# Patient Record
Sex: MALE | Race: WHITE | Employment: OTHER | URBAN - METROPOLITAN AREA
[De-identification: names, ages, dates, MRNs, and addresses within clinical notes are randomized per-mention and may not be internally consistent; named-entity substitution may affect disease eponyms.]

---

## 2020-07-30 ENCOUNTER — APPOINTMENT (OUTPATIENT)
Dept: GENERAL RADIOLOGY | Age: 50
DRG: 698 | End: 2020-07-30
Payer: MEDICARE

## 2020-07-30 ENCOUNTER — HOSPITAL ENCOUNTER (INPATIENT)
Age: 50
LOS: 4 days | Discharge: NURSING, REHAB OR LONG-TERM CARE | DRG: 698 | End: 2020-08-03
Attending: EMERGENCY MEDICINE | Admitting: INTERNAL MEDICINE
Payer: MEDICARE

## 2020-07-30 ENCOUNTER — APPOINTMENT (OUTPATIENT)
Dept: CT IMAGING | Age: 50
DRG: 698 | End: 2020-07-30
Payer: MEDICARE

## 2020-07-30 PROBLEM — A41.9 SEPSIS (HCC): Status: ACTIVE | Noted: 2020-07-30

## 2020-07-30 LAB
ALBUMIN SERPL-MCNC: 3.8 G/DL (ref 3.5–4.6)
ALP BLD-CCNC: 59 U/L (ref 35–104)
ALT SERPL-CCNC: 8 U/L (ref 0–41)
ANION GAP SERPL CALCULATED.3IONS-SCNC: 14 MEQ/L (ref 9–15)
AST SERPL-CCNC: 17 U/L (ref 0–40)
BACTERIA: ABNORMAL /HPF
BASOPHILS ABSOLUTE: 0 K/UL (ref 0–0.2)
BASOPHILS RELATIVE PERCENT: 0.4 %
BILIRUB SERPL-MCNC: 0.5 MG/DL (ref 0.2–0.7)
BILIRUBIN URINE: NEGATIVE
BLOOD, URINE: ABNORMAL
BUN BLDV-MCNC: 12 MG/DL (ref 6–20)
CALCIUM SERPL-MCNC: 9.3 MG/DL (ref 8.5–9.9)
CHLORIDE BLD-SCNC: 102 MEQ/L (ref 95–107)
CLARITY: ABNORMAL
CO2: 26 MEQ/L (ref 20–31)
COARSE CASTS, UA: ABNORMAL /LPF (ref 0–5)
COLOR: ABNORMAL
CREAT SERPL-MCNC: 0.87 MG/DL (ref 0.7–1.2)
CRYSTALS, UA: ABNORMAL /HPF
EOSINOPHILS ABSOLUTE: 0 K/UL (ref 0–0.7)
EOSINOPHILS RELATIVE PERCENT: 0.1 %
EPITHELIAL CELLS, UA: ABNORMAL /HPF (ref 0–5)
GFR AFRICAN AMERICAN: >60
GFR NON-AFRICAN AMERICAN: >60
GLOBULIN: 3.4 G/DL (ref 2.3–3.5)
GLUCOSE BLD-MCNC: 138 MG/DL (ref 70–99)
GLUCOSE URINE: NEGATIVE MG/DL
HCT VFR BLD CALC: 37.7 % (ref 42–52)
HEMOGLOBIN: 12.2 G/DL (ref 14–18)
KETONES, URINE: ABNORMAL MG/DL
LACTIC ACID, SEPSIS: 1.3 MMOL/L (ref 0.5–1.9)
LACTIC ACID: 1.6 MMOL/L (ref 0.5–2.2)
LEUKOCYTE ESTERASE, URINE: ABNORMAL
LIPASE: 25 U/L (ref 12–95)
LYMPHOCYTES ABSOLUTE: 0.6 K/UL (ref 1–4.8)
LYMPHOCYTES RELATIVE PERCENT: 5.2 %
MCH RBC QN AUTO: 27.9 PG (ref 27–31.3)
MCHC RBC AUTO-ENTMCNC: 32.4 % (ref 33–37)
MCV RBC AUTO: 86 FL (ref 80–100)
MONOCYTES ABSOLUTE: 0.8 K/UL (ref 0.2–0.8)
MONOCYTES RELATIVE PERCENT: 6.7 %
NEUTROPHILS ABSOLUTE: 10.7 K/UL (ref 1.4–6.5)
NEUTROPHILS RELATIVE PERCENT: 87.6 %
NITRITE, URINE: POSITIVE
PDW BLD-RTO: 18.2 % (ref 11.5–14.5)
PH UA: 6 (ref 5–9)
PLATELET # BLD: 249 K/UL (ref 130–400)
POC CREATININE WHOLE BLOOD: 0.9
POTASSIUM SERPL-SCNC: 3 MEQ/L (ref 3.4–4.9)
PROCALCITONIN: 1.68 NG/ML (ref 0–0.15)
PROTEIN UA: >=300 MG/DL
RBC # BLD: 4.38 M/UL (ref 4.7–6.1)
RBC UA: >100 /HPF (ref 0–5)
SARS-COV-2, NAAT: NOT DETECTED
SODIUM BLD-SCNC: 142 MEQ/L (ref 135–144)
SPECIFIC GRAVITY UA: 1.05 (ref 1–1.03)
TOTAL PROTEIN: 7.2 G/DL (ref 6.3–8)
URINE REFLEX TO CULTURE: YES
UROBILINOGEN, URINE: 1 E.U./DL
WBC # BLD: 12.2 K/UL (ref 4.8–10.8)
WBC UA: >100 /HPF (ref 0–5)

## 2020-07-30 PROCEDURE — 84145 PROCALCITONIN (PCT): CPT

## 2020-07-30 PROCEDURE — 83690 ASSAY OF LIPASE: CPT

## 2020-07-30 PROCEDURE — 87186 SC STD MICRODIL/AGAR DIL: CPT

## 2020-07-30 PROCEDURE — U0002 COVID-19 LAB TEST NON-CDC: HCPCS

## 2020-07-30 PROCEDURE — 87149 DNA/RNA DIRECT PROBE: CPT

## 2020-07-30 PROCEDURE — 87077 CULTURE AEROBIC IDENTIFY: CPT

## 2020-07-30 PROCEDURE — 6360000004 HC RX CONTRAST MEDICATION: Performed by: PHYSICIAN ASSISTANT

## 2020-07-30 PROCEDURE — 6360000002 HC RX W HCPCS: Performed by: PHYSICIAN ASSISTANT

## 2020-07-30 PROCEDURE — 96367 TX/PROPH/DG ADDL SEQ IV INF: CPT

## 2020-07-30 PROCEDURE — 87040 BLOOD CULTURE FOR BACTERIA: CPT

## 2020-07-30 PROCEDURE — 2580000003 HC RX 258: Performed by: PHYSICIAN ASSISTANT

## 2020-07-30 PROCEDURE — 81003 URINALYSIS AUTO W/O SCOPE: CPT

## 2020-07-30 PROCEDURE — 87086 URINE CULTURE/COLONY COUNT: CPT

## 2020-07-30 PROCEDURE — 71045 X-RAY EXAM CHEST 1 VIEW: CPT

## 2020-07-30 PROCEDURE — 96365 THER/PROPH/DIAG IV INF INIT: CPT

## 2020-07-30 PROCEDURE — 96366 THER/PROPH/DIAG IV INF ADDON: CPT

## 2020-07-30 PROCEDURE — 2060000000 HC ICU INTERMEDIATE R&B

## 2020-07-30 PROCEDURE — 36415 COLL VENOUS BLD VENIPUNCTURE: CPT

## 2020-07-30 PROCEDURE — 74177 CT ABD & PELVIS W/CONTRAST: CPT

## 2020-07-30 PROCEDURE — 6370000000 HC RX 637 (ALT 250 FOR IP): Performed by: PHYSICIAN ASSISTANT

## 2020-07-30 PROCEDURE — 83605 ASSAY OF LACTIC ACID: CPT

## 2020-07-30 PROCEDURE — 80053 COMPREHEN METABOLIC PANEL: CPT

## 2020-07-30 PROCEDURE — 99285 EMERGENCY DEPT VISIT HI MDM: CPT

## 2020-07-30 PROCEDURE — 85025 COMPLETE CBC W/AUTO DIFF WBC: CPT

## 2020-07-30 RX ORDER — POTASSIUM CHLORIDE 750 MG/1
40 TABLET, FILM COATED, EXTENDED RELEASE ORAL DAILY
Status: DISCONTINUED | OUTPATIENT
Start: 2020-07-31 | End: 2020-07-31

## 2020-07-30 RX ORDER — BACLOFEN 10 MG/1
10 TABLET ORAL EVERY MORNING
Status: ON HOLD | COMMUNITY

## 2020-07-30 RX ORDER — IBUPROFEN 800 MG/1
800 TABLET ORAL ONCE
Status: COMPLETED | OUTPATIENT
Start: 2020-07-30 | End: 2020-07-30

## 2020-07-30 RX ORDER — ACETAMINOPHEN 650 MG/1
650 SUPPOSITORY RECTAL EVERY 6 HOURS PRN
Status: DISCONTINUED | OUTPATIENT
Start: 2020-07-30 | End: 2020-08-04 | Stop reason: HOSPADM

## 2020-07-30 RX ORDER — ACETAMINOPHEN 500 MG
1000 TABLET ORAL ONCE
Status: COMPLETED | OUTPATIENT
Start: 2020-07-30 | End: 2020-07-30

## 2020-07-30 RX ORDER — SODIUM CHLORIDE 0.9 % (FLUSH) 0.9 %
10 SYRINGE (ML) INJECTION EVERY 12 HOURS SCHEDULED
Status: DISCONTINUED | OUTPATIENT
Start: 2020-07-30 | End: 2020-08-03 | Stop reason: SDUPTHER

## 2020-07-30 RX ORDER — LEVOTHYROXINE SODIUM 0.05 MG/1
50 TABLET ORAL DAILY
Status: ON HOLD | COMMUNITY

## 2020-07-30 RX ORDER — GABAPENTIN 100 MG/1
100 CAPSULE ORAL 3 TIMES DAILY
COMMUNITY
End: 2020-11-03 | Stop reason: SDUPTHER

## 2020-07-30 RX ORDER — PROPRANOLOL HCL 60 MG
60 CAPSULE, EXTENDED RELEASE 24HR ORAL DAILY
Status: DISCONTINUED | OUTPATIENT
Start: 2020-07-31 | End: 2020-08-04 | Stop reason: HOSPADM

## 2020-07-30 RX ORDER — GABAPENTIN 100 MG/1
100 CAPSULE ORAL 3 TIMES DAILY
Status: DISCONTINUED | OUTPATIENT
Start: 2020-07-30 | End: 2020-08-04 | Stop reason: HOSPADM

## 2020-07-30 RX ORDER — ACETAMINOPHEN 325 MG/1
650 TABLET ORAL EVERY 6 HOURS PRN
Status: DISCONTINUED | OUTPATIENT
Start: 2020-07-30 | End: 2020-08-04 | Stop reason: HOSPADM

## 2020-07-30 RX ORDER — DIVALPROEX SODIUM 500 MG/1
500 TABLET, EXTENDED RELEASE ORAL NIGHTLY
Status: DISCONTINUED | OUTPATIENT
Start: 2020-07-30 | End: 2020-08-02

## 2020-07-30 RX ORDER — BACLOFEN 10 MG/1
10 TABLET ORAL EVERY MORNING
Status: DISCONTINUED | OUTPATIENT
Start: 2020-07-31 | End: 2020-08-04 | Stop reason: HOSPADM

## 2020-07-30 RX ORDER — LAMOTRIGINE 200 MG/1
200 TABLET ORAL 2 TIMES DAILY
Status: DISCONTINUED | OUTPATIENT
Start: 2020-07-30 | End: 2020-08-04 | Stop reason: HOSPADM

## 2020-07-30 RX ORDER — LAMOTRIGINE 200 MG/1
200 TABLET ORAL 2 TIMES DAILY
Status: ON HOLD | COMMUNITY

## 2020-07-30 RX ORDER — POTASSIUM CHLORIDE 1500 MG/1
60 TABLET, FILM COATED, EXTENDED RELEASE ORAL 2 TIMES DAILY
COMMUNITY
End: 2020-10-28

## 2020-07-30 RX ORDER — 0.9 % SODIUM CHLORIDE 0.9 %
30 INTRAVENOUS SOLUTION INTRAVENOUS ONCE
Status: COMPLETED | OUTPATIENT
Start: 2020-07-30 | End: 2020-07-30

## 2020-07-30 RX ORDER — POLYETHYLENE GLYCOL 3350 17 G/17G
17 POWDER, FOR SOLUTION ORAL DAILY
Status: DISCONTINUED | OUTPATIENT
Start: 2020-07-30 | End: 2020-08-04 | Stop reason: HOSPADM

## 2020-07-30 RX ORDER — LEVOTHYROXINE SODIUM 0.05 MG/1
50 TABLET ORAL DAILY
Status: DISCONTINUED | OUTPATIENT
Start: 2020-07-31 | End: 2020-08-04 | Stop reason: HOSPADM

## 2020-07-30 RX ORDER — DIVALPROEX SODIUM 250 MG
750 TABLET, EXTENDED RELEASE 24 HR ORAL 2 TIMES DAILY
Status: ON HOLD | COMMUNITY

## 2020-07-30 RX ORDER — SODIUM CHLORIDE 0.9 % (FLUSH) 0.9 %
10 SYRINGE (ML) INJECTION PRN
Status: DISCONTINUED | OUTPATIENT
Start: 2020-07-30 | End: 2020-08-03 | Stop reason: SDUPTHER

## 2020-07-30 RX ORDER — PROPRANOLOL HCL 60 MG
60 CAPSULE, EXTENDED RELEASE 24HR ORAL DAILY
Status: ON HOLD | COMMUNITY
End: 2020-12-03 | Stop reason: HOSPADM

## 2020-07-30 RX ORDER — DIVALPROEX SODIUM 500 MG/1
500 TABLET, EXTENDED RELEASE ORAL NIGHTLY
Status: ON HOLD | COMMUNITY

## 2020-07-30 RX ORDER — POLYETHYLENE GLYCOL 3350 17 G/17G
17 POWDER, FOR SOLUTION ORAL DAILY PRN
COMMUNITY
End: 2020-11-26 | Stop reason: SDUPTHER

## 2020-07-30 RX ORDER — SODIUM PHOSPHATE, DIBASIC AND SODIUM PHOSPHATE, MONOBASIC 7; 19 G/133ML; G/133ML
1 ENEMA RECTAL DAILY PRN
COMMUNITY
End: 2020-11-26 | Stop reason: SDUPTHER

## 2020-07-30 RX ORDER — BISACODYL 10 MG
10 SUPPOSITORY, RECTAL RECTAL DAILY
Status: DISCONTINUED | OUTPATIENT
Start: 2020-07-30 | End: 2020-08-04 | Stop reason: HOSPADM

## 2020-07-30 RX ADMIN — POTASSIUM CHLORIDE AND SODIUM CHLORIDE: 900; 150 INJECTION, SOLUTION INTRAVENOUS at 16:00

## 2020-07-30 RX ADMIN — PIPERACILLIN AND TAZOBACTAM 3.38 G: 3; .375 INJECTION, POWDER, LYOPHILIZED, FOR SOLUTION INTRAVENOUS at 18:18

## 2020-07-30 RX ADMIN — ACETAMINOPHEN 1000 MG: 500 TABLET ORAL at 17:36

## 2020-07-30 RX ADMIN — IBUPROFEN 800 MG: 800 TABLET, FILM COATED ORAL at 17:37

## 2020-07-30 RX ADMIN — POTASSIUM BICARBONATE 40 MEQ: 782 TABLET, EFFERVESCENT ORAL at 15:54

## 2020-07-30 RX ADMIN — IOPAMIDOL 100 ML: 612 INJECTION, SOLUTION INTRAVENOUS at 16:26

## 2020-07-30 RX ADMIN — SODIUM CHLORIDE 3267 ML: 9 INJECTION, SOLUTION INTRAVENOUS at 17:40

## 2020-07-30 SDOH — HEALTH STABILITY: MENTAL HEALTH: HOW OFTEN DO YOU HAVE A DRINK CONTAINING ALCOHOL?: NEVER

## 2020-07-30 ASSESSMENT — PAIN SCALES - GENERAL: PAINLEVEL_OUTOF10: 0

## 2020-07-30 ASSESSMENT — ENCOUNTER SYMPTOMS
RECTAL PAIN: 0
NAUSEA: 0
VOMITING: 0
RHINORRHEA: 0
CONSTIPATION: 0
EYE DISCHARGE: 0
ABDOMINAL PAIN: 0
DIARRHEA: 0
SORE THROAT: 0
ABDOMINAL DISTENTION: 1
SHORTNESS OF BREATH: 0
COLOR CHANGE: 0

## 2020-07-30 NOTE — ED NOTES
Labs drawn. IV hung. Tolerated well. Pt cooperative. Took Motrin and Tylenol without difficulty.       Fe Duarte RN  07/30/20 2440

## 2020-07-30 NOTE — CARE COORDINATION
[facility] EMERGENCY [facility] AT [place] Case Management Initial Discharge Assessment    Met with Family to discuss discharge plan. PCP: Naima Solano MD                                Date of Last Visit: \"weeks\"    If no PCP, list provided? N/A    Discharge Planning    Living Arrangements: has his own apartment but has 24 hr caregivers from [address]      If lives at home:     Do you have any barriers navigating in your home? no    Patient can perform ADL? No    Dialysis: No    Is transportation available to get to your appointments? Yes    DME Equipment:  yes - walker, wheel chair    Respiratory equipment: None    Respiratory provider:  no     Pharmacy:  yes - [facility]    Consult with Medication Assistance Program?  No                  Does Patient Have a High-Risk for Readmission Diagnosis (CHF, PN, MI, COPD)? No        Initial Discharge Plan? (Note: please see concurrent daily documentation for any updates after initial note). CM to assess for any further d/c needs and referrals.       Electronically signed by Héctor Pastrana on 7/30/2020 at 7:57 PM

## 2020-07-30 NOTE — ED NOTES
Urine drawn from proximal port of washburn. Bag emptied then.       Rabon Ahumada, RN  07/30/20 5240

## 2020-07-30 NOTE — ED NOTES
DANIELA Ruby in to talk with patient and mom. Phleb unable to get 2nd blood culture. Pt refused.       Rodney Pack, RN  07/30/20 4667

## 2020-07-30 NOTE — ED NOTES
Pt refusing 2nd blood culture still. Per PA, ok to draw from IV lock for 2nd culture. Wasted 10ml first then drawn with prep done. Covid obtained with some difficulty but tolerated with a lot of reassurance. Mom at bedside.       Lesly Lucero RN  07/30/20 7870

## 2020-07-30 NOTE — ED NOTES
Bed assigned. [facility] called for 15 minute and for tele pack. Pt/mom also made aware.       Esther Sevilla RN  07/30/20 1550

## 2020-07-30 NOTE — ED NOTES
Pts HR keeps climbing. Checked pts temp. PA made aware of temp. Mom remains at bedside.      Jose Pozo RN  07/30/20 4517

## 2020-07-30 NOTE — ED NOTES
Bed: 10  Expected date: 7/30/20  Expected time: 2:36 PM  Means of arrival: [facility]  Comments:  49m from [facility]. MRDD. Combative with staff.       Chay Purcell RN  07/30/20 5948

## 2020-07-30 NOTE — ACP (ADVANCE CARE PLANNING)
Would the patient desire the use of ventilator (breathing machine)?: yes    \"If your health worsens and it becomes clear that your chance of recovery is unlikely, what would your preference be about the use of a ventilator (breathing machine) if it were available to you? \"     Would the patient desire the use of ventilator (breathing machine)?: Yes      Resuscitation  \"CPR works best to restart the heart when there is a sudden event, like a heart attack, in someone who is otherwise healthy. Unfortunately, CPR does not typically restart the heart for people who have serious health conditions or who are very sick. \"    \"In the event your heart stopped as a result of an underlying serious health condition, would you want attempts to be made to restart your heart (answer \"yes\" for attempt to resuscitate) or would you prefer a natural death (answer \"no\" for do not attempt to resuscitate)? \" yes      NOTE: If the patient has a valid advance directive AND now provides care preference(s) that are inconsistent with that prior directive, advise the patient to consider either: creating a new advance directive that complies with state-specific requirements; or, if that is not possible, orally revoking that prior directive in accordance with state-specific requirements, which must be documented in the EHR. [x] Yes   [] No   Educated Patient / Darek Valentin regarding differences between Advance Directives and portable DNR orders.     Length of ACP Conversation in minutes:  10  Conversation Outcomes:  [x] ACP discussion completed  [] Existing advance directive reviewed with patient; no changes to patient's previously recorded wishes  [] New Advance Directive completed  [] Portable Do Not Rescitate prepared for Provider review and signature  [] POLST/POST/MOLST/MOST prepared for Provider review and signature      Follow-up plan:    [] Schedule follow-up conversation to continue planning  [x] Referred individual to Provider for additional questions/concerns   [] Advised patient/agent/surrogate to review completed ACP document and update if needed with changes in condition, patient preferences or care setting    [x] This note routed to one or more involved healthcare providers

## 2020-07-30 NOTE — H&P
Department of Internal Medicine  History and Physical      CHIEF COMPLAINT: Abnormal Lab (Hgb 9.9 and K+ 2.9 today at [facility])      Reason for Admission:  Sepsis ([facility] [A41.9]    History Obtained From: Patient and chart review    HISTORY OF PRESENT ILLNESS:    The patient is a 44 y.o. male with significant past medical history of MRDD, Katheleen Stuart syndrome, epilepsy, neurogenic bladder(requiring straight cath), hypothyroidism, hypertension history of DVT in left leg in 2013 who underwent lithotripsy today morning for right renal stone. Patient was sent from his nursing facility because of potassium of 3 and in the emergency room he was noted to have high-grade temperature of 103. UA and urine reflex are highly concerning for urinary tract infection and patient received Zosyn. CT scan of the abdomen was done which showed ileus along with gallbladder distention and possible gallstones. Physical exam is not done as patient is not willing to let me touch him and screaming instead. Past Medical History:        Diagnosis Date    CKD (chronic kidney disease)     Diabetes mellitus ([facility]     Has a tremor     Hyperlipidemia     Hypokalemia     Intellectual disability     Kidney stone     Lennox-Gastaut syndrome (HCC)     Paralytic ileus ([facility]     Thyroid disease     Venous thrombosis and embolism        Past Surgical History:        Procedure Laterality Date    LITHOTRIPSY  07/30/2020       Medications Prior to Admission:    Not in a hospital admission. Allergies:  Cefazolin; Celontin [methsuximide]; Duricef [cefadroxil]; Simvastatin; and Vicodin [hydrocodone-acetaminophen]    Social History:   Social History     Tobacco History     Smoking Status  Never Smoker    Smokeless Tobacco Use  Never Used          Alcohol History     Alcohol Use Status  Never          Drug Use     Drug Use Status  Never          Sexual Activity     Sexually Active  Not Asked                Family History:   History reviewed. No pertinent family history. REVIEW OF SYSTEMS:  12 systems reviewed and are negative except as mentioned in HPI and A&P    PHYSICAL EXAM:  Vitals:  Vitals:    07/30/20 1900   BP: (!) 113/55   Pulse: 109   Resp: 22   Temp:    SpO2: 93%       Focal exam:      General Exam (except as mentioned above):  CONSTITUTIONAL: Awake, alert, no apparent distress  Remaining physical exam not performed. Patient however appears to be moving all his extremities, having clear speech. DATA:  LABS  Recent Labs     07/28/20  1105 07/30/20  1545   WBC 5.8 12.2*   RBC 3.49* 4.38*   HGB 10.0* 12.2*   HCT 30.4* 37.7*   MCV 87.1 86.0   MCH 28.7 27.9   MCHC 32.9* 32.4*   RDW 17.8* 18.2*    249       Recent Labs     07/28/20  1105 07/30/20  1545   * 142   K 3.5 3.0*   * 102   CO2 25 26   BUN 11 12   CREATININE 0.70 0.87   GLUCOSE 136* 138*   CALCIUM 9.0 9.3       No results for input(s): MG in the last 72 hours. EKG:  I have reviewed the EKG     Radiology Review:  I have reviewed the imaging studies -     ASSESSMENT AND PLAN:    JHONY/Caio Bowling 1106 Problems    Diagnosis Date Noted    [facility] ([facility] [A41.9] 07/30/2020     Sepsis: As manifested with high-grade fever of 103, tachycardia and leukocytosis on presentation along with elevated procalcitonin of 1.68 mostly secondary to urinary tract infection due to repeated catheterization and recent urological procedure. Continue with Zosyn. Follow-up urine cultures and blood cultures. Urinary tract infection: On reviewing past urine cultures patient has had polymicrobial infections with MRSA, enterococcus and staph epidermidis will add vancomycin in addition to Zosyn. Hypokalemia: Potassium replaced    MRDD and epilepsy: On Lamictal and valproate prior to admission. We will continue the same. Will get HIDA to rule out acute cholecystitis.       Delmar Jernigan MD  Pager : [phone]

## 2020-07-30 NOTE — ED NOTES
Pt tolerated IV/lab draw well. Mom to bedside right after. History went over with her.       Raven Green RN  07/30/20 8139

## 2020-07-30 NOTE — ED NOTES
Denia Alberto for more info on patient. He was sent for low potassium and pt refused to take his potassium pill. Also sent for ileus that pt is refusing treatment on. States his mom is on her way here. DANIELA Maldonado made aware.      Ondina Cartagena RN  07/30/20 4939

## 2020-07-30 NOTE — ED TRIAGE NOTES
Alert male. Skin pink warm and dry. Pt has upper body tremors noted. Pt verbal at times but mostly nonverbal. Pt denies pain. No distress noted. Lungs clear bilat. Abdomen slightly distended but appears to be nontender.

## 2020-07-30 NOTE — ED NOTES
Tele monitor was applied to patient. Monitor room was notified and verified proper placement.       Alex Barrow  07/30/20 2484

## 2020-07-30 NOTE — ED NOTES
Pt back onto monitor. Mom remains at bedside. Pt declines wanting to watch TV. Call light within reach. Water given to pts mom. No distress noted. IV infusing without signs of infiltration.       Alessandra Ramos RN  07/30/20 7971

## 2020-07-30 NOTE — ED NOTES
Verbal order from DANIELA Lainez to stop the potassium IV drip while ABX infusing. NS still infusing well.      Raven Green RN  07/30/20 5316

## 2020-07-30 NOTE — ED PROVIDER NOTES
[facility] ED  eMERGENCY dEPARTMENT eNCOUnter      Pt Name: Simon Agarwal  MRN: [id number]  [place] 1970  Date of evaluation: 7/30/2020  Provider: Jacek Cali PA-C    CHIEF COMPLAINT       Chief Complaint   Patient presents with    Abnormal Lab     Hgb 9.9 and K+ 2.9 today at [facility]   (Location/Symptom, Timing/Onset,Context/Setting, Quality, Duration, Modifying Factors, Severity)  Note limiting factors. Simon Agarwal is a 44 y.o. male who presents to the emergency department with concerns for low potassium level, and ileus. At the nursing home states that patient has ileus, he states he refused to take his oral potassium today, and he was sent to the hospital for further evaluation. He has increasing abdominal distention, no nausea or vomiting. No urinary complaints. Nursing states since patient refuses care, he was sent to the hospital for evaluation. He appears in no acute distress on arrival.  Denies any pain. Potassium level as obtained from lab work from nursing home is 2.9 today, on the 28th of this month he was 3.5. Hemoglobin today is 9.9, previous on 7/28 is 10.0. HPI    NursingNotes were reviewed. REVIEW OF SYSTEMS    (2-9 systems for level 4, 10 or more for level 5)     Review of Systems   Constitutional: Negative for activity change and appetite change. HENT: Negative for congestion, ear discharge, ear pain, nosebleeds, rhinorrhea and sore throat. Eyes: Negative for discharge. Respiratory: Negative for shortness of breath. Cardiovascular: Negative for chest pain, palpitations and leg swelling. Gastrointestinal: Positive for abdominal distention. Negative for abdominal pain, constipation, diarrhea, nausea, rectal pain and vomiting. Genitourinary: Negative for difficulty urinating and dysuria. Musculoskeletal: Negative for arthralgias. Skin: Negative for color change, pallor, rash and wound.    Neurological: Negative for dizziness, tremors, syncope, weakness, numbness and headaches. Psychiatric/Behavioral: Negative for agitation and confusion. Except as noted above the remainder of the review of systems was reviewed and negative. PAST MEDICAL HISTORY     Past Medical History:   Diagnosis Date    CKD (chronic kidney disease)     Diabetes mellitus ([facility]     Has a tremor     Hyperlipidemia     Hypokalemia     Intellectual disability     Kidney stone     Lennox-Gastaut syndrome (HCC)     Paralytic ileus ([facility]     Thyroid disease     Venous thrombosis and embolism          SURGICALHISTORY       Past Surgical History:   Procedure Laterality Date    LITHOTRIPSY  07/30/2020         CURRENT MEDICATIONS       Previous Medications    BACLOFEN (LIORESAL) 10 MG TABLET    Take 10 mg by mouth every morning    BISACODYL (DULCOLAX RE)    Place 1 Units rectally daily as needed (constipation)    DIVALPROEX (DEPAKOTE ER) 250 MG EXTENDED RELEASE TABLET    Take 750 mg by mouth 2 times daily    DIVALPROEX (DEPAKOTE ER) 500 MG EXTENDED RELEASE TABLET    Take 500 mg by mouth nightly    GABAPENTIN (NEURONTIN) 100 MG CAPSULE    Take 100 mg by mouth 3 times daily. LAMOTRIGINE (LAMICTAL) 200 MG TABLET    Take 200 mg by mouth 2 times daily    LEVOTHYROXINE (SYNTHROID) 50 MCG TABLET    Take 50 mcg by mouth Daily    MAGNESIUM HYDROXIDE (MILK OF MAGNESIA) 400 MG/5ML SUSPENSION    Take 30 mLs by mouth daily as needed for Constipation    POLYETHYLENE GLYCOL (GLYCOLAX) 17 G PACKET    Take 17 g by mouth daily as needed for Constipation    POTASSIUM CHLORIDE (KLOR-CON M) 20 MEQ TBCR EXTENDED RELEASE TABLET    Take 60 mEq by mouth 2 times daily    PROPRANOLOL (INDERAL LA) 60 MG EXTENDED RELEASE CAPSULE    Take 60 mg by mouth daily    SODIUM PHOSPHATES (FLEET) 7-19 GM/118ML    Place 1 enema rectally daily as needed (constipation)       ALLERGIES     Cefazolin; Celontin [methsuximide]; Duricef [cefadroxil];  Simvastatin; and Vicodin congestion. Mouth/Throat:      Mouth: Mucous membranes are moist.      Pharynx: No oropharyngeal exudate or posterior oropharyngeal erythema. Eyes:      Extraocular Movements: Extraocular movements intact. Conjunctiva/sclera: Conjunctivae normal.      Pupils: Pupils are equal, round, and reactive to light. Neck:      Musculoskeletal: Normal range of motion and neck supple. No neck rigidity. Vascular: No JVD. Trachea: No tracheal deviation. Cardiovascular:      Rate and Rhythm: Normal rate. Pulses: Normal pulses. Heart sounds: Normal heart sounds. No murmur. No friction rub. No gallop. Pulmonary:      Effort: Pulmonary effort is normal. No tachypnea, accessory muscle usage, respiratory distress or retractions. Breath sounds: No stridor. No wheezing, rhonchi or rales. Chest:      Chest wall: No tenderness. Abdominal:      General: Abdomen is flat. Bowel sounds are normal. There is no distension or abdominal bruit. Palpations: Abdomen is soft. There is no shifting dullness, fluid wave, hepatomegaly, splenomegaly, mass or pulsatile mass. Tenderness: There is no abdominal tenderness. There is no right CVA tenderness, left CVA tenderness, guarding or rebound. Negative signs include Wilcox's sign, Rovsing's sign and McBurney's sign. Comments: Abdomen distended, soft, no guarding mass or rebound. No CVA tenderness. Musculoskeletal: Normal range of motion. General: No deformity. Skin:     General: Skin is warm and dry. Capillary Refill: Capillary refill takes less than 2 seconds. Coloration: Skin is not jaundiced. Neurological:      General: No focal deficit present. Mental Status: He is alert. Mental status is at baseline. Cranial Nerves: No cranial nerve deficit. Sensory: No sensory deficit. Motor: No weakness.       Coordination: Coordination normal.      Comments: Patient MRDD difficult to obtain information from patient. Psychiatric:         Mood and Affect: Mood normal.         DIAGNOSTIC RESULTS     EKG: All EKG's are interpreted by the Emergency Department Physician who either signs or Co-signsthis chart in the absence of a cardiologist.        RADIOLOGY:   Raheem Files such as CT, Ultrasound and MRI are read by the radiologist. Plain radiographic images are visualized and preliminarily interpreted by the emergency physician with the below findings:    Right lower lobe infiltrate versus atelectasis. Interpretation per the Radiologist below, if available at the time ofthis note:    CT ABDOMEN PELVIS W IV CONTRAST Additional Contrast? None   Final Result   1. THE SIGMOID IS MARKEDLY REDUNDANT AND THE MID TO DISTAL DILATED AS DESCRIBED ABOVE. THERE IS A TRANSITION POINT, NARROWING WITH  MURAL THICKENING BEGINNING AT THE DISTAL SIGMOID AND EXTENDING TO THE ANUS WITH SURROUNDING PERISIGMOID AND PERIRECTAL    PERIANAL INFLAMMATORY STRANDING. FINDINGS ARE MORE SUGGESTIVE OF A MECHANICAL ILEUS DUE TO THE NARROWING. FINDINGS COULD REPRESENT A DIFFUSE UNDERLYING INFLAMMATORY INFECTIOUS ETIOLOGY. FURTHER EVALUATION IS WARRANTED. Selwyn Levine 2. THE GALLBLADDER IS DISTENDED AND THERE IS INCREASED ATTENUATION LAYERING DEPENDENTLY SUGGESTING GALLBLADDER SLUDGE OR STONES. CORRELATE CLINICALLY. CONSIDER FOLLOW-UP ULTRASOUND TO FURTHER EVALUATE   2. RIGHT-SIDED DOUBLE-J STENT WITH ASSOCIATED RIGHT-SIDED CHOLELITHIASIS AS DESCRIBED ABOVE. SMALL AMOUNT OF AIR WITHIN THE COLLECTING SYSTEM WHICH MAY BE RELATED TO INSTRUMENTATION. CORRELATE WITH URINALYSIS. 3. AREA OF FOCAL INFILTRATE CONSOLIDATION WITHIN THE RIGHT LOWER LOBE ALTHOUGH UNDERLYING SOFT TISSUE MASS IS NOT EXCLUDED WITH TRACE PLEURAL EFFUSION. WILL NEED FURTHER EVALUATION FOLLOW-UP TO RESOLUTION. 4. THERE IS SMALL AMOUNT OF FREE FLUID AT THE INFERIOR ASPECT OF THE RIGHT LOBE OF LIVER AND EXTENDING TO THE RIGHT PARACOLIC GUTTER.          All CT scans at this facility use patient's condition which required my urgent intervention. CONSULTS:  IP CONSULT TO HOSPITALIST    PROCEDURES:  Unless otherwise noted below, none     Procedures    FINAL IMPRESSION      1. Septicemia ([address]    2. Urinary tract infection without hematuria, site unspecified    3. Colitis    4. Pneumonia due to organism          DISPOSITION/PLAN   DISPOSITION        PATIENT REFERRED TO:  No follow-up provider specified.     DISCHARGE MEDICATIONS:  New Prescriptions    No medications on file          (Please note that portions of this note were completed with a voice recognition program.  Efforts were made to edit the dictations but occasionally words are mis-transcribed.)    Suzan Marrero PA-C (electronically signed)  Attending Emergency Physician         Suzan Marrero PA-C  07/30/20 [address]27 [place] [address]

## 2020-07-30 NOTE — ED NOTES
Mom going up with patient after ok'ed by [facility] [place] nurse and supervisor. Pt masked for transport. No acute distress noted.       Yanique Ornelas RN  07/30/20 9786

## 2020-07-31 LAB
ANION GAP SERPL CALCULATED.3IONS-SCNC: 13 MEQ/L (ref 9–15)
BASOPHILS ABSOLUTE: 0.1 K/UL (ref 0–0.2)
BASOPHILS RELATIVE PERCENT: 0.5 %
BUN BLDV-MCNC: 15 MG/DL (ref 6–20)
CALCIUM SERPL-MCNC: 8.2 MG/DL (ref 8.5–9.9)
CHLORIDE BLD-SCNC: 110 MEQ/L (ref 95–107)
CO2: 21 MEQ/L (ref 20–31)
CREAT SERPL-MCNC: 0.92 MG/DL (ref 0.7–1.2)
EOSINOPHILS ABSOLUTE: 0 K/UL (ref 0–0.7)
EOSINOPHILS RELATIVE PERCENT: 0 %
GFR AFRICAN AMERICAN: >60
GFR NON-AFRICAN AMERICAN: >60
GLUCOSE BLD-MCNC: 121 MG/DL (ref 70–99)
HCT VFR BLD CALC: 28.5 % (ref 42–52)
HEMOGLOBIN: 9.3 G/DL (ref 14–18)
LACTIC ACID, SEPSIS: 1.2 MMOL/L (ref 0.5–1.9)
LYMPHOCYTES ABSOLUTE: 0.6 K/UL (ref 1–4.8)
LYMPHOCYTES RELATIVE PERCENT: 5.9 %
MAGNESIUM: 1.3 MG/DL (ref 1.7–2.4)
MCH RBC QN AUTO: 28.2 PG (ref 27–31.3)
MCHC RBC AUTO-ENTMCNC: 32.7 % (ref 33–37)
MCV RBC AUTO: 86.2 FL (ref 80–100)
MONOCYTES ABSOLUTE: 1.2 K/UL (ref 0.2–0.8)
MONOCYTES RELATIVE PERCENT: 10.8 %
NEUTROPHILS ABSOLUTE: 8.9 K/UL (ref 1.4–6.5)
NEUTROPHILS RELATIVE PERCENT: 82.8 %
PDW BLD-RTO: 18.2 % (ref 11.5–14.5)
PLATELET # BLD: 192 K/UL (ref 130–400)
POTASSIUM REFLEX MAGNESIUM: 2.7 MEQ/L (ref 3.4–4.9)
POTASSIUM SERPL-SCNC: 3.5 MEQ/L (ref 3.4–4.9)
PROCALCITONIN: 11.25 NG/ML (ref 0–0.15)
RBC # BLD: 3.3 M/UL (ref 4.7–6.1)
SODIUM BLD-SCNC: 144 MEQ/L (ref 135–144)
WBC # BLD: 10.7 K/UL (ref 4.8–10.8)

## 2020-07-31 PROCEDURE — APPNB60 APP NON BILLABLE TIME 46-60 MINS: Performed by: NURSE PRACTITIONER

## 2020-07-31 PROCEDURE — 83735 ASSAY OF MAGNESIUM: CPT

## 2020-07-31 PROCEDURE — 2060000000 HC ICU INTERMEDIATE R&B

## 2020-07-31 PROCEDURE — 99222 1ST HOSP IP/OBS MODERATE 55: CPT | Performed by: INTERNAL MEDICINE

## 2020-07-31 PROCEDURE — 6360000002 HC RX W HCPCS: Performed by: INTERNAL MEDICINE

## 2020-07-31 PROCEDURE — 2580000003 HC RX 258: Performed by: INTERNAL MEDICINE

## 2020-07-31 PROCEDURE — 99221 1ST HOSP IP/OBS SF/LOW 40: CPT | Performed by: INTERNAL MEDICINE

## 2020-07-31 PROCEDURE — 6370000000 HC RX 637 (ALT 250 FOR IP): Performed by: INTERNAL MEDICINE

## 2020-07-31 PROCEDURE — 84132 ASSAY OF SERUM POTASSIUM: CPT

## 2020-07-31 PROCEDURE — 99222 1ST HOSP IP/OBS MODERATE 55: CPT | Performed by: SURGERY

## 2020-07-31 PROCEDURE — 36415 COLL VENOUS BLD VENIPUNCTURE: CPT

## 2020-07-31 PROCEDURE — U0003 INFECTIOUS AGENT DETECTION BY NUCLEIC ACID (DNA OR RNA); SEVERE ACUTE RESPIRATORY SYNDROME CORONAVIRUS 2 (SARS-COV-2) (CORONAVIRUS DISEASE [COVID-19]), AMPLIFIED PROBE TECHNIQUE, MAKING USE OF HIGH THROUGHPUT TECHNOLOGIES AS DESCRIBED BY CMS-2020-01-R: HCPCS

## 2020-07-31 PROCEDURE — 84145 PROCALCITONIN (PCT): CPT

## 2020-07-31 PROCEDURE — 80048 BASIC METABOLIC PNL TOTAL CA: CPT

## 2020-07-31 PROCEDURE — 85025 COMPLETE CBC W/AUTO DIFF WBC: CPT

## 2020-07-31 RX ORDER — POTASSIUM CHLORIDE 7.45 MG/ML
10 INJECTION INTRAVENOUS
Status: COMPLETED | OUTPATIENT
Start: 2020-07-31 | End: 2020-07-31

## 2020-07-31 RX ORDER — SODIUM CHLORIDE 9 MG/ML
INJECTION, SOLUTION INTRAVENOUS CONTINUOUS
Status: DISCONTINUED | OUTPATIENT
Start: 2020-07-31 | End: 2020-08-02

## 2020-07-31 RX ORDER — MAGNESIUM SULFATE IN WATER 40 MG/ML
4 INJECTION, SOLUTION INTRAVENOUS ONCE
Status: COMPLETED | OUTPATIENT
Start: 2020-07-31 | End: 2020-07-31

## 2020-07-31 RX ADMIN — LAMOTRIGINE 200 MG: 200 TABLET ORAL at 08:53

## 2020-07-31 RX ADMIN — LAMOTRIGINE 200 MG: 200 TABLET ORAL at 00:30

## 2020-07-31 RX ADMIN — POTASSIUM CHLORIDE 10 MEQ: 7.46 INJECTION, SOLUTION INTRAVENOUS at 11:18

## 2020-07-31 RX ADMIN — POTASSIUM CHLORIDE AND SODIUM CHLORIDE: 900; 150 INJECTION, SOLUTION INTRAVENOUS at 00:03

## 2020-07-31 RX ADMIN — PIPERACILLIN AND TAZOBACTAM 3.38 G: 3; .375 INJECTION, POWDER, LYOPHILIZED, FOR SOLUTION INTRAVENOUS at 02:05

## 2020-07-31 RX ADMIN — POTASSIUM CHLORIDE 10 MEQ: 7.46 INJECTION, SOLUTION INTRAVENOUS at 13:08

## 2020-07-31 RX ADMIN — DIVALPROEX SODIUM 750 MG: 500 TABLET, EXTENDED RELEASE ORAL at 13:07

## 2020-07-31 RX ADMIN — POTASSIUM CHLORIDE 10 MEQ: 7.46 INJECTION, SOLUTION INTRAVENOUS at 12:24

## 2020-07-31 RX ADMIN — GABAPENTIN 100 MG: 100 CAPSULE ORAL at 21:53

## 2020-07-31 RX ADMIN — PIPERACILLIN AND TAZOBACTAM 3.38 G: 3; .375 INJECTION, POWDER, LYOPHILIZED, FOR SOLUTION INTRAVENOUS at 08:54

## 2020-07-31 RX ADMIN — VANCOMYCIN HYDROCHLORIDE 1250 MG: 5 INJECTION, POWDER, LYOPHILIZED, FOR SOLUTION INTRAVENOUS at 00:04

## 2020-07-31 RX ADMIN — LAMOTRIGINE 200 MG: 200 TABLET ORAL at 21:54

## 2020-07-31 RX ADMIN — DIVALPROEX SODIUM 500 MG: 500 TABLET, FILM COATED, EXTENDED RELEASE ORAL at 00:30

## 2020-07-31 RX ADMIN — POTASSIUM CHLORIDE 40 MEQ: 750 TABLET, EXTENDED RELEASE ORAL at 08:53

## 2020-07-31 RX ADMIN — ENOXAPARIN SODIUM 40 MG: 40 INJECTION SUBCUTANEOUS at 08:54

## 2020-07-31 RX ADMIN — PROPRANOLOL HYDROCHLORIDE 60 MG: 60 CAPSULE, EXTENDED RELEASE ORAL at 08:53

## 2020-07-31 RX ADMIN — DIVALPROEX SODIUM 750 MG: 500 TABLET, EXTENDED RELEASE ORAL at 08:53

## 2020-07-31 RX ADMIN — MAGNESIUM SULFATE HEPTAHYDRATE 4 G: 40 INJECTION, SOLUTION INTRAVENOUS at 14:31

## 2020-07-31 RX ADMIN — GABAPENTIN 100 MG: 100 CAPSULE ORAL at 13:07

## 2020-07-31 RX ADMIN — GABAPENTIN 100 MG: 100 CAPSULE ORAL at 08:53

## 2020-07-31 RX ADMIN — MEROPENEM 1 G: 1 INJECTION, POWDER, FOR SOLUTION INTRAVENOUS at 18:16

## 2020-07-31 RX ADMIN — DIVALPROEX SODIUM 500 MG: 500 TABLET, FILM COATED, EXTENDED RELEASE ORAL at 21:53

## 2020-07-31 RX ADMIN — BACLOFEN 10 MG: 10 TABLET ORAL at 08:53

## 2020-07-31 RX ADMIN — Medication 10 ML: at 18:17

## 2020-07-31 ASSESSMENT — PAIN SCALES - GENERAL
PAINLEVEL_OUTOF10: 0

## 2020-07-31 ASSESSMENT — ENCOUNTER SYMPTOMS
VOMITING: 0
SHORTNESS OF BREATH: 0
NAUSEA: 0
COUGH: 0
DIARRHEA: 0

## 2020-07-31 NOTE — PROGRESS NOTES
Ty Brower is a 44 y.o. male patient.  Pt was seen and evaluated, no overnight events    Current Facility-Administered Medications   Medication Dose Route Frequency Provider Last Rate Last Dose    magnesium sulfate 4 g in 100 mL IVPB premix  4 g Intravenous Once Maria G Fajardo MD        0.9% NaCl with KCl 20 mEq 1,000 mL infusion   Intravenous Continuous Porsha Macias  mL/hr at 07/31/20 0003      vancomycin (VANCOCIN) 1,250 mg in dextrose 5 % 250 mL IVPB  1,250 mg Intravenous Q12H Porsha Macias MD   Stopped at 07/31/20 0204    baclofen (LIORESAL) tablet 10 mg  10 mg Oral QAM Porsha Macias MD   10 mg at 07/31/20 4431    bisacodyl (DULCOLAX) suppository 10 mg  10 mg Rectal Daily Porsha Macias MD        divalproex (DEPAKOTE ER) extended release tablet 750 mg  750 mg Oral BID Porsha Macias MD   750 mg at 07/31/20 6084    divalproex (DEPAKOTE ER) extended release tablet 500 mg  500 mg Oral Nightly Porsha Macias MD   500 mg at 07/31/20 0030    gabapentin (NEURONTIN) capsule 100 mg  100 mg Oral TID Porsha Macias MD   100 mg at 07/31/20 4263    lamoTRIgine (LAMICTAL) tablet 200 mg  200 mg Oral BID Porsha Macias MD   200 mg at 07/31/20 9523    levothyroxine (SYNTHROID) tablet 50 mcg  50 mcg Oral Daily Porsha Macias MD        magnesium hydroxide (MILK OF MAGNESIA) 400 MG/5ML suspension 30 mL  30 mL Oral Daily PRN Porsha Macias MD        polyethylene glycol (GLYCOLAX) packet 17 g  17 g Oral Daily Porsha Macias MD        potassium chloride (KLOR-CON) extended release tablet 40 mEq  40 mEq Oral Daily Porsha Mota MD   40 mEq at 07/31/20 0853    propranolol (INDERAL LA) extended release capsule 60 mg  60 mg Oral Daily Porsha Macias MD   60 mg at 07/31/20 0853    sodium chloride flush 0.9 % injection 10 mL  10 mL Intravenous 2 times per day Devika Paul MD        sodium chloride flush 0.9 % injection 10 mL  10 mL Intravenous PRN Porsha Mota MD        acetaminophen (TYLENOL) tablet 650 mg  650 mg Oral Q6H PRN Porsha Thompson MD        Or    acetaminophen (TYLENOL) suppository 650 mg  650 mg Rectal Q6H PRN Porsha Thompson MD        enoxaparin (LOVENOX) injection 40 mg  40 mg Subcutaneous Daily Porsha Mota MD   40 mg at 07/31/20 0854    piperacillin-tazobactam (ZOSYN) 3.375 g in dextrose 5 % 50 mL IVPB extended infusion (mini-bag)  3.375 g Intravenous Q8H Porsha Thompson MD 12.5 mL/hr at 07/31/20 0854 3.375 g at 07/31/20 7405     Allergies   Allergen Reactions    Cefazolin     Celontin [Methsuximide]     Duricef [Cefadroxil]     Simvastatin     Vicodin [Hydrocodone-Acetaminophen]      Active Problems:    Sepsis ([address]  Resolved Problems:    * No resolved hospital problems. *    Blood pressure (!) 118/46, pulse 101, temperature 98.2 °F (36.8 °C), temperature source Oral, resp. rate 20, height 6' (1.829 m), weight 262 lb 14.4 oz (119.3 kg), SpO2 93 %. Subjective:  Symptoms:  He reports malaise and weakness. No shortness of breath, cough, chest pain, headache, chest pressure, anorexia, diarrhea or anxiety. Diet:  No nausea or vomiting. Objective:  General Appearance:  Ill-appearing. Vital signs: (most recent): Blood pressure (!) 118/46, pulse 101, temperature 98.2 °F (36.8 °C), temperature source Oral, resp. rate 20, height 6' (1.829 m), weight 262 lb 14.4 oz (119.3 kg), SpO2 93 %. HEENT: Normal HEENT exam.    Lungs:  Normal effort. Heart: Normal rate. Regular rhythm. S1 normal and S2 normal.    Abdomen: Abdomen is soft. Bowel sounds are normal.   There is no mass. Pulses: Distal pulses are intact. Neurological: Patient is alert. Pupils:  Pupils are equal, round, and reactive to light. Skin:  Warm and dry.       Assessment & Plan  1) sepsis POA most liley urine vs gallblader  FU surery  C/w abx  FU ID  2) hypokalemia  Hypomagnesemia  replced  3) MRDD   Supportive measures  4) COVID rule out  repeat covid testing today  Corina Canseco MD  7/31/2020

## 2020-07-31 NOTE — DISCHARGE INSTR - COC
Continuity of Care Form    Patient Name: Mary Pollock   :  1970  MRN:  [id number]    Admit date:  2020  Discharge date:  8/3/20    Code Status Order: Full Code   Advance Directives:   [address] [facility] Documentation     Date/Time Healthcare Directive Type of Healthcare Directive Copy in [address] Agent's Name Healthcare Agent's Phone Number    20  No, patient does not have an advance directive for healthcare treatment -- -- -- -- --          Admitting Physician:  No admitting provider for patient encounter. PCP: Amy Farah MD    Discharging Nurse:   [address] Unit/Room#: G300/D382-09  Discharging Unit Phone Number: [phone]    Emergency Contact:   Extended Emergency Contact Information  Primary Emergency Contact: [facility]  Address: [address] [place] [address],[address], [address] Phone: [phone]  Relation: Parent    Past Surgical History:  Past Surgical History:   Procedure Laterality Date    LITHOTRIPSY  2020       Immunization History: There is no immunization history on file for this patient.     Active Problems:  Patient Active Problem List   Diagnosis Code    Sepsis ([facility] [address] A41.9       Isolation/Infection:   Isolation          Droplet Plus        Patient Infection Status     Infection Onset Added Last Indicated Last Indicated By Review Planned Expiration Resolved Resolved By    None active    Resolved    COVID-19 Rule Out 20 COVID-19 (Ordered)   20 Rule-Out Test Resulted          Nurse Assessment:  Last Vital Signs: BP (!) 118/46   Pulse 101   Temp 98.2 °F (36.8 °C) (Oral)   Resp 20   Ht 6' (1.829 m)   Wt 262 lb 14.4 oz (119.3 kg)   SpO2 93%   BMI 35.66 kg/m²     Last documented pain score (0-10 scale): Pain Level: 0  Last Weight:   Wt Readings from Last 1 Encounters:   20 262 lb 14.4 oz (119.3 kg)     Mental Status:  oriented to person    IV

## 2020-07-31 NOTE — CARE COORDINATION
Per Sara Bahena of [place], the patient is short term and a bed hold at [facility]. Sara Banks [place], is aware the patient's 1st COVID was negative on 7/30 - await 2nd COVID. Per Naz Low, ok for the patient to return to [facility] over the weekend (if 2nd covid test is negative).  Electronically signed by Zelalem Pineda on 7/31/20 at 2:44 PM EDT

## 2020-07-31 NOTE — CONSULTS
Pt Name: John Buchanan  MRN: [id number]  YOB: 1970  Date of evaluation: 7/31/2020  Primary Care Physician: Cyndie Awan MD  Patient evaluated at the request of  Dr. Tenzin Enamorado  Reason for evaluation: Possible cholecystitis    IMPRESSIONS:  1. Distal colonic narrowing, rule out obstruction and tumor  2. Dilated gallbladder of unclear significance  3. No evidence of peritonitis  4. MRDD  5. Sepsis unclear etiology    PLANS  1. GB ultrasound  2. Discontinue HIDA scan, I do not think he will cooperate long enough for it  3. GI consultation    SUBJECTIVE:  History of Chief Complaint:    Marcio Brizuela is a 44 y.o.male who presents with to the emergency room from a nursing home with low potassium and abdominal distention. Patient is unable to give me any history. History obtained through chart and nursing. He has had increasing abdominal distention but no nausea or vomiting and has been moving his bowels. He has had no pain. On admission his potassium was 2.9 and his hemoglobin is 9.9 . He had a CAT scan of the abdomen and pelvis done for his abdominal distention that revealed a moderately distended distal colon with a transition point in narrowing of the distal sigmoid extending into the anus. He has a distended gallbladder. He has a double-J stent in his right ureter. He has been very uncooperative with answering of questions and having testing done. Past Medical History   has a past medical history of CKD (chronic kidney disease), Diabetes mellitus ([address] Has a tremor, Hyperlipidemia, Hypokalemia, Intellectual disability, Kidney stone, Lennox-Gastaut syndrome ([address] Paralytic ileus ([address] Thyroid disease, and Venous thrombosis and embolism. Past Surgical History   has a past surgical history that includes Lithotripsy (07/30/2020). Medications  Prior to Admission medications    Medication Sig Start Date End Date Taking?  Authorizing Provider   baclofen (LIORESAL) 10 MG tablet Take 10 mg by mouth every morning Historical Provider, MD   divalproex (DEPAKOTE ER) 250 MG extended release tablet Take 750 mg by mouth 2 times daily    Historical Provider, MD   divalproex (DEPAKOTE ER) 500 MG extended release tablet Take 500 mg by mouth nightly    Historical Provider, MD   Bisacodyl (DULCOLAX RE) Place 1 Units rectally daily as needed (constipation)    Historical Provider, MD   Sodium Phosphates (FLEET) 7-19 GM/118ML Place 1 enema rectally daily as needed (constipation)    Historical Provider, MD   gabapentin (NEURONTIN) 100 MG capsule Take 100 mg by mouth 3 times daily.     Historical Provider, MD   lamoTRIgine (LAMICTAL) 200 MG tablet Take 200 mg by mouth 2 times daily    Historical Provider, MD   magnesium hydroxide (MILK OF MAGNESIA) 400 MG/5ML suspension Take 30 mLs by mouth daily as needed for Constipation    Historical Provider, MD   polyethylene glycol (GLYCOLAX) 17 g packet Take 17 g by mouth daily as needed for Constipation    Historical Provider, MD   potassium chloride (KLOR-CON M) 20 MEQ TBCR extended release tablet Take 60 mEq by mouth 2 times daily    Historical Provider, MD   propranolol (INDERAL LA) 60 MG extended release capsule Take 60 mg by mouth daily    Historical Provider, MD   levothyroxine (SYNTHROID) 50 MCG tablet Take 50 mcg by mouth Daily    Historical Provider, MD    Scheduled Meds:   potassium chloride  10 mEq Intravenous Q1H    vancomycin  1,250 mg Intravenous Q12H    baclofen  10 mg Oral QAM    bisacodyl  10 mg Rectal Daily    divalproex  750 mg Oral BID    divalproex  500 mg Oral Nightly    gabapentin  100 mg Oral TID    lamoTRIgine  200 mg Oral BID    levothyroxine  50 mcg Oral Daily    polyethylene glycol  17 g Oral Daily    potassium chloride  40 mEq Oral Daily    propranolol  60 mg Oral Daily    sodium chloride flush  10 mL Intravenous 2 times per day    enoxaparin  40 mg Subcutaneous Daily    piperacillin-tazobactam  3.375 g Intravenous Q8H     Continuous Infusions:   IV bowel sounds. No bruits. Soft, nondistended, no masses or organomegaly. no evidence of hernia. Tenderness: absent. RECTAL: Refused exam  NEUROLOGIC: There are no focalizing motor or sensory deficits. CN II-XII are grossly intact. Rishi Marcum EXTREMITIES: no cyanosis, no clubbing and no edema. PYSCH: Not responding appropriately to my questions, combative at times  LABS:  Recent Labs     07/28/20  1105 07/30/20  1545 07/30/20  1745 07/31/20  0615   WBC 5.8 12.2*  --  10.7   HGB 10.0* 12.2*  --  9.3*   HCT 30.4* 37.7*  --  28.5*    249  --  192   * 142  --  144   K 3.5 3.0*  --  2.7*   * 102  --  110*   CO2 25 26  --  21   BUN 11 12  --  15   CREATININE 0.70 0.87  --  0.92   MG  --   --   --  1.3*   CALCIUM 9.0 9.3  --  8.2*   AST 12 17  --   --    ALT <5 8  --   --    BILITOT <0.2 0.5  --   --    LIPASE  --   --  25  --    LACTA  --   --  1.6  --    NITRU  --   --  POSITIVE*  --    COLORU  --   --  ORANGE*  --    BACTERIA  --   --  MANY*  --        RADIOLOGY:  I have personally reviewed the following films:  CT scan abd/pelvis: I reviewed the x-ray with the radiologist and there is concern for possible pathology in the distal colon. Small amount of free fluid in the abdomen. Gallbladder is distended without evidence of acute inflammation. Thank you for the interesting evaluation. Further recommendations to follow.     Electronically signed by Brittney Mcgowan MD on 7/31/20 at 9:55 AM EDT

## 2020-07-31 NOTE — FLOWSHEET NOTE
2018 Pt's arrived to floor. VSS. Afebrile. Assessment completed. 2045 Admission completed with pt's LG. Awaiting lamictal and vanco to be delivered by pharmacy. 2214 Message sent to pharmacy requesting lamictal, vanco, and new bag of potassium chloride. Pharmacy unable to reach tube system due to housekeeping. [address] and they are still unable to reach tube system. Awaiting delivery of medications. Electronically signed by Edgard Lozoya RN on 7/30/2020 at 11:17 PM    0000 New bag of NS with K hung and vanco initiated. Pt refusing to take any PO medications. Pt was educated on the importance of taking his lamictal and depakote and he continued to refuse and became increasingly agitated stating \"I'm gonna get angry\". Pt is also refusing to let RN put new tele leads on him. Dr. Sinan Srivastava notified via perfect serve and discussed on phone. Electronically signed by Edgard Lozoya RN on 7/31/2020 at 12:10 AM    0017 Called pt's LG, his mother Flash Campos. She spoke with pt and convinced him to take his lamictal and depakote. Electronically signed by Edgard Lozoya RN on 7/31/2020 at 12:38 AM     Pt is easily agitated and becomes verbally aggressive with staff when trying to administer medications. He repeatedly states he is not sick and he does not want to be here.   Electronically signed by Edgard Lozoya RN on 7/31/2020 at 2:10 AM

## 2020-07-31 NOTE — CONSULTS
Inpatient consult to GI  Consult performed by: Laquetta Prader, MD  Consult ordered by: Ivania Ramirez MD          Patient Name: Celeste Shah Date: 2020  3:07 PM  MR #: [id number]  : 1970    Attending Physician: Lavetta Mohs, MD  Reason for consult: abnormal CT scan    History of Presenting Illness:      Daria Schmitt is a 44 y.o. male on hospital day 1 with a history of MRDD, Lennox Gestaut syndrome, epilepsy, neurogenic bladder, hypothyroidism, hypertension, history of DVT, DM 2. Past surgical history is significant for recent lithotripsy with double-J stent placement. Fm hx was rev'd with his mother Nehemias De La Cruz and is negative for GI malignancies. history Obtained From:  nurse, his mother Nehemias De La Cruz and the electronic medical record. GI was asked to evaluate abnormal CT imaging-patient resides in a nursing facility and was sent to the emergency department for fever of 103 and abnormal electrolytes, noted gram negative urine, has been receiving IV Zosyn. Has been afebrile since arrival.  CT of the abdomen pelvis noted scattered amount of stool throughout the large bowel to the level of the descending colon with the sigmoid colon dilated and markedly redundant measuring up to 6.8 cm. At the distal sigmoid and beginning within the region of the rectum there is diffuse mural thickening which extends from the distal sigmoid to the anus with surrounding perisigmoid and perirectal/perianal inflammatory stranding, the inflammation is suggestive of an underlying infectious etiology. His medical history was discussed with his mother , he has no previous history of colonoscopy, no family history of IBD or CRC. She reports that he has suffered from chronic constipation/obstipation for greater than 10 years.        History:      Past Medical History:   Diagnosis Date    CKD (chronic kidney disease)     Diabetes mellitus ([facility] [address]     Has a tremor     Hyperlipidemia     Hypokalemia     Intellectual disability     Kidney stone     Lennox-Gastaut syndrome (HCC)     Paralytic ileus ([facility] [address]     Thyroid disease     Venous thrombosis and embolism      Past Surgical History:   Procedure Laterality Date    LITHOTRIPSY  07/30/2020     Family History  History reviewed. No pertinent family history.   [] Unable to obtain due to ventilated and/ or neurologic status  Social History     Socioeconomic History    Marital status: Single     Spouse name: Not on file    Number of children: Not on file    Years of education: Not on file    Highest education level: Not on file   Occupational History    Not on file   Social Needs    Financial resource strain: Not on file    Food insecurity     Worry: Not on file     Inability: Not on file    Transportation needs     Medical: Not on file     Non-medical: Not on file   Tobacco Use    Smoking status: Never Smoker    Smokeless tobacco: Never Used   Substance and Sexual Activity    Alcohol use: Never     Frequency: Never    Drug use: Never    Sexual activity: Not on file   Lifestyle    Physical activity     Days per week: Not on file     Minutes per session: Not on file    Stress: Not on file   Relationships    Social connections     Talks on phone: Not on file     Gets together: Not on file     Attends Yazdanism service: Not on file     Active member of club or organization: Not on file     Attends meetings of clubs or organizations: Not on file     Relationship status: Not on file    Intimate partner violence     Fear of current or ex partner: Not on file     Emotionally abused: Not on file     Physically abused: Not on file     Forced sexual activity: Not on file   Other Topics Concern    Not on file   Social History Narrative    Not on file      [] Unable to obtain due to ventilated and/ or neurologic status    Home Medications:      Medications Prior to Admission: baclofen (LIORESAL) 10 MG tablet, Take 10 mg by mouth every morning  divalproex (DEPAKOTE ER) 250 MG extended release tablet, Take 750 mg by mouth 2 times daily  divalproex (DEPAKOTE ER) 500 MG extended release tablet, Take 500 mg by mouth nightly  Bisacodyl (DULCOLAX RE), Place 1 Units rectally daily as needed (constipation)  Sodium Phosphates (FLEET) 7-19 GM/118ML, Place 1 enema rectally daily as needed (constipation)  gabapentin (NEURONTIN) 100 MG capsule, Take 100 mg by mouth 3 times daily. lamoTRIgine (LAMICTAL) 200 MG tablet, Take 200 mg by mouth 2 times daily  magnesium hydroxide (MILK OF MAGNESIA) 400 MG/5ML suspension, Take 30 mLs by mouth daily as needed for Constipation  polyethylene glycol (GLYCOLAX) 17 g packet, Take 17 g by mouth daily as needed for Constipation  potassium chloride (KLOR-CON M) 20 MEQ TBCR extended release tablet, Take 60 mEq by mouth 2 times daily  propranolol (INDERAL LA) 60 MG extended release capsule, Take 60 mg by mouth daily  levothyroxine (SYNTHROID) 50 MCG tablet, Take 50 mcg by mouth Daily    Current Hospital Medications:   Scheduled Meds:   magnesium sulfate  4 g Intravenous Once    vancomycin  1,250 mg Intravenous Q12H    baclofen  10 mg Oral QAM    bisacodyl  10 mg Rectal Daily    divalproex  750 mg Oral BID    divalproex  500 mg Oral Nightly    gabapentin  100 mg Oral TID    lamoTRIgine  200 mg Oral BID    levothyroxine  50 mcg Oral Daily    polyethylene glycol  17 g Oral Daily    potassium chloride  40 mEq Oral Daily    propranolol  60 mg Oral Daily    sodium chloride flush  10 mL Intravenous 2 times per day    enoxaparin  40 mg Subcutaneous Daily    piperacillin-tazobactam  3.375 g Intravenous Q8H     Continuous Infusions:   IV infusion builder 100 mL/hr at 07/31/20 0003     PRN Meds:.magnesium hydroxide, sodium chloride flush, acetaminophen **OR** acetaminophen   IV infusion builder 100 mL/hr at 07/31/20 0003      Allergies:      Allergies   Allergen Reactions    Cefazolin     Celontin [Methsuximide]     Duricef [Cefadroxil]  Simvastatin     Vicodin [Hydrocodone-Acetaminophen]       Review of Systems:       [x] CV, Resp, Neuro, , and all other systems reviewed and negative other than listed in HPI.         Objective Findings:     Vitals:   Vitals:    07/30/20 2015 07/30/20 2018 07/31/20 0546 07/31/20 0851   BP:  (!) 103/54  (!) 118/46   Pulse: 98 103  101   Resp: 20 20 20   Temp: 99.1 °F (37.3 °C) 99.1 °F (37.3 °C)  98.2 °F (36.8 °C)   TempSrc: Oral Oral  Oral   SpO2: 94% 98%  93%   Weight:   262 lb 14.4 oz (119.3 kg)    Height:            Physical Examination: Limited due to COVID 19 isolation precautions  General: alert to self only  HEENT: Normocephalic,  scleral icterus. Neck: No JVD. Heart: Regular, no murmur, no rub/gallop. No RV heave. Lungs: Clear to ascultation, no rales/wheezing/rhonchi. Good chest wall excursion. Abdomen: Appearance:, mild Distension, Soft, no tenderness, Bowel sounds present  Extremities: No clubbing/cyanosis, no edema. Skin: Warm, dry, normal turgor, no rash, no bruise, no petichiae. Neuro: No myoclonus or tremor.    Psych:   Results/ Medications reviewed 7/31/2020, 1:13 PM     Laboratory, Microbiology, Pathology, Radiology, Cardiology, Medications and Transcriptions reviewed  Scheduled Meds:   magnesium sulfate  4 g Intravenous Once    vancomycin  1,250 mg Intravenous Q12H    baclofen  10 mg Oral QAM    bisacodyl  10 mg Rectal Daily    divalproex  750 mg Oral BID    divalproex  500 mg Oral Nightly    gabapentin  100 mg Oral TID    lamoTRIgine  200 mg Oral BID    levothyroxine  50 mcg Oral Daily    polyethylene glycol  17 g Oral Daily    potassium chloride  40 mEq Oral Daily    propranolol  60 mg Oral Daily    sodium chloride flush  10 mL Intravenous 2 times per day    enoxaparin  40 mg Subcutaneous Daily    piperacillin-tazobactam  3.375 g Intravenous Q8H     Continuous Infusions:   IV infusion builder 100 mL/hr at 07/31/20 0003       Recent Labs     07/30/20  4877 07/31/20  0615   WBC 12.2* 10.7   HGB 12.2* 9.3*   HCT 37.7* 28.5*   MCV 86.0 86.2    192     Recent Labs     07/30/20  1545 07/31/20  0615    144   K 3.0* 2.7*    110*   CO2 26 21   BUN 12 15   CREATININE 0.87 0.92     Recent Labs     07/30/20  1545   AST 17   ALT 8   BILITOT 0.5   ALKPHOS 59     Recent Labs     07/30/20  1745   LIPASE 25     Recent Labs     07/30/20  1545   PROT 7.2     Ct Abdomen Pelvis W Iv Contrast Additional Contrast? None    Result Date: 7/30/2020  Examination: CT ABDOMEN PELVIS W IV CONTRAST Indication:   distention, concerns for obstruction Technique: Multiple serial axial images was performed through the abdomen and pelvis utilizing 100cc of Optiray 370. Images were reconstructed in the axial and coronal and sagittal planes. Comparison: Findings: There is an area of focal consolidation/infiltrate within the right lower lobe. Mass is not excluded. Measures 3.9 x 1.4 cm. There is trace right pleural effusion. The liver, The spleen, pancreas, adrenals,  are unremarkable. The gallbladder is distended and there is increased attenuation seen layering dependently in the gallbladder which could represent accommodation gallbladder stones and/or sludge. The right kidney shows a double-J stent. Proximal portion lies within the right renal pelvis and the distal lies within the bladder. The bladder also contains a Jameson catheter in the bladder is partially decompressed. There is a calculi seen within the mid to upper pole of the kidney measuring approximately 1.6 cm. There is a small amount of punctate air within the inferior pelvis. Likely secondary to instrumentation. The left kidney is at the lower limits of normal. No hydronephrosis. There is stool scattered throughout the large bowel to level of the descending colon. The sigmoid is dilated and markedly redundant. It measures up to 6.8 cm.  At the distal sigmoid and beginning within the region of the rectum there is diffuse mural thickening which extends from the distal sigmoid to the anus. There is surrounding perisigmoid and perirectal/perianal inflammatory stranding as well as small amount of presacral inflammatory stranding. Small bilateral renal hernias containing mesenteric fat. The appendix is not visualized. . No free air. There is a small amount of free fluid beginning at the inferior aspect of the right lobe of liver extending into the right paracolic gutter. No free fluid. The visualized abdominal aorta is of normal size and caliber. No significant retroperitoneal adenopathy. There is multilevel degenerative changes of lumbar spine with narrowing of the L5-S1 disc space. 1. THE SIGMOID IS MARKEDLY REDUNDANT AND THE MID TO DISTAL DILATED AS DESCRIBED ABOVE. THERE IS A TRANSITION POINT, NARROWING WITH  MURAL THICKENING BEGINNING AT THE DISTAL SIGMOID AND EXTENDING TO THE ANUS WITH SURROUNDING PERISIGMOID AND PERIRECTAL PERIANAL INFLAMMATORY STRANDING. FINDINGS ARE MORE SUGGESTIVE OF A MECHANICAL ILEUS DUE TO THE NARROWING. FINDINGS COULD REPRESENT A DIFFUSE UNDERLYING INFLAMMATORY INFECTIOUS ETIOLOGY. FURTHER EVALUATION IS WARRANTED. Verito Turner 2. THE GALLBLADDER IS DISTENDED AND THERE IS INCREASED ATTENUATION LAYERING DEPENDENTLY SUGGESTING GALLBLADDER SLUDGE OR STONES. CORRELATE CLINICALLY. CONSIDER FOLLOW-UP ULTRASOUND TO FURTHER EVALUATE 2. RIGHT-SIDED DOUBLE-J STENT WITH ASSOCIATED RIGHT-SIDED CHOLELITHIASIS AS DESCRIBED ABOVE. SMALL AMOUNT OF AIR WITHIN THE COLLECTING SYSTEM WHICH MAY BE RELATED TO INSTRUMENTATION. CORRELATE WITH URINALYSIS. 3. AREA OF FOCAL INFILTRATE CONSOLIDATION WITHIN THE RIGHT LOWER LOBE ALTHOUGH UNDERLYING SOFT TISSUE MASS IS NOT EXCLUDED WITH TRACE PLEURAL EFFUSION. WILL NEED FURTHER EVALUATION FOLLOW-UP TO RESOLUTION. 4. THERE IS SMALL AMOUNT OF FREE FLUID AT THE INFERIOR ASPECT OF THE RIGHT LOBE OF LIVER AND EXTENDING TO THE RIGHT PARACOLIC GUTTER.  All CT scans at this facility use dose modulation, iterative reconstruction, and/or weight based dosing when appropriate to reduce radiation dose to as low as reasonably achievable. Xr Chest Portable    Result Date: 7/31/2020  XR CHEST PORTABLE : 7/30/2020 5:45 PM CLINICAL HISTORY:  fever . COMPARISON: CT abdomen and pelvis from earlier 7/30/2020. TECHNIQUE: Two portable upright AP radiographs of the chest were obtained. FINDINGS: A poor inspiratory volume is present with mild to moderate right basilar infiltrate/consolidation. Bronchopneumonia should be excluded clinically. The heart is mildly enlarged, exaggerated by technique. Probable brain stimulator generator overlies the left hemithorax. There is no significant pleural effusion, gross vascular congestion, pneumothorax, or displaced fractures identified. POOR INSPIRATION WITH MILD TO MODERATE RIGHT BASILAR INFILTRATE/CONSOLIDATION. WITHOUT PRIOR STUDIES FOR COMPARISON, ATELECTASIS AND/OR SCARRING OR POSSIBLE. BRONCHOPNEUMONIA SHOULD BE EXCLUDED CLINICALLY. Impression:   78-year-old male admitted from nursing home with fever, leukocytosis, abnormal electrolytes, gram-negative bacteria in his urine. Had abnormal CT imaging concerning for distal colonic narrowing and possible underlying infectious versus other obstructive process. Patient denies abdominal pain, mother reports long history of constipation, no recent wt loss, melena, or hematochezia. Lengthy discussion with his mother Ghislaine Escobar in consideration of shared decision making- CT results rev'd with his mother, discussed the possibility of an underlying infection versus colonic malignancy, at this time she does not wish to proceed with endoscopic investigation, however this is an ongoing decision that will be based on his clinical progress. Plan:   1. continue course of treatment  2. Ongoing evaluation for repeat CT imaging and or endoscopic investigation based on clinical progress. Comments:      Thank you for allowing us to participate in the care of this patient. Will continue to follow. Please call if questions or concerns arise. A/P  Agree regarding assessment and plan. We are consulted regarding abnormal CT imaging for? Underlying infectious versus obstructive process. Patient ultimately would need a colonoscopy evaluation, timing based on clinical course. Currently ongoing sepsis with bacteremia secondary to UTI. Of note patient's mother does not wish to proceed with endoscopy intervention at this time. We will continue to follow and further decision pending clinical course  Andreina Mansfield MD  Please note this report has been partially produced using speech recognition software and may cause contain errors related to that system including grammar, punctuation and spelling as well as words and phrases that may seem inappropriate. If there are questions or concerns please feel free to contact me to clarify.

## 2020-07-31 NOTE — CONSULTS
Infectious Diseases Inpatient Consult Note      Reason for Consult:   Bacteremia  Requesting Physician:   DR Yesenia Antoine  Primary Care Physician:  Natty Leon MD  History Obtained From:   Pt, EPIC    Admit Date: 7/30/2020  Hospital Day: 2      HISTORY OF PRESENT ILLNESS:  This is a 44 y.o. male was admitted to [facility] from nursing home  through ER with acute onset of high-grade fevers and hypokalemia. Patient underwent lithotripsy today for right renal stone. Was started on IV Zosyn. Blood culture and urine culture came back positive for ESBL Klebsiella pneumonia. . He has a Jameson catheter with cloudy urine. past medical history of MRDD, [place] syndrome, epilepsy, neurogenic bladder(requiring straight cath), hypothyroidism, hypertension history of DVT in left leg in 2013     CHIEF COMPLAINT:      Past Medical History:   Diagnosis Date    CKD (chronic kidney disease)     [facility] mellitus ([facility] [address]     Has a tremor     Hyperlipidemia     Hypokalemia     Intellectual disability     Kidney stone     Lennox-Gastaut syndrome (HCC)     Paralytic ileus ([facility] [address]     Thyroid disease     Venous thrombosis and embolism        Past Surgical History:   Procedure Laterality Date    LITHOTRIPSY  07/30/2020       Current Medications:     magnesium sulfate  4 g Intravenous Once    meropenem  1 g Intravenous Q8H    baclofen  10 mg Oral QAM    bisacodyl  10 mg Rectal Daily    divalproex  750 mg Oral BID    divalproex  500 mg Oral Nightly    gabapentin  100 mg Oral TID    lamoTRIgine  200 mg Oral BID    levothyroxine  50 mcg Oral Daily    polyethylene glycol  17 g Oral Daily    potassium chloride  40 mEq Oral Daily    propranolol  60 mg Oral Daily    sodium chloride flush  10 mL Intravenous 2 times per day    enoxaparin  40 mg Subcutaneous Daily       Allergies:  Cefazolin; Celontin [methsuximide]; Duricef [cefadroxil];  Simvastatin; and Vicodin [hydrocodone-acetaminophen]    Social History     Socioeconomic History    Marital status: Single     Spouse name: Not on file    Number of children: Not on file    Years of education: Not on file    Highest education level: Not on file   Occupational History    Not on file   Social Needs    Financial resource strain: Not on file    Food insecurity     Worry: Not on file     Inability: Not on file    Transportation needs     Medical: Not on file     Non-medical: Not on file   Tobacco Use    Smoking status: Never Smoker    Smokeless tobacco: Never Used   Substance and Sexual Activity    Alcohol use: Never     Frequency: Never    Drug use: Never    Sexual activity: Not on file   Lifestyle    Physical activity     Days per week: Not on file     Minutes per session: Not on file    Stress: Not on file   Relationships    Social connections     Talks on phone: Not on file     Gets together: Not on file     Attends [employer] service: Not on file     Active member of club or organization: Not on file     Attends meetings of clubs or organizations: Not on file     Relationship status: Not on file    Intimate partner violence     Fear of current or ex partner: Not on file     Emotionally abused: Not on file     Physically abused: Not on file     Forced sexual activity: Not on file   Other Topics Concern    Not on file   Social History Narrative    Not on file         Family History:   History reviewed. No pertinent family history. Review of Systems  unable to provide ROS because of MRDD. Physical Exam  Vitals:    07/30/20 2015 07/30/20 2018 07/31/20 0546 07/31/20 0851   BP:  (!) 103/54  (!) 118/46   Pulse: 98 103  101   Resp: 20 20 20   Temp: 99.1 °F (37.3 °C) 99.1 °F (37.3 °C)  98.2 °F (36.8 °C)   TempSrc: Oral Oral  Oral   SpO2: 94% 98%  93%   Weight:   262 lb 14.4 oz (119.3 kg)    Height:         General Appearance: alert , well-developed and well-nourished, in no acute distress  Unable to answer any questions  On room air  Skin: warm and dry, no rash.    Head: normocephalic and atraumatic  Eyes: extraocular eye movements intact, conjunctivae normal, anicteric sclerae  ENT: oropharynx clear and moist with normal mucous membranes. No thrush  Lungs: normal respiratory effort, Clear Lungs, no rhonchi, no crackles, no wheezes  Heart:RRR, nl S1/S2, no murmur  Abdomen: soft, no tenderness, no H-S-megaly, + BS  Jameson catheter with cloudy urine  NEUROLOGICAL: alert and oriented x 3, no focal deficits  +leg edema  No erythema, no warmth, no tenderness        DATA:    Lab Results   Component Value Date    WBC 10.7 07/31/2020    HGB 9.3 (L) 07/31/2020    HCT 28.5 (L) 07/31/2020    MCV 86.2 07/31/2020     07/31/2020     Lab Results   Component Value Date    CREATININE 0.92 07/31/2020    BUN 15 07/31/2020     07/31/2020    K 3.5 07/31/2020     (H) 07/31/2020    CO2 21 07/31/2020       Hepatic Function Panel:   Lab Results   Component Value Date    ALKPHOS 59 07/30/2020    ALT 8 07/30/2020    AST 17 07/30/2020    PROT 7.2 07/30/2020    BILITOT 0.5 07/30/2020    LABALBU 3.8 07/30/2020       Microbiology:   Recent Labs     07/30/20  1801   BC Gram stain aerobic bottle  Gram negative rods  1 out of 2 blood cultures  Further results to follow  *  The following organisms and resistance markers have been  tested using nucleic acid testing technology. ORGANISMS:  Escherica coli, Klebisella pneumonia, Klebsiella oxytoca,  Pseudomonas aeruginosa, Enterobacter species, Proteus species,  Citrobacter species, Acinetobacter species. CARBAPENEM (CRE) RESISTANCE MARKERS:  kpc, imp, ndm, vim, oxa  ESBL RESISTANCE MARKER:  ctx-m  CONTACT PRECAUTIONS INDICATED  *     No results for input(s): BLOODCULT2 in the last 72 hours. Recent Labs     07/30/20 2124   LABURIN >100,000 CFU/ml  ID and sensitivity to follow       No results for input(s): WNDABS in the last 72 hours. No results for input(s): CULTRESP in the last 72 hours. Imaging:   Impression    1.  THE SIGMOID IS MARKEDLY REDUNDANT AND THE MID TO DISTAL DILATED AS DESCRIBED ABOVE. THERE IS A TRANSITION POINT, NARROWING WITH  MURAL THICKENING BEGINNING AT THE DISTAL SIGMOID AND EXTENDING TO THE ANUS WITH SURROUNDING PERISIGMOID AND PERIRECTAL    PERIANAL INFLAMMATORY STRANDING. FINDINGS ARE MORE SUGGESTIVE OF A MECHANICAL ILEUS DUE TO THE NARROWING. FINDINGS COULD REPRESENT A DIFFUSE UNDERLYING INFLAMMATORY INFECTIOUS ETIOLOGY. FURTHER EVALUATION IS WARRANTED. Gail Blanca 2. THE GALLBLADDER IS DISTENDED AND THERE IS INCREASED ATTENUATION LAYERING DEPENDENTLY SUGGESTING GALLBLADDER SLUDGE OR STONES. CORRELATE CLINICALLY. CONSIDER FOLLOW-UP ULTRASOUND TO FURTHER EVALUATE    2. RIGHT-SIDED DOUBLE-J STENT WITH ASSOCIATED RIGHT-SIDED CHOLELITHIASIS AS DESCRIBED ABOVE. SMALL AMOUNT OF AIR WITHIN THE COLLECTING SYSTEM WHICH MAY BE RELATED TO INSTRUMENTATION. CORRELATE WITH URINALYSIS. 3. AREA OF FOCAL INFILTRATE CONSOLIDATION WITHIN THE RIGHT LOWER LOBE ALTHOUGH UNDERLYING SOFT TISSUE MASS IS NOT EXCLUDED WITH TRACE PLEURAL EFFUSION. WILL NEED FURTHER EVALUATION FOLLOW-UP TO RESOLUTION.     4. THERE IS SMALL AMOUNT OF FREE FLUID AT THE INFERIOR ASPECT OF THE RIGHT LOBE OF LIVER AND EXTENDING TO THE RIGHT PARACOLIC GUTTER.               IMPRESSION:    · ESBL Klebsiella UTI with sepsis  · Kidney stone with hydronephrosis status post right double-J stent placement    Patient Active Problem List   Diagnosis    Sepsis ([facility] [address]       PLAN:  · Change Zosyn to meropenem  · Check culture and sensitivity  · DC isolation  · Cancel second COVID test    Discussed with patient and FRENCH Meadows MD

## 2020-08-01 ENCOUNTER — APPOINTMENT (OUTPATIENT)
Dept: ULTRASOUND IMAGING | Age: 50
DRG: 698 | End: 2020-08-01
Payer: MEDICARE

## 2020-08-01 LAB
ANION GAP SERPL CALCULATED.3IONS-SCNC: 12 MEQ/L (ref 9–15)
BASOPHILS ABSOLUTE: 0 K/UL (ref 0–0.2)
BASOPHILS RELATIVE PERCENT: 0.4 %
BUN BLDV-MCNC: 14 MG/DL (ref 6–20)
CALCIUM SERPL-MCNC: 8 MG/DL (ref 8.5–9.9)
CHLORIDE BLD-SCNC: 110 MEQ/L (ref 95–107)
CO2: 21 MEQ/L (ref 20–31)
CREAT SERPL-MCNC: 0.77 MG/DL (ref 0.7–1.2)
EOSINOPHILS ABSOLUTE: 0.1 K/UL (ref 0–0.7)
EOSINOPHILS RELATIVE PERCENT: 0.7 %
GFR AFRICAN AMERICAN: >60
GFR AFRICAN AMERICAN: >60
GFR NON-AFRICAN AMERICAN: >60
GFR NON-AFRICAN AMERICAN: >60
GLUCOSE BLD-MCNC: 99 MG/DL (ref 70–99)
HCT VFR BLD CALC: 25.9 % (ref 42–52)
HEMOGLOBIN: 8.6 G/DL (ref 14–18)
LYMPHOCYTES ABSOLUTE: 1.3 K/UL (ref 1–4.8)
LYMPHOCYTES RELATIVE PERCENT: 15.6 %
MAGNESIUM: 2.2 MG/DL (ref 1.7–2.4)
MCH RBC QN AUTO: 28.6 PG (ref 27–31.3)
MCHC RBC AUTO-ENTMCNC: 33.1 % (ref 33–37)
MCV RBC AUTO: 86.5 FL (ref 80–100)
MONOCYTES ABSOLUTE: 1 K/UL (ref 0.2–0.8)
MONOCYTES RELATIVE PERCENT: 11.8 %
NEUTROPHILS ABSOLUTE: 6 K/UL (ref 1.4–6.5)
NEUTROPHILS RELATIVE PERCENT: 71.5 %
ORGANISM: ABNORMAL
PDW BLD-RTO: 18.7 % (ref 11.5–14.5)
PERFORMED ON: NORMAL
PLATELET # BLD: 185 K/UL (ref 130–400)
POC CREATININE: 0.9 MG/DL (ref 0.9–1.3)
POC SAMPLE TYPE: NORMAL
POTASSIUM REFLEX MAGNESIUM: 3.1 MEQ/L (ref 3.4–4.9)
RBC # BLD: 2.99 M/UL (ref 4.7–6.1)
SODIUM BLD-SCNC: 143 MEQ/L (ref 135–144)
URINE CULTURE, ROUTINE: ABNORMAL
WBC # BLD: 8.4 K/UL (ref 4.8–10.8)

## 2020-08-01 PROCEDURE — 36415 COLL VENOUS BLD VENIPUNCTURE: CPT

## 2020-08-01 PROCEDURE — 99232 SBSQ HOSP IP/OBS MODERATE 35: CPT | Performed by: INTERNAL MEDICINE

## 2020-08-01 PROCEDURE — 80048 BASIC METABOLIC PNL TOTAL CA: CPT

## 2020-08-01 PROCEDURE — 83735 ASSAY OF MAGNESIUM: CPT

## 2020-08-01 PROCEDURE — 6360000002 HC RX W HCPCS: Performed by: INTERNAL MEDICINE

## 2020-08-01 PROCEDURE — 2580000003 HC RX 258: Performed by: INTERNAL MEDICINE

## 2020-08-01 PROCEDURE — 85025 COMPLETE CBC W/AUTO DIFF WBC: CPT

## 2020-08-01 PROCEDURE — 76705 ECHO EXAM OF ABDOMEN: CPT

## 2020-08-01 PROCEDURE — 2060000000 HC ICU INTERMEDIATE R&B

## 2020-08-01 PROCEDURE — 99231 SBSQ HOSP IP/OBS SF/LOW 25: CPT | Performed by: SURGERY

## 2020-08-01 PROCEDURE — 6370000000 HC RX 637 (ALT 250 FOR IP): Performed by: INTERNAL MEDICINE

## 2020-08-01 RX ORDER — POTASSIUM CHLORIDE 20 MEQ/1
40 TABLET, EXTENDED RELEASE ORAL ONCE
Status: COMPLETED | OUTPATIENT
Start: 2020-08-01 | End: 2020-08-01

## 2020-08-01 RX ADMIN — MEROPENEM 1 G: 1 INJECTION, POWDER, FOR SOLUTION INTRAVENOUS at 16:32

## 2020-08-01 RX ADMIN — LEVOTHYROXINE SODIUM 50 MCG: 0.05 TABLET ORAL at 09:37

## 2020-08-01 RX ADMIN — POTASSIUM CHLORIDE 40 MEQ: 20 TABLET, EXTENDED RELEASE ORAL at 09:38

## 2020-08-01 RX ADMIN — GABAPENTIN 100 MG: 100 CAPSULE ORAL at 14:48

## 2020-08-01 RX ADMIN — MEROPENEM 1 G: 1 INJECTION, POWDER, FOR SOLUTION INTRAVENOUS at 09:35

## 2020-08-01 RX ADMIN — MEROPENEM 1 G: 1 INJECTION, POWDER, FOR SOLUTION INTRAVENOUS at 02:43

## 2020-08-01 RX ADMIN — ENOXAPARIN SODIUM 40 MG: 40 INJECTION SUBCUTANEOUS at 09:37

## 2020-08-01 RX ADMIN — GABAPENTIN 100 MG: 100 CAPSULE ORAL at 09:37

## 2020-08-01 RX ADMIN — LAMOTRIGINE 200 MG: 200 TABLET ORAL at 20:10

## 2020-08-01 RX ADMIN — Medication 10 ML: at 09:36

## 2020-08-01 RX ADMIN — DIVALPROEX SODIUM 750 MG: 500 TABLET, EXTENDED RELEASE ORAL at 14:48

## 2020-08-01 RX ADMIN — SODIUM CHLORIDE: 9 INJECTION, SOLUTION INTRAVENOUS at 16:34

## 2020-08-01 RX ADMIN — SODIUM CHLORIDE: 9 INJECTION, SOLUTION INTRAVENOUS at 00:13

## 2020-08-01 RX ADMIN — BACLOFEN 10 MG: 10 TABLET ORAL at 09:37

## 2020-08-01 RX ADMIN — PROPRANOLOL HYDROCHLORIDE 60 MG: 60 CAPSULE, EXTENDED RELEASE ORAL at 09:36

## 2020-08-01 RX ADMIN — DIVALPROEX SODIUM 750 MG: 500 TABLET, EXTENDED RELEASE ORAL at 09:37

## 2020-08-01 RX ADMIN — DIVALPROEX SODIUM 500 MG: 500 TABLET, FILM COATED, EXTENDED RELEASE ORAL at 20:11

## 2020-08-01 RX ADMIN — GABAPENTIN 100 MG: 100 CAPSULE ORAL at 20:11

## 2020-08-01 RX ADMIN — LAMOTRIGINE 200 MG: 200 TABLET ORAL at 09:37

## 2020-08-01 ASSESSMENT — PAIN SCALES - GENERAL
PAINLEVEL_OUTOF10: 0

## 2020-08-01 ASSESSMENT — ENCOUNTER SYMPTOMS
VOMITING: 0
COUGH: 0
NAUSEA: 0
DIARRHEA: 0
SHORTNESS OF BREATH: 0

## 2020-08-01 NOTE — PROGRESS NOTES
Infectious Disease     Patient Name: Ty Brower  Date: 8/1/2020  YOB: 1970  Medical Record Number: [id number]        Sepsis with pyelonephritis  ESBL Klebsiella pneumonia        Review of Systems   Unable to perform ROS: Other       Review of Systems: All 14 review of systems negative other than as stated above    Social History     Tobacco Use    Smoking status: Never Smoker    Smokeless tobacco: Never Used   Substance Use Topics    Alcohol use: Never     Frequency: Never    Drug use: Never         Past Medical History:   Diagnosis Date    CKD (chronic kidney disease)     Diabetes mellitus ([facility] [address]     Has a tremor     Hyperlipidemia     Hypokalemia     Intellectual disability     Kidney stone     Lennox-Gastaut syndrome ([facility] [address]     Paralytic ileus ([facility] [address]     Thyroid disease     Venous thrombosis and embolism            Past Surgical History:   Procedure Laterality Date    LITHOTRIPSY  07/30/2020         No current facility-administered medications on file prior to encounter. Current Outpatient Medications on File Prior to Encounter   Medication Sig Dispense Refill    baclofen (LIORESAL) 10 MG tablet Take 10 mg by mouth every morning      divalproex (DEPAKOTE ER) 250 MG extended release tablet Take 750 mg by mouth 2 times daily      divalproex (DEPAKOTE ER) 500 MG extended release tablet Take 500 mg by mouth nightly      Bisacodyl (DULCOLAX RE) Place 1 Units rectally daily as needed (constipation)      Sodium Phosphates (FLEET) 7-19 GM/118ML Place 1 enema rectally daily as needed (constipation)      gabapentin (NEURONTIN) 100 MG capsule Take 100 mg by mouth 3 times daily.       lamoTRIgine (LAMICTAL) 200 MG tablet Take 200 mg by mouth 2 times daily      magnesium hydroxide (MILK OF MAGNESIA) 400 MG/5ML suspension Take 30 mLs by mouth daily as needed for Constipation      polyethylene glycol (GLYCOLAX) 17 g packet Take 17 g by mouth daily as needed for Constipation      potassium chloride (KLOR-CON M) 20 MEQ TBCR extended release tablet Take 60 mEq by mouth 2 times daily      propranolol (INDERAL LA) 60 MG extended release capsule Take 60 mg by mouth daily      levothyroxine (SYNTHROID) 50 MCG tablet Take 50 mcg by mouth Daily         Allergies   Allergen Reactions    Cefazolin     Celontin [Methsuximide]     Duricef [Cefadroxil]     Simvastatin     Vicodin [Hydrocodone-Acetaminophen]          History reviewed. No pertinent family history. Physical Exam:      Physical Exam   Constitutional: No distress. Cardiovascular: Normal heart sounds. No murmur heard. Pulmonary/Chest: Effort normal and breath sounds normal. No respiratory distress. He has no wheezes. He has no rales. He exhibits no tenderness. Abdominal: Soft. Bowel sounds are normal. He exhibits no distension. There is no abdominal tenderness. There is no rebound and no guarding. Skin: He is not diaphoretic. Blood pressure (!) 118/58, pulse 78, temperature 97.6 °F (36.4 °C), temperature source Axillary, resp. rate 16, height 6' (1.829 m), weight 258 lb 4.8 oz (117.2 kg), SpO2 100 %.       .   Lab Results   Component Value Date    WBC 8.4 08/01/2020    HGB 8.6 (L) 08/01/2020    HCT 25.9 (L) 08/01/2020    MCV 86.5 08/01/2020     08/01/2020     Lab Results   Component Value Date     08/01/2020    K 3.1 08/01/2020     08/01/2020    CO2 21 08/01/2020    BUN 14 08/01/2020    CREATININE 0.77 08/01/2020    GLUCOSE 99 08/01/2020    CALCIUM 8.0 08/01/2020        Culture, Blood 1 [[id number]]  (Abnormal)  Collected: 07/30/20 1801    Order Status: Completed  Specimen: Blood  Updated: 08/01/20 0804     Blood Culture, Routine  --Abnormal       Gram stain aerobic bottle   Gram negative rods   1 out of 2 blood cultures   Further results to follow   Abnormal       Organism  Klebsiella pneumoniae ESBL DNA DetectedAbnormal       Blood Culture, Routine  --Abnormal       The following organisms and through the abdomen and pelvis utilizing 100cc of Optiray 370.   Images were reconstructed in the axial and coronal and sagittal planes.         Comparison:         Findings:         There is an area of focal consolidation/infiltrate within the right lower lobe. Mass is not excluded. Measures 3.9 x 1.4 cm. There is trace right pleural effusion.         The liver,         The spleen, pancreas, adrenals,  are unremarkable.         The gallbladder is distended and there is increased attenuation seen layering dependently in the gallbladder which could represent accommodation gallbladder stones and/or sludge.         The right kidney shows a double-J stent. Proximal portion lies within the right renal pelvis and the distal lies within the bladder. The bladder also contains a Jameson catheter in the bladder is partially decompressed. There is a calculi seen within the mid to upper pole of the kidney measuring approximately 1.6 cm. There is a small amount of punctate air within the inferior pelvis. Likely secondary to instrumentation. The left kidney is at the lower limits of normal. No hydronephrosis.         There is stool scattered throughout the large bowel to level of the descending colon. The sigmoid is dilated and markedly redundant. It measures up to 6.8 cm. At the distal sigmoid and beginning within the region of the rectum there is diffuse mural    thickening which extends from the distal sigmoid to the anus. There is surrounding perisigmoid and perirectal/perianal inflammatory stranding as well as small amount of presacral inflammatory stranding.         Small bilateral renal hernias containing mesenteric fat.         The appendix is not visualized. .         No free air.           There is a small amount of free fluid beginning at the inferior aspect of the right lobe of liver extending into the right paracolic gutter.  No free fluid.  The visualized abdominal aorta is of normal size and caliber.  No significant retroperitoneal    adenopathy.         There is multilevel degenerative changes of lumbar spine with narrowing of the L5-S1 disc space.              Impression    1. THE SIGMOID IS MARKEDLY REDUNDANT AND THE MID TO DISTAL DILATED AS DESCRIBED ABOVE. THERE IS A TRANSITION POINT, NARROWING WITH  MURAL THICKENING BEGINNING AT THE DISTAL SIGMOID AND EXTENDING TO THE ANUS WITH SURROUNDING PERISIGMOID AND PERIRECTAL    PERIANAL INFLAMMATORY STRANDING. FINDINGS ARE MORE SUGGESTIVE OF A MECHANICAL ILEUS DUE TO THE NARROWING. FINDINGS COULD REPRESENT A DIFFUSE UNDERLYING INFLAMMATORY INFECTIOUS ETIOLOGY. FURTHER EVALUATION IS WARRANTED. Kobe Jade 2. THE GALLBLADDER IS DISTENDED AND THERE IS INCREASED ATTENUATION LAYERING DEPENDENTLY SUGGESTING GALLBLADDER SLUDGE OR STONES. CORRELATE CLINICALLY. CONSIDER FOLLOW-UP ULTRASOUND TO FURTHER EVALUATE    2. RIGHT-SIDED DOUBLE-J STENT WITH ASSOCIATED RIGHT-SIDED CHOLELITHIASIS AS DESCRIBED ABOVE. SMALL AMOUNT OF AIR WITHIN THE COLLECTING SYSTEM WHICH MAY BE RELATED TO INSTRUMENTATION. CORRELATE WITH URINALYSIS. 3. AREA OF FOCAL INFILTRATE CONSOLIDATION WITHIN THE RIGHT LOWER LOBE ALTHOUGH UNDERLYING SOFT TISSUE MASS IS NOT EXCLUDED WITH TRACE PLEURAL EFFUSION. WILL NEED FURTHER EVALUATION FOLLOW-UP TO RESOLUTION. 4. THERE IS SMALL AMOUNT OF FREE FLUID AT THE INFERIOR ASPECT OF THE RIGHT LOBE OF LIVER AND EXTENDING TO THE RIGHT PARACOLIC GUTTER.           All CT scans at this facility use dose modulation, iterative reconstruction, and/or weight based dosing when appropriate to reduce radiation dose to as low as reasonably achievable.             ASSESSMENT:  Patient Active Problem List   Diagnosis    Sepsis ([address]         PLAN:    Sepsis with pyelonephritis  ESBL Klebsiella pneumonia    On meropenem plan for 3 weeks of therapy

## 2020-08-01 NOTE — FLOWSHEET NOTE
Patient is resting quietly in bed,he smiled when I introduced myself to him and the importance of another IV for his medications. he agreed for additional iv line,inserted a no. 22 Gauge angiocatheter into his right wrist on one attempt,he tolerated the procedure well.     0030:He is watching the tv,no complain of pain    0510 patient is sleeping,no distress

## 2020-08-01 NOTE — FLOWSHEET NOTE
Called lab to verify that they received pts  covid test early this am. They stated that it was prepped and being sent out.

## 2020-08-01 NOTE — PROGRESS NOTES
Wily Chong is a 44 y.o. male patient.  Pt was seen and evaluated, no overnight events    Current Facility-Administered Medications   Medication Dose Route Frequency Provider Last Rate Last Dose    meropenem (MERREM) 1 g in sodium chloride 0.9 % 100 mL IVPB (mini-bag)  1 g Intravenous Q8H Lucero Garcia  mL/hr at 08/01/20 0935 1 g at 08/01/20 0935    0.9 % sodium chloride infusion   Intravenous Continuous Jann Graham MD 75 mL/hr at 08/01/20 0013      baclofen (LIORESAL) tablet 10 mg  10 mg Oral QAM Porsha Mota MD   10 mg at 08/01/20 9939    bisacodyl (DULCOLAX) suppository 10 mg  10 mg Rectal Daily Porsha Lee MD        divalproex (DEPAKOTE ER) extended release tablet 750 mg  750 mg Oral BID Porsha Mota MD   750 mg at 08/01/20 3796    divalproex (DEPAKOTE ER) extended release tablet 500 mg  500 mg Oral Nightly Porsha Mota MD   500 mg at 07/31/20 2153    gabapentin (NEURONTIN) capsule 100 mg  100 mg Oral TID Porsha Mota MD   100 mg at 08/01/20 6529    lamoTRIgine (LAMICTAL) tablet 200 mg  200 mg Oral BID Porsha Mota MD   200 mg at 08/01/20 2432    levothyroxine (SYNTHROID) tablet 50 mcg  50 mcg Oral Daily Porsha Mota MD   50 mcg at 08/01/20 1720    magnesium hydroxide (MILK OF MAGNESIA) 400 MG/5ML suspension 30 mL  30 mL Oral Daily PRN Porsha Mota MD        polyethylene glycol (GLYCOLAX) packet 17 g  17 g Oral Daily Porsha Mota MD        propranolol (INDERAL LA) extended release capsule 60 mg  60 mg Oral Daily Porsha Lee MD   60 mg at 08/01/20 0936    sodium chloride flush 0.9 % injection 10 mL  10 mL Intravenous 2 times per day Porsha Lee MD   10 mL at 08/01/20 0936    sodium chloride flush 0.9 % injection 10 mL  10 mL Intravenous PRN Porsha Mota MD   10 mL at 07/31/20 1817    acetaminophen (TYLENOL) tablet 650 mg  650 mg Oral Q6H PRN Porsha Mota MD        Or    acetaminophen (TYLENOL) suppository 650 mg  650 mg Rectal Q6H PRN MD Noel Amosome Filter

## 2020-08-01 NOTE — PROGRESS NOTES
Pt Name: Dana Gallagher  Medical Record Number: [id number]  Date of Birth 1970   Admit date 2020  3:07 PM  Today's Date: 2020     ASSESSMENT  1. Hospital day # 2  2. UTI with septicemia  3. No evidence of cholecystitis    PLAN  1. As per hospitalist and infectious disease  2. No surgery needed at this time  3.:  Evaluation per GI    SUBJECTIVE  Chief complaint: None  Afebrile, vital signs are stable. He denies any nausea or vomiting. He is tolerating a DIET DENTAL SOFT; Safety Tray; Safety Tray (Disposables). Patient is on meropenem per urine and blood cultures. has a past medical history of CKD (chronic kidney disease), Diabetes mellitus ([address] Has a tremor, Hyperlipidemia, Hypokalemia, Intellectual disability, Kidney stone, Lennox-Gastaut syndrome ([address] Paralytic ileus ([facility] [address] Thyroid disease, and Venous thrombosis and embolism. CURRENT MEDS  Scheduled Meds:   meropenem  1 g Intravenous Q8H    baclofen  10 mg Oral QAM    bisacodyl  10 mg Rectal Daily    divalproex  750 mg Oral BID    divalproex  500 mg Oral Nightly    gabapentin  100 mg Oral TID    lamoTRIgine  200 mg Oral BID    levothyroxine  50 mcg Oral Daily    polyethylene glycol  17 g Oral Daily    propranolol  60 mg Oral Daily    sodium chloride flush  10 mL Intravenous 2 times per day    enoxaparin  40 mg Subcutaneous Daily     Continuous Infusions:   sodium chloride 75 mL/hr at 20 1634     PRN Meds:.magnesium hydroxide, sodium chloride flush, acetaminophen **OR** acetaminophen    OBJECTIVE  CURRENT VITALS:  height is 6' (1.829 m) and weight is 258 lb 4.8 oz (117.2 kg). His axillary temperature is 97.6 °F (36.4 °C). His blood pressure is 118/58 (abnormal) and his pulse is 78. His respiration is 16 and oxygen saturation is 100%.    Temperature Range (24h):Temp: 97.6 °F (36.4 °C) Temp  Av.9 °F (36.6 °C)  Min: 97.3 °F (36.3 °C)  Max: 98.7 °F (37.1 °C)  BP Range (67H): Systolic (52RNH), FOQ:591 , Min:111 , GPM:924     Diastolic (14GJG), SDM:49, Min:53, Max:58    Pulse Range (24h): Pulse  Av.3  Min: 78  Max: 84  Respiration Range (24h): Resp  Av.3  Min: 16  Max: 18    GENERAL: alert, no distress  LUNGS: clear to ausculation, without wheezes, rales or rhonci  HEART: normal rate and regular rhythm  ABDOMEN: soft, non-tender, non-distended, bowel sounds present in all 4 quadrants and no guarding or peritoneal signs  EXTERMITY: no cyanosis, clubbing or edema  In: 1592 [P. O.:60; I.V.:1532]  Out: 1900 [Urine:1900]  Date 20 - 20 2359   Shift 4061-0925 5730-1794 5080-0297 24 Hour Total   INTAKE   P.O.(mL/kg/hr) 0(0) 0(0) 60 60   I. V.(mL/kg) 632(5.4)  900(7.7) 1532(13.1)   Shift Total(mL/kg) 632(5.4) 0(0) 960(8.2) 1592(13.6)   OUTPUT   Urine(mL/kg/hr) 1000(1.1)  900 1900   Shift Total(mL/kg) 1000(8.5)  900(7.7) 1900(16.2)   Weight (kg) 117.2 117.2 117.2 117.2       LABS  Recent Labs     20  1545 20  1611 20  0615 20  1620 20  0607   WBC 12.2*  --  10.7  --  8.4   HGB 12.2*  --  9.3*  --  8.6*   HCT 37.7*  --  28.5*  --  25.9*     --  192  --  185     --  144  --  143   K 3.0*  --  2.7* 3.5 3.1*     --  110*  --  110*   CO2 26  --  21  --  21   BUN 12  --  15  --  14   CREATININE 0.87 0.9 0.92  --  0.77   MG  --   --  1.3*  --  2.2   CALCIUM 9.3  --  8.2*  --  8.0*      No results for input(s): PTT, INR in the last 72 hours. Invalid input(s): PT  Recent Labs     20  1545 20  1745   AST 17  --    ALT 8  --    BILITOT 0.5  --    LIPASE  --  25   LACTA  --  1.6       RADIOLOGY  Ct Abdomen Pelvis W Iv Contrast Additional Contrast? None    Result Date: 2020  Examination: CT ABDOMEN PELVIS W IV CONTRAST Indication:   distention, concerns for obstruction Technique: Multiple serial axial images was performed through the abdomen and pelvis utilizing 100cc of Optiray 370. Images were reconstructed in the axial and coronal and sagittal planes. POINT, NARROWING WITH  MURAL THICKENING BEGINNING AT THE DISTAL SIGMOID AND EXTENDING TO THE ANUS WITH SURROUNDING PERISIGMOID AND PERIRECTAL PERIANAL INFLAMMATORY STRANDING. FINDINGS ARE MORE SUGGESTIVE OF A MECHANICAL ILEUS DUE TO THE NARROWING. FINDINGS COULD REPRESENT A DIFFUSE UNDERLYING INFLAMMATORY INFECTIOUS ETIOLOGY. FURTHER EVALUATION IS WARRANTED. Gail [place] 2. THE GALLBLADDER IS DISTENDED AND THERE IS INCREASED ATTENUATION LAYERING DEPENDENTLY SUGGESTING GALLBLADDER SLUDGE OR STONES. CORRELATE CLINICALLY. CONSIDER FOLLOW-UP ULTRASOUND TO FURTHER EVALUATE 2. RIGHT-SIDED DOUBLE-J STENT WITH ASSOCIATED RIGHT-SIDED CHOLELITHIASIS AS DESCRIBED ABOVE. SMALL AMOUNT OF AIR WITHIN THE COLLECTING SYSTEM WHICH MAY BE RELATED TO INSTRUMENTATION. CORRELATE WITH URINALYSIS. 3. AREA OF FOCAL INFILTRATE CONSOLIDATION WITHIN THE RIGHT LOWER LOBE ALTHOUGH UNDERLYING SOFT TISSUE MASS IS NOT EXCLUDED WITH TRACE PLEURAL EFFUSION. WILL NEED FURTHER EVALUATION FOLLOW-UP TO RESOLUTION. 4. THERE IS SMALL AMOUNT OF FREE FLUID AT THE INFERIOR ASPECT OF THE RIGHT LOBE OF LIVER AND EXTENDING TO THE RIGHT PARACOLIC GUTTER. All CT scans at this facility use dose modulation, iterative reconstruction, and/or weight based dosing when appropriate to reduce radiation dose to as low as reasonably achievable. Xr Chest Portable    Result Date: 7/31/2020  XR CHEST PORTABLE : 7/30/2020 5:45 PM CLINICAL HISTORY:  fever . COMPARISON: CT abdomen and pelvis from earlier 7/30/2020. TECHNIQUE: Two portable upright AP radiographs of the chest were obtained. FINDINGS: A poor inspiratory volume is present with mild to moderate right basilar infiltrate/consolidation. Bronchopneumonia should be excluded clinically. The heart is mildly enlarged, exaggerated by technique. Probable brain stimulator generator overlies the left hemithorax. There is no significant pleural effusion, gross vascular congestion, pneumothorax, or displaced fractures identified.      POOR INSPIRATION WITH MILD TO MODERATE RIGHT BASILAR INFILTRATE/CONSOLIDATION. WITHOUT PRIOR STUDIES FOR COMPARISON, ATELECTASIS AND/OR SCARRING OR POSSIBLE. BRONCHOPNEUMONIA SHOULD BE EXCLUDED CLINICALLY. Us Abdomen Limited    Result Date: 8/1/2020  EXAMINATION: US ABDOMEN LIMITED CLINICAL HISTORY: ABNORMAL CT SCAN COMPARISONS: CT ABDOMEN PELVIS, JULY 30, 2020 FINDINGS: Liver normal in size, shape, and echogenicity. No intrahepatic ductal location. Common duct measures 3.2 mm. Color flow without anomaly. Gallbladder distended and filled. Punctate submillimeter echogenic foci identified within fluid-filled gallbladder. No para cholecystic fluid. No gallbladder wall thickening. Pancreatic head and body normal in size, shape, and echogenicity. Pancreatic tail obscured by overlying bowel gas. SLUDGE-FILLED GALLBLADDER POSSIBLY CONTAINING 7 MM CALCULI. NO SONOGRAPHIC EVIDENCE OF CHOLECYSTITIS.     Electronically signed by Randal Frazier MD on 8/1/2020 at 7:08 PM

## 2020-08-01 NOTE — FLOWSHEET NOTE
Updated pt's mom and let her speak with pt on the phone. Fed patient a chocolate pudding and pop which is all her would eat.  Electronically signed by Natalie Siu RN on 8/1/2020 at 5:00 PM

## 2020-08-01 NOTE — PROGRESS NOTES
Physician Progress Note      Ayaan Pena  [facility] #:                  [id number]  :                       1970  ADMIT DATE:       2020 3:07 PM  [address] [place] DATE:  RESPONDING  PROVIDER #:        Alison Garcia MD          QUERY TEXT:    Patient admitted with sepsis. Noted: UTI and repeated catheterization and   recent urological procedure with Gram negative darryl Urine Culture & Klebsiella   pneumoniae Blood Culture. Please document in progress notes and discharge   summary the etiology of sepsis:  Sepsis due to***::Sepsis due to***(Please specify). ]]    The medical record reflects the following:  Risk Factors: repeated catheterization and recent urological procedure  MRDD    epilepsy  Clinical Indicators:  Lab: Klebsiella pneumoniae Blood Culture  Gram negative darryl Urine Culture   >100,000 CFU/ml  H&P: UA and urine reflex are highly concerning for urinary tract infection and   patient received Zosyn. Sepsis: As manifested with high-grade fever of 103,   tachycardia and leukocytosis on presentation along with elevated procalcitonin   of 1.68 mostly secondary to urinary tract infection due to repeated   catheterization and recent urological procedure. UTI  Follow-up urine   cultures and blood cultures.  Dr. Armando Matthews:  sepsis POA most likely urine vs gallbladder   Dr. Martinez Bryan: Sepsis unclear etiology  Treatment: IV Zosyn  IV Vancomycin  urine/blood cultures  Options provided:  -- Sepsis due to CAUTI  -- Sepsis due to Klebsiella pneumoniae per blood culture  -- Other - I will add my own diagnosis  -- Disagree - Clinically unable to determine / Unknown  -- Refer to Clinical Documentation Reviewer    PROVIDER RESPONSE TEXT:    This patient has sepsis due to CAUTI.     Query created by: Ant Rizo on 2020 1:30 PM      Electronically signed by:  Alison Garcia MD 2020 2:08 PM

## 2020-08-02 LAB
ANION GAP SERPL CALCULATED.3IONS-SCNC: 13 MEQ/L (ref 9–15)
BASOPHILS ABSOLUTE: 0 K/UL (ref 0–0.2)
BASOPHILS RELATIVE PERCENT: 0.4 %
BLOOD CULTURE, ROUTINE: ABNORMAL
BUN BLDV-MCNC: 10 MG/DL (ref 6–20)
CALCIUM SERPL-MCNC: 8.2 MG/DL (ref 8.5–9.9)
CHLORIDE BLD-SCNC: 109 MEQ/L (ref 95–107)
CO2: 23 MEQ/L (ref 20–31)
CREAT SERPL-MCNC: 0.72 MG/DL (ref 0.7–1.2)
EOSINOPHILS ABSOLUTE: 0.1 K/UL (ref 0–0.7)
EOSINOPHILS RELATIVE PERCENT: 1.6 %
FERRITIN: 541 NG/ML (ref 30–400)
FOLATE: 9.5 NG/ML (ref 7.3–26.1)
GFR AFRICAN AMERICAN: >60
GFR NON-AFRICAN AMERICAN: >60
GLUCOSE BLD-MCNC: 97 MG/DL (ref 70–99)
HCT VFR BLD CALC: 24.6 % (ref 42–52)
HEMOGLOBIN: 7.9 G/DL (ref 14–18)
IRON SATURATION: 21 % (ref 11–46)
IRON: 25 UG/DL (ref 59–158)
LYMPHOCYTES ABSOLUTE: 1.5 K/UL (ref 1–4.8)
LYMPHOCYTES RELATIVE PERCENT: 23.5 %
MAGNESIUM: 2.1 MG/DL (ref 1.7–2.4)
MCH RBC QN AUTO: 27.7 PG (ref 27–31.3)
MCHC RBC AUTO-ENTMCNC: 32.3 % (ref 33–37)
MCV RBC AUTO: 86 FL (ref 80–100)
MONOCYTES ABSOLUTE: 0.6 K/UL (ref 0.2–0.8)
MONOCYTES RELATIVE PERCENT: 10 %
NEUTROPHILS ABSOLUTE: 4 K/UL (ref 1.4–6.5)
NEUTROPHILS RELATIVE PERCENT: 64.5 %
ORGANISM: ABNORMAL
ORGANISM: ABNORMAL
PDW BLD-RTO: 18 % (ref 11.5–14.5)
PLATELET # BLD: 186 K/UL (ref 130–400)
POTASSIUM REFLEX MAGNESIUM: 2.6 MEQ/L (ref 3.4–4.9)
POTASSIUM SERPL-SCNC: 3.3 MEQ/L (ref 3.4–4.9)
RBC # BLD: 2.86 M/UL (ref 4.7–6.1)
SODIUM BLD-SCNC: 145 MEQ/L (ref 135–144)
TOTAL IRON BINDING CAPACITY: 117 UG/DL (ref 178–450)
VITAMIN B-12: 596 PG/ML (ref 232–1245)
WBC # BLD: 6.2 K/UL (ref 4.8–10.8)

## 2020-08-02 PROCEDURE — 83540 ASSAY OF IRON: CPT

## 2020-08-02 PROCEDURE — 82728 ASSAY OF FERRITIN: CPT

## 2020-08-02 PROCEDURE — 6370000000 HC RX 637 (ALT 250 FOR IP): Performed by: INTERNAL MEDICINE

## 2020-08-02 PROCEDURE — 80048 BASIC METABOLIC PNL TOTAL CA: CPT

## 2020-08-02 PROCEDURE — 84132 ASSAY OF SERUM POTASSIUM: CPT

## 2020-08-02 PROCEDURE — 2060000000 HC ICU INTERMEDIATE R&B

## 2020-08-02 PROCEDURE — 6360000002 HC RX W HCPCS: Performed by: INTERNAL MEDICINE

## 2020-08-02 PROCEDURE — 82746 ASSAY OF FOLIC ACID SERUM: CPT

## 2020-08-02 PROCEDURE — 2580000003 HC RX 258: Performed by: INTERNAL MEDICINE

## 2020-08-02 PROCEDURE — 83550 IRON BINDING TEST: CPT

## 2020-08-02 PROCEDURE — 83735 ASSAY OF MAGNESIUM: CPT

## 2020-08-02 PROCEDURE — 85025 COMPLETE CBC W/AUTO DIFF WBC: CPT

## 2020-08-02 PROCEDURE — 82607 VITAMIN B-12: CPT

## 2020-08-02 PROCEDURE — 36415 COLL VENOUS BLD VENIPUNCTURE: CPT

## 2020-08-02 PROCEDURE — 2500000003 HC RX 250 WO HCPCS: Performed by: INTERNAL MEDICINE

## 2020-08-02 RX ORDER — DIVALPROEX SODIUM 250 MG/1
750 TABLET, EXTENDED RELEASE ORAL 2 TIMES DAILY
Status: DISCONTINUED | OUTPATIENT
Start: 2020-08-02 | End: 2020-08-04 | Stop reason: HOSPADM

## 2020-08-02 RX ORDER — POTASSIUM CHLORIDE 7.45 MG/ML
10 INJECTION INTRAVENOUS
Status: COMPLETED | OUTPATIENT
Start: 2020-08-02 | End: 2020-08-02

## 2020-08-02 RX ORDER — POTASSIUM CHLORIDE 7.45 MG/ML
10 INJECTION INTRAVENOUS
Status: DISCONTINUED | OUTPATIENT
Start: 2020-08-02 | End: 2020-08-02

## 2020-08-02 RX ORDER — DEXTROSE, SODIUM CHLORIDE, AND POTASSIUM CHLORIDE 5; .45; .15 G/100ML; G/100ML; G/100ML
INJECTION INTRAVENOUS CONTINUOUS
Status: DISCONTINUED | OUTPATIENT
Start: 2020-08-02 | End: 2020-08-03

## 2020-08-02 RX ORDER — POTASSIUM CHLORIDE 20 MEQ/1
40 TABLET, EXTENDED RELEASE ORAL ONCE
Status: COMPLETED | OUTPATIENT
Start: 2020-08-02 | End: 2020-08-02

## 2020-08-02 RX ORDER — SODIUM CHLORIDE AND POTASSIUM CHLORIDE .9; .15 G/100ML; G/100ML
SOLUTION INTRAVENOUS CONTINUOUS
Status: DISCONTINUED | OUTPATIENT
Start: 2020-08-02 | End: 2020-08-02

## 2020-08-02 RX ORDER — DIVALPROEX SODIUM 250 MG/1
500 TABLET, EXTENDED RELEASE ORAL NIGHTLY
Status: DISCONTINUED | OUTPATIENT
Start: 2020-08-02 | End: 2020-08-04 | Stop reason: HOSPADM

## 2020-08-02 RX ADMIN — DIVALPROEX SODIUM 750 MG: 500 TABLET, EXTENDED RELEASE ORAL at 08:55

## 2020-08-02 RX ADMIN — ENOXAPARIN SODIUM 40 MG: 40 INJECTION SUBCUTANEOUS at 08:54

## 2020-08-02 RX ADMIN — PROPRANOLOL HYDROCHLORIDE 60 MG: 60 CAPSULE, EXTENDED RELEASE ORAL at 09:04

## 2020-08-02 RX ADMIN — POTASSIUM CHLORIDE 10 MEQ: 7.46 INJECTION, SOLUTION INTRAVENOUS at 17:34

## 2020-08-02 RX ADMIN — POTASSIUM CHLORIDE 10 MEQ: 7.46 INJECTION, SOLUTION INTRAVENOUS at 15:21

## 2020-08-02 RX ADMIN — POTASSIUM CHLORIDE 10 MEQ: 7.46 INJECTION, SOLUTION INTRAVENOUS at 12:08

## 2020-08-02 RX ADMIN — GABAPENTIN 100 MG: 100 CAPSULE ORAL at 08:56

## 2020-08-02 RX ADMIN — GABAPENTIN 100 MG: 100 CAPSULE ORAL at 13:56

## 2020-08-02 RX ADMIN — DIVALPROEX SODIUM 750 MG: 250 TABLET, EXTENDED RELEASE ORAL at 13:56

## 2020-08-02 RX ADMIN — Medication 10 ML: at 21:42

## 2020-08-02 RX ADMIN — Medication 10 ML: at 09:05

## 2020-08-02 RX ADMIN — MEROPENEM 1 G: 1 INJECTION, POWDER, FOR SOLUTION INTRAVENOUS at 02:03

## 2020-08-02 RX ADMIN — POTASSIUM CHLORIDE 10 MEQ: 7.46 INJECTION, SOLUTION INTRAVENOUS at 16:26

## 2020-08-02 RX ADMIN — LEVOTHYROXINE SODIUM 50 MCG: 0.05 TABLET ORAL at 09:01

## 2020-08-02 RX ADMIN — GABAPENTIN 100 MG: 100 CAPSULE ORAL at 21:37

## 2020-08-02 RX ADMIN — POTASSIUM CHLORIDE 40 MEQ: 20 TABLET, EXTENDED RELEASE ORAL at 08:54

## 2020-08-02 RX ADMIN — LAMOTRIGINE 200 MG: 200 TABLET ORAL at 21:37

## 2020-08-02 RX ADMIN — SODIUM CHLORIDE: 9 INJECTION, SOLUTION INTRAVENOUS at 08:57

## 2020-08-02 RX ADMIN — POTASSIUM CHLORIDE, DEXTROSE MONOHYDRATE AND SODIUM CHLORIDE: 150; 5; 450 INJECTION, SOLUTION INTRAVENOUS at 15:24

## 2020-08-02 RX ADMIN — MEROPENEM 1 G: 1 INJECTION, POWDER, FOR SOLUTION INTRAVENOUS at 08:54

## 2020-08-02 RX ADMIN — BACLOFEN 10 MG: 10 TABLET ORAL at 08:55

## 2020-08-02 RX ADMIN — POTASSIUM CHLORIDE 10 MEQ: 7.46 INJECTION, SOLUTION INTRAVENOUS at 10:48

## 2020-08-02 RX ADMIN — POTASSIUM CHLORIDE 10 MEQ: 7.46 INJECTION, SOLUTION INTRAVENOUS at 13:55

## 2020-08-02 RX ADMIN — LAMOTRIGINE 200 MG: 200 TABLET ORAL at 08:54

## 2020-08-02 RX ADMIN — MEROPENEM 1 G: 1 INJECTION, POWDER, FOR SOLUTION INTRAVENOUS at 18:47

## 2020-08-02 ASSESSMENT — ENCOUNTER SYMPTOMS
VOMITING: 0
SHORTNESS OF BREATH: 0
COUGH: 0
DIARRHEA: 0
NAUSEA: 0

## 2020-08-02 ASSESSMENT — PAIN SCALES - GENERAL
PAINLEVEL_OUTOF10: 0

## 2020-08-02 NOTE — FLOWSHEET NOTE
Pt fed ate 10 percent of breakfast tray   Refused to be moved  Became agitated  Refused pills that were large  Dr aware and will give K IV   K WAS 2.6    Called mother   And she spoke with him in regard to co operating with nurse  Still  Refusing laxatives and large pills

## 2020-08-02 NOTE — PROGRESS NOTES
Joy Baker is a 44 y.o. male patient.  Pt was seen and evaluated, no overnight events    Current Facility-Administered Medications   Medication Dose Route Frequency Provider Last Rate Last Dose    potassium chloride 10 mEq/100 mL IVPB (Peripheral Line)  10 mEq Intravenous Q1H Corina Canseco  mL/hr at 08/02/20 1355 10 mEq at 08/02/20 1355    divalproex (DEPAKOTE ER) extended release tablet 750 mg  750 mg Oral BID Porsha Thompson MD   750 mg at 08/02/20 1356    divalproex (DEPAKOTE ER) extended release tablet 500 mg  500 mg Oral Nightly Porsha Thompson MD        dextrose 5 % and 0.45 % NaCl with KCl 20 mEq infusion   Intravenous Continuous Corina Canseco MD        meropenem (MERREM) 1 g in sodium chloride 0.9 % 100 mL IVPB (mini-bag)  1 g Intravenous Q8H Loni Garcia MD   Stopped at 08/02/20 1048    baclofen (LIORESAL) tablet 10 mg  10 mg Oral QAM Porsha Thompson MD   10 mg at 08/02/20 0855    bisacodyl (DULCOLAX) suppository 10 mg  10 mg Rectal Daily Porsha Thompson MD        gabapentin (NEURONTIN) capsule 100 mg  100 mg Oral TID Porhsa Thompson MD   100 mg at 08/02/20 1356    lamoTRIgine (LAMICTAL) tablet 200 mg  200 mg Oral BID Porsha Thompson MD   200 mg at 08/02/20 0854    levothyroxine (SYNTHROID) tablet 50 mcg  50 mcg Oral Daily Porsha Thompson MD   50 mcg at 08/02/20 0901    magnesium hydroxide (MILK OF MAGNESIA) 400 MG/5ML suspension 30 mL  30 mL Oral Daily PRN Porsha Thompson MD        polyethylene glycol (GLYCOLAX) packet 17 g  17 g Oral Daily Porsha Mota MD        propranolol (INDERAL LA) extended release capsule 60 mg  60 mg Oral Daily Porsha Thompson MD   60 mg at 08/02/20 0904    sodium chloride flush 0.9 % injection 10 mL  10 mL Intravenous 2 times per day Porsha Thompson MD   10 mL at 08/02/20 0905    sodium chloride flush 0.9 % injection 10 mL  10 mL Intravenous PRN Porsha Mota MD   10 mL at 07/31/20 1817    acetaminophen (TYLENOL) tablet 650 mg  650 mg Oral Q6H PRN Zahraa Ferrari MD Or    acetaminophen (TYLENOL) suppository 650 mg  650 mg Rectal Q6H PRN Porsha Mota MD        enoxaparin (LOVENOX) injection 40 mg  40 mg Subcutaneous Daily Porsha Mota MD   40 mg at 08/02/20 0854     Allergies   Allergen Reactions    Cefazolin     Celontin [Methsuximide]     Duricef [Cefadroxil]     Simvastatin     Vicodin [Hydrocodone-Acetaminophen]      Active Problems:    Sepsis ([address]  Resolved Problems:    * No resolved hospital problems. *    Blood pressure 110/76, pulse 69, temperature 98.4 °F (36.9 °C), resp. rate 16, height 6' (1.829 m), weight 257 lb (116.6 kg), SpO2 95 %. Subjective:  Symptoms:  He reports malaise and weakness. No shortness of breath, cough, chest pain, headache, chest pressure, anorexia, diarrhea or anxiety. Diet:  No nausea or vomiting. Objective:  General Appearance:  Ill-appearing. Vital signs: (most recent): Blood pressure 110/76, pulse 69, temperature 98.4 °F (36.9 °C), resp. rate 16, height 6' (1.829 m), weight 257 lb (116.6 kg), SpO2 95 %. HEENT: Normal HEENT exam.    Lungs:  Normal effort. Heart: Normal rate. Regular rhythm. S1 normal and S2 normal.    Abdomen: Abdomen is soft. Bowel sounds are normal.   There is no mass. Pulses: Distal pulses are intact. Neurological: Patient is alert. Pupils:  Pupils are equal, round, and reactive to light. Skin:  Warm and dry.       Assessment & Plan    1) sepsis POA most liley urine vs gallblader  FU surery  FU abd u/s noted  Resumed diet  C/w abx    2) hypokalemia  replace  replced  3) MRDD   Supportive measures  4) COVID rule out  repeat covid testing today  5) hypernatremia  Change IVF to hypotonix  6) anemia  Anemia benoit   Mother refused colonoscopy as per GI  Jada Moraes MD  8/2/2020

## 2020-08-02 NOTE — PROGRESS NOTES
[facility]   Facility/Department: [id number] [facility]  Speech Language Pathology    R[employer]  1970  [id number]    Date: 8/2/2020      Speech Therapy attempted to see Meng Lawton on this date for a/an:    Clinical Bedside Swallow Evaluation    Pt was unable to be seen due to: Other: SLP spoke with Loismiguel Barba prior to attempt. Pt refusing all patient care this AM. RN notified SLP they were going to reposition pt and SLP could come with RN to attempt BSE. Bear Wiggins and two other nurses entered room prior to SLP. Pt screaming \"dont' touch me\" and being verbally aggressive with nursing team. Pt not appropriate for evaluation at this time.     Electronically signed by SAMANTHA Figueroa on 8/2/20 at 11:56 AM EDT

## 2020-08-03 ENCOUNTER — APPOINTMENT (OUTPATIENT)
Dept: INTERVENTIONAL RADIOLOGY/VASCULAR | Age: 50
DRG: 698 | End: 2020-08-03
Payer: MEDICARE

## 2020-08-03 VITALS
BODY MASS INDEX: 34.81 KG/M2 | WEIGHT: 257 LBS | DIASTOLIC BLOOD PRESSURE: 75 MMHG | TEMPERATURE: 98.1 F | SYSTOLIC BLOOD PRESSURE: 150 MMHG | OXYGEN SATURATION: 100 % | RESPIRATION RATE: 16 BRPM | HEART RATE: 73 BPM | HEIGHT: 72 IN

## 2020-08-03 LAB
ANION GAP SERPL CALCULATED.3IONS-SCNC: 10 MEQ/L (ref 9–15)
ANION GAP SERPL CALCULATED.3IONS-SCNC: 9 MEQ/L (ref 9–15)
BASOPHILS ABSOLUTE: 0 K/UL (ref 0–0.2)
BASOPHILS RELATIVE PERCENT: 0.8 %
BUN BLDV-MCNC: 5 MG/DL (ref 6–20)
BUN BLDV-MCNC: 6 MG/DL (ref 6–20)
CALCIUM SERPL-MCNC: 8.1 MG/DL (ref 8.5–9.9)
CALCIUM SERPL-MCNC: 8.3 MG/DL (ref 8.5–9.9)
CHLORIDE BLD-SCNC: 105 MEQ/L (ref 95–107)
CHLORIDE BLD-SCNC: 109 MEQ/L (ref 95–107)
CO2: 23 MEQ/L (ref 20–31)
CO2: 25 MEQ/L (ref 20–31)
CREAT SERPL-MCNC: 0.67 MG/DL (ref 0.7–1.2)
CREAT SERPL-MCNC: 0.71 MG/DL (ref 0.7–1.2)
EOSINOPHILS ABSOLUTE: 0.2 K/UL (ref 0–0.7)
EOSINOPHILS RELATIVE PERCENT: 3.6 %
GFR AFRICAN AMERICAN: >60
GFR AFRICAN AMERICAN: >60
GFR NON-AFRICAN AMERICAN: >60
GFR NON-AFRICAN AMERICAN: >60
GLUCOSE BLD-MCNC: 109 MG/DL (ref 70–99)
GLUCOSE BLD-MCNC: 91 MG/DL (ref 70–99)
HCT VFR BLD CALC: 25.2 % (ref 42–52)
HEMOGLOBIN: 8.2 G/DL (ref 14–18)
LYMPHOCYTES ABSOLUTE: 1.5 K/UL (ref 1–4.8)
LYMPHOCYTES RELATIVE PERCENT: 33.9 %
MAGNESIUM: 1.9 MG/DL (ref 1.7–2.4)
MCH RBC QN AUTO: 27.7 PG (ref 27–31.3)
MCHC RBC AUTO-ENTMCNC: 32.5 % (ref 33–37)
MCV RBC AUTO: 85.3 FL (ref 80–100)
MONOCYTES ABSOLUTE: 0.5 K/UL (ref 0.2–0.8)
MONOCYTES RELATIVE PERCENT: 10.2 %
NEUTROPHILS ABSOLUTE: 2.3 K/UL (ref 1.4–6.5)
NEUTROPHILS RELATIVE PERCENT: 51.5 %
PDW BLD-RTO: 17.8 % (ref 11.5–14.5)
PLATELET # BLD: 192 K/UL (ref 130–400)
POTASSIUM REFLEX MAGNESIUM: 2.7 MEQ/L (ref 3.4–4.9)
POTASSIUM SERPL-SCNC: 3.4 MEQ/L (ref 3.4–4.9)
RBC # BLD: 2.95 M/UL (ref 4.7–6.1)
SARS-COV-2, NAAT: NOT DETECTED
SODIUM BLD-SCNC: 139 MEQ/L (ref 135–144)
SODIUM BLD-SCNC: 142 MEQ/L (ref 135–144)
WBC # BLD: 4.6 K/UL (ref 4.8–10.8)

## 2020-08-03 PROCEDURE — 2580000003 HC RX 258: Performed by: INTERNAL MEDICINE

## 2020-08-03 PROCEDURE — 80048 BASIC METABOLIC PNL TOTAL CA: CPT

## 2020-08-03 PROCEDURE — 6370000000 HC RX 637 (ALT 250 FOR IP): Performed by: INTERNAL MEDICINE

## 2020-08-03 PROCEDURE — 02HV33Z INSERTION OF INFUSION DEVICE INTO SUPERIOR VENA CAVA, PERCUTANEOUS APPROACH: ICD-10-PCS | Performed by: INTERNAL MEDICINE

## 2020-08-03 PROCEDURE — 92610 EVALUATE SWALLOWING FUNCTION: CPT

## 2020-08-03 PROCEDURE — 6360000002 HC RX W HCPCS: Performed by: INTERNAL MEDICINE

## 2020-08-03 PROCEDURE — U0002 COVID-19 LAB TEST NON-CDC: HCPCS

## 2020-08-03 PROCEDURE — 36573 INSJ PICC RS&I 5 YR+: CPT | Performed by: RADIOLOGY

## 2020-08-03 PROCEDURE — 2709999900 IR PICC WO SQ PORT/PUMP > 5 YEARS

## 2020-08-03 PROCEDURE — 2060000000 HC ICU INTERMEDIATE R&B

## 2020-08-03 PROCEDURE — 99232 SBSQ HOSP IP/OBS MODERATE 35: CPT | Performed by: INTERNAL MEDICINE

## 2020-08-03 PROCEDURE — 83735 ASSAY OF MAGNESIUM: CPT

## 2020-08-03 PROCEDURE — 2500000003 HC RX 250 WO HCPCS: Performed by: INTERNAL MEDICINE

## 2020-08-03 PROCEDURE — 85025 COMPLETE CBC W/AUTO DIFF WBC: CPT

## 2020-08-03 PROCEDURE — 36415 COLL VENOUS BLD VENIPUNCTURE: CPT

## 2020-08-03 RX ORDER — SODIUM CHLORIDE 0.9 % (FLUSH) 0.9 %
10 SYRINGE (ML) INJECTION PRN
Status: DISCONTINUED | OUTPATIENT
Start: 2020-08-03 | End: 2020-08-04 | Stop reason: HOSPADM

## 2020-08-03 RX ORDER — LORAZEPAM 2 MG/ML
1 INJECTION INTRAMUSCULAR ONCE
Status: COMPLETED | OUTPATIENT
Start: 2020-08-03 | End: 2020-08-03

## 2020-08-03 RX ORDER — SODIUM CHLORIDE 9 MG/ML
250 INJECTION, SOLUTION INTRAVENOUS ONCE
Status: COMPLETED | OUTPATIENT
Start: 2020-08-03 | End: 2020-08-03

## 2020-08-03 RX ORDER — POTASSIUM CHLORIDE 7.45 MG/ML
10 INJECTION INTRAVENOUS
Status: COMPLETED | OUTPATIENT
Start: 2020-08-03 | End: 2020-08-03

## 2020-08-03 RX ORDER — LIDOCAINE HYDROCHLORIDE 20 MG/ML
5 INJECTION, SOLUTION INFILTRATION; PERINEURAL ONCE
Status: COMPLETED | OUTPATIENT
Start: 2020-08-03 | End: 2020-08-03

## 2020-08-03 RX ORDER — POTASSIUM CHLORIDE 20 MEQ/1
40 TABLET, EXTENDED RELEASE ORAL
Status: DISCONTINUED | OUTPATIENT
Start: 2020-08-03 | End: 2020-08-04 | Stop reason: HOSPADM

## 2020-08-03 RX ORDER — FERROUS SULFATE 325(65) MG
325 TABLET ORAL 2 TIMES DAILY WITH MEALS
Status: DISCONTINUED | OUTPATIENT
Start: 2020-08-03 | End: 2020-08-04 | Stop reason: HOSPADM

## 2020-08-03 RX ORDER — FERROUS SULFATE 325(65) MG
325 TABLET ORAL 2 TIMES DAILY WITH MEALS
Qty: 30 TABLET | Refills: 3 | Status: ON HOLD | OUTPATIENT
Start: 2020-08-03

## 2020-08-03 RX ORDER — SODIUM CHLORIDE 0.9 % (FLUSH) 0.9 %
10 SYRINGE (ML) INJECTION EVERY 12 HOURS SCHEDULED
Status: DISCONTINUED | OUTPATIENT
Start: 2020-08-03 | End: 2020-08-04 | Stop reason: HOSPADM

## 2020-08-03 RX ADMIN — POTASSIUM CHLORIDE 10 MEQ: 7.46 INJECTION, SOLUTION INTRAVENOUS at 08:43

## 2020-08-03 RX ADMIN — POTASSIUM CHLORIDE 10 MEQ: 7.46 INJECTION, SOLUTION INTRAVENOUS at 14:51

## 2020-08-03 RX ADMIN — POTASSIUM CHLORIDE 10 MEQ: 7.46 INJECTION, SOLUTION INTRAVENOUS at 11:49

## 2020-08-03 RX ADMIN — MEROPENEM 1 G: 1 INJECTION, POWDER, FOR SOLUTION INTRAVENOUS at 10:49

## 2020-08-03 RX ADMIN — MEROPENEM 1 G: 1 INJECTION, POWDER, FOR SOLUTION INTRAVENOUS at 01:58

## 2020-08-03 RX ADMIN — ENOXAPARIN SODIUM 40 MG: 40 INJECTION SUBCUTANEOUS at 08:44

## 2020-08-03 RX ADMIN — Medication 10 ML: at 08:44

## 2020-08-03 RX ADMIN — POTASSIUM CHLORIDE 10 MEQ: 7.46 INJECTION, SOLUTION INTRAVENOUS at 13:49

## 2020-08-03 RX ADMIN — POTASSIUM CHLORIDE 10 MEQ: 7.46 INJECTION, SOLUTION INTRAVENOUS at 09:49

## 2020-08-03 RX ADMIN — POTASSIUM CHLORIDE 10 MEQ: 7.46 INJECTION, SOLUTION INTRAVENOUS at 10:49

## 2020-08-03 RX ADMIN — LORAZEPAM 1 MG: 2 INJECTION, SOLUTION INTRAMUSCULAR; INTRAVENOUS at 14:51

## 2020-08-03 RX ADMIN — FERROUS SULFATE TAB 325 MG (65 MG ELEMENTAL FE) 325 MG: 325 (65 FE) TAB at 17:30

## 2020-08-03 RX ADMIN — Medication 10 ML: at 17:26

## 2020-08-03 RX ADMIN — SODIUM CHLORIDE 250 ML: 9 INJECTION, SOLUTION INTRAVENOUS at 15:32

## 2020-08-03 RX ADMIN — POTASSIUM CHLORIDE 10 MEQ: 7.46 INJECTION, SOLUTION INTRAVENOUS at 12:49

## 2020-08-03 RX ADMIN — MEROPENEM 1 G: 1 INJECTION, POWDER, FOR SOLUTION INTRAVENOUS at 17:25

## 2020-08-03 RX ADMIN — LIDOCAINE HYDROCHLORIDE 5 ML: 20 INJECTION, SOLUTION INFILTRATION; PERINEURAL at 15:31

## 2020-08-03 ASSESSMENT — PAIN SCALES - GENERAL
PAINLEVEL_OUTOF10: 0

## 2020-08-03 NOTE — FLOWSHEET NOTE
Report called to Catherine Oro at [facility]. Patient and mom aware of .  Electronically signed by Mirza Terrell RN on 8/3/2020 at 6:48 PM

## 2020-08-03 NOTE — CARE COORDINATION
The LSW met with the patient and his mother, Lois Romero. Lois Singhughter verbally agreed to sign the IMM and expressed an understanding of the medicare appeal process. The patient and his mother are aware the patient is a possible discharge for today. The LSW also updated Lauro Donald, admissions at [facility]. Electronically signed by JOHANN He on 8/3/20 at 12:07 PM EDT    The LSW set the discharge at 7 pm by cot (patient has MRDD) to return to [facility]. The LSW left a message for the patient's mother, Lois Romero. The LSW also notified the patient's RN and Lauro Donald, admissions at [facility].  Electronically signed by JOHANN He on 8/3/20 at 4:19 PM EDT

## 2020-08-03 NOTE — DISCHARGE SUMMARY
Physician Discharge Summary     Patient ID:  Luis Kilpatrick  [id number]  44 y.o.  1970    Admit date: 7/30/2020    Discharge date and time: 8/3/20    Admitting Physician:Dr. Julian Ma    Discharge Physician: Riki Ibrahim    Admission Diagnoses: Sepsis [facility]) [A41.9]    Discharge Diagnoses: sepsis  hypokalemia  ESBL PNA, sepsis with pylonephriis    Admission Condition: fair    Discharged Condition: fair    Indication for Admission: fever    Hospital Course: IV abx  IVF  IV potassium  Mother refused colonoscpy for abdnormal CT scanof abd and iron def anemia  ID recommend picc line and IV abx with merrem for sepsis  abd us/ s: no acute juan  Consults: ID, general surgery, GI    Significant Diagnostic Studies:   Lab Results   Component Value Date    WBC 4.6 (L) 08/03/2020    HGB 8.2 (L) 08/03/2020    HCT 25.2 (L) 08/03/2020    MCV 85.3 08/03/2020     08/03/2020     Lab Results   Component Value Date     08/03/2020    K 2.7 08/03/2020     08/03/2020    CO2 23 08/03/2020    BUN 6 08/03/2020    CREATININE 0.71 08/03/2020    GLUCOSE 109 08/03/2020    CALCIUM 8.3 08/03/2020          Treatments:   Current Facility-Administered Medications   Medication Dose Route Frequency Provider Last Rate Last Dose    potassium chloride (KLOR-CON M) extended release tablet 40 mEq  40 mEq Oral Daily with breakfast Aníbal Melara MD        ferrous sulfate (IRON 325) tablet 325 mg  325 mg Oral BID  Aníbal Melara MD        lidocaine 2 % injection 5 mL  5 mL Intradermal Once Aníbal Melara MD        sodium chloride flush 0.9 % injection 10 mL  10 mL Intravenous 2 times per day Aníbal Melara MD        sodium chloride flush 0.9 % injection 10 mL  10 mL Intravenous PRN Aníbal Melara MD        0.9 % sodium chloride infusion  250 mL Intravenous Once Aníbal Melara MD        LORazepam (ATIVAN) injection 1 mg  1 mg Intravenous Once Aníbal Melara MD        divalproex (DEPAKOTE ER) extended release tablet 750 mg  750 mg Oral BID Angelo Ocasio MD   750 mg at 08/02/20 1356    divalproex (DEPAKOTE ER) extended release tablet 500 mg  500 mg Oral Nightly Porsha Verde MD        meropenem (MERREM) 1 g in sodium chloride 0.9 % 100 mL IVPB (mini-bag)  1 g Intravenous Q8H Munoz Yaakov Garcia MD   Stopped at 08/03/20 1119    baclofen (LIORESAL) tablet 10 mg  10 mg Oral QAM Porsha Verde MD   10 mg at 08/02/20 0855    bisacodyl (DULCOLAX) suppository 10 mg  10 mg Rectal Daily Porsha Verde MD        gabapentin (NEURONTIN) capsule 100 mg  100 mg Oral TID Porsha Verde MD   100 mg at 08/02/20 2137    lamoTRIgine (LAMICTAL) tablet 200 mg  200 mg Oral BID Porsha Verde MD   200 mg at 08/02/20 2137    levothyroxine (SYNTHROID) tablet 50 mcg  50 mcg Oral Daily Porsha Verde MD   50 mcg at 08/02/20 0901    magnesium hydroxide (MILK OF MAGNESIA) 400 MG/5ML suspension 30 mL  30 mL Oral Daily PRN Porsha Verde MD        polyethylene glycol (GLYCOLAX) packet 17 g  17 g Oral Daily Porsha Mota MD        propranolol (INDERAL LA) extended release capsule 60 mg  60 mg Oral Daily Porsha Mota MD   60 mg at 08/02/20 9518    acetaminophen (TYLENOL) tablet 650 mg  650 mg Oral Q6H PRN Porsha Mota MD        Or    acetaminophen (TYLENOL) suppository 650 mg  650 mg Rectal Q6H PRN Porsha Mota MD        enoxaparin (LOVENOX) injection 40 mg  40 mg Subcutaneous Daily Porsha Verde MD   40 mg at 08/03/20 0844       Discharge Exam:  HEENT: AT/NC, PERRLA, no JVD  HEART: s1/s2 wnl w/o s3  LUNG: clear  ABD: soft, NT  EXT: no edema  SKin : no rash  Neuro:no focal deficits  Stage 2 decubitus ulcer POA      Disposition: [id number] [facility]    In process/preliminary results:  Outstanding Order Results     Date and Time Order Name Status Description    7/31/2020 2345 Covid-19 Ambulatory Preliminary     7/30/2020 1818 Culture, Blood 2 Preliminary           Patient Instructions:   Current Discharge Medication List      START taking these medications    Details   ferrous sulfate (IRON 325) 325 (65 Fe) MG tablet Take 1 tablet by mouth 2 times daily (with meals)  Qty: 30 tablet, Refills: 3         CONTINUE these medications which have NOT CHANGED    Details   baclofen (LIORESAL) 10 MG tablet Take 10 mg by mouth every morning      !! divalproex (DEPAKOTE ER) 250 MG extended release tablet Take 750 mg by mouth 2 times daily      !! divalproex (DEPAKOTE ER) 500 MG extended release tablet Take 500 mg by mouth nightly      Bisacodyl (DULCOLAX RE) Place 1 Units rectally daily as needed (constipation)      Sodium Phosphates (FLEET) 7-19 GM/118ML Place 1 enema rectally daily as needed (constipation)      gabapentin (NEURONTIN) 100 MG capsule Take 100 mg by mouth 3 times daily. lamoTRIgine (LAMICTAL) 200 MG tablet Take 200 mg by mouth 2 times daily      magnesium hydroxide (MILK OF MAGNESIA) 400 MG/5ML suspension Take 30 mLs by mouth daily as needed for Constipation      polyethylene glycol (GLYCOLAX) 17 g packet Take 17 g by mouth daily as needed for Constipation      potassium chloride (KLOR-CON M) 20 MEQ TBCR extended release tablet Take 60 mEq by mouth 2 times daily      propranolol (INDERAL LA) 60 MG extended release capsule Take 60 mg by mouth daily      levothyroxine (SYNTHROID) 50 MCG tablet Take 50 mcg by mouth Daily       ! ! - Potential duplicate medications found. Please discuss with provider.         Activity: as tolerated  Diet:regular diet  Follow-up with PCP in 1 week  Overtime on dc summary was 45 min  Signed:    8/3/2020  2:24 PM

## 2020-08-03 NOTE — PROGRESS NOTES
Spiritual Care Services     Summary of Visit:  Patient resting in bed. Patient \"just wants to go home! \" Patient is miserable! His mother is at his bedside. Mother trying to be supportive.  encouraged patient and provided a Spiritual Communion card to Mom for her to share with her son.  offered a blessing to mom and patient. Spiritual Assessment/Intervention/Outcomes:    Encounter Summary  Services provided to[de-identified] Patient and family together  Referral/Consult From[de-identified] Rounding  Support System: Parent, Latter day/lena community  Place of Islam: [employer]  Contact Latter day: No  Continue Visiting: No  Complexity of Encounter: Moderate  Length of Encounter: 15 minutes  Spiritual Assessment Completed: Yes  Routine  Type: Initial  Assessment: Anxious, Coping(frustrated)  Intervention: Explored feelings, thoughts, concerns, Nurtured hope, [place], Discussed illness/injury and it's impact, Discussed belief system/Judaism practices/lena  Outcome: Expressed gratitude, Engaged in conversation, Receptive, Less anxious, less agitated              Advance Directives (For Healthcare)  Pre-existing DNR Comfort Care/DNR Arrest/DNI Order: No  Healthcare Directive: No, patient does not have an advance directive for healthcare treatment  Information on Healthcare Directives Requested: No  Patient Requests Assistance: No           Values / Beliefs  Do you have any ethnic, cultural, sacramental, or spiritual Judaism needs you would like us to be aware of while you are in the hospital?: No    Care Plan:    No follow up necessary. Spiritual Care Services   Electronically signed by Marilee Villa on 8/3/20 at 4:05 PM EDT     To reach a  for emotional and spiritual support, place an [facility] consult request.   If a  is needed immediately, dial 0 and ask to page the on-call .

## 2020-08-03 NOTE — PROGRESS NOTES
[facility] Occupational Therapy      Date: 8/3/2020  Patient Name: Simon Agarwal        MRN: [id number]  Account: [de-identified]   : 1970  (44 y.o.)  Room: [facility]/[place] [address]    Chart reviewed, attempted OT at [phone] for eval. Patient not seen 2° to:    Pt. declined, stating: \"No!\" Pt. easily agitated and even with encouragement became more upset at therapist offering tx. Pt. also refuses breakfast despite therapist offering to set it up. Ended eval attempt. Spoke to Teresa Leblanc RN aware and present in room for attempt. Will attempt again when able.     Electronically signed by DORIS Albarado on 8/3/2020 at 9:26 AM

## 2020-08-03 NOTE — PROGRESS NOTES
[facility]   Facility/Department: [facility]  Speech Language Pathology  Clinical Bedside Swallow Evaluation    Sujey Salvador  : 1970 (52 y.o.)   MRN: [id number]  ROOM: G628/L987-83  ADMISSION DATE: 2020  PATIENT DIAGNOSIS(ES): Sepsis ([facility] [A41.9]  Chief Complaint   Patient presents with    Abnormal Lab     Hgb 9.9 and K+ 2.9 today at [facility]     Patient Active Problem List    Diagnosis Date Noted    Sepsis ([facility] 2020     Past Medical History:   Diagnosis Date    CKD (chronic kidney disease)     Diabetes mellitus ([facility]     Has a tremor     Hyperlipidemia     Hypokalemia     Intellectual disability     Kidney stone     Lennox-Gastaut syndrome (HCC)     Paralytic ileus (HCC)     Thyroid disease     Venous thrombosis and embolism      Past Surgical History:   Procedure Laterality Date    LITHOTRIPSY  2020     Allergies   Allergen Reactions    Cefazolin     Celontin [Methsuximide]     Duricef [Cefadroxil]     Simvastatin     Vicodin [Hydrocodone-Acetaminophen]        DATE ONSET: 2020    Date of Evaluation: 8/3/2020   Evaluating Therapist: Ivania Hawkins CF-SLP    Recommended Diet and Intervention  Diet Solids Recommendation: Dental Soft  Liquid Consistency Recommendation: Thin  Recommended Form of Meds: PO  Recommendations: Total feed  Therapeutic Interventions: Diet tolerance monitoring    Compensatory Swallowing Strategies  Compensatory Swallowing Strategies: Eat/Feed slowly;Upright as possible for all oral intake;Small bites/sips; Total feed    Reason for Referral  Daryle Cotta was referred for a bedside swallow evaluation to assess the efficiency of his swallow function, identify signs and symptoms of aspiration and make recommendations regarding safe dietary consistencies, effective compensatory strategies, and safe eating environment. General  Chart Reviewed: Yes  Subjective  Subjective: Pt was alert and agitated for BSE.  Pt initally stated that he did not want to eat anything, however, SLP provided encouragement and choices between trial items, and pt picked what he wanted to eat. Pt frequently shouted at SLP when asked named and birthday, and when SLP sat HOB up in order to administer trials. SLP provided encouragement and education to pt, and pt was redirected. Behavior/Cognition: Alert;Agitated  Respiratory Status: Room air  O2 Device: None (Room air)  Communication Observation: Functional  Follows Directions: Simple(Pt follows directions on his terms.)  Dentition: Adequate  Patient Positioning: Upright in bed  Baseline Vocal Quality: Normal  Consistencies Administered: Reg solid; Thin - straw    Current Diet level:  Current Diet : (Dental Soft)  Current Liquid Diet : Thin    Oral Motor Deficits  Oral/Motor  Oral Motor: (Unable to formally assess secondary to pt refusal to follow SLP's directive this date. However, decrease lingual coordination was observed during trials.)    Oral Phase Dysfunction  Oral Phase  Oral Phase: Exceptions  Oral Phase Dysfunction  Impaired Mastication: Reg Solid  Decreased Anterior to Posterior Transit: Reg solid  Lingual/Palatal Residue: Reg solid  Oral Phase  Oral Phase - Comment: Pt presents with mild oral phase dysphagia characterized by impaired mastication of regular solid, increased mastication time, and mild lingual residue following trials of regular solid (cookie). Pt was able to clear residue with independent lingual sweep. Pt refused trials of soft and bite size consistencies, and puree conistencies this date, therefore were not administered. Pt wa simpulsive when eating cookie, as he ate entire cookie in one bite. Pt also unable to self feed this date. Indicators of Pharyngeal Phase Dysfunction   Pharyngeal Phase  Pharyngeal Phase: WFL  Pharyngeal Phase   Pharyngeal: Pharyngeal phase of swallow WFL.  Laryngeal elevation was present during evaluation, noted through observation as pt was defensive to tactile stimulation. No overt s/s of aspiration were observed following any of the presented consistencies. Impression  Dysphagia Diagnosis: Mild oral stage dysphagia  Dysphagia Impression : Mild oral phase dysphagia, pharyngeal phase of swallow WFL. Dysphagia Outcome Severity Scale: Level 5: Mild dysphagia- Distant supervision. May need one diet consistency restricted     Treatment Plan  Requires SLP Intervention: Yes  Duration/Frequency of Treatment: 1-2x f/u swallow  D/C Recommendations: To be determined       Treatment/Goals  Short-term Goals  Timeframe for Short-term Goals: 1 week  Goal 1: Pt will tolerate dental soft consistencies with thin liquids with no overt s/s of aspiration in all presented opportunities. Goal 2: Pt will tolerate 5/5 trials of regular conistencies with no overt s/s of aspiration in order to determine least restrictive diet. Long-term Goals  Timeframe for Long-term Goals: 2 weeks  Goal 1: Pt will tolerate least restrictive diet with no adverse outcomes. Prognosis  Prognosis  Prognosis for safe diet advancement: guarded  Barriers to reach goals: cognitive deficits;behavior  Individuals consulted  Consulted and agree with results and recommendations: Patient;RN(RN - Georgia Reyes)    Education  Patient Education: Pt not educated on results of BSE secondary to agitation. Safety Devices in place: Yes  Type of devices: Call light within reach; Bed alarm in place    Pain Assessment:  Initial Assessment:  Patient denies pain. Re-assessment:  Patient denies pain.        Therapy Time  SLP Individual Minutes  Time In: 6800  Time Out: 0844  Minutes: 10        Signature: Electronically signed by RUSS Serrato-SLP on 8/3/2020 at 9:50 AM

## 2020-08-03 NOTE — PROGRESS NOTES
Peripheral IVs and tele removed. VSS. [facility] here to pickup patient. Patient left with with all belongings with no signs of distress noted.

## 2020-08-03 NOTE — FLOWSHEET NOTE
Pt incontinent of stool. Pt cleaned up and complete linen change. Mom at bedside. Pt still refusing PO meds. Pt medicated with IV Ativan as ordered for PICC line. Transport here to take pt to [facility].  Electronically signed by Zack Huff RN on 8/3/2020 at 3:12 PM

## 2020-08-03 NOTE — FLOWSHEET NOTE
Pt back from [facility]. Right dual PICC placed. Mom at bedside and updated.  Electronically signed by Flaquita Sneed RN on 8/3/2020 at 4:22 PM

## 2020-08-03 NOTE — FLOWSHEET NOTE
Pt refusing all AM meds, Dr Pb Mccarthy on floor and notified. Pt also refusing to work with PT/OT.  Electronically signed by Malcom Valle RN on 8/3/2020 at 9:18 AM

## 2020-08-03 NOTE — PROGRESS NOTES
Infectious Disease     Patient Name: Crystal Deal  Date: 8/3/2020  YOB: 1970  Medical Record Number: [id number]        Sepsis with pyelonephritis  ESBL Klebsiella pneumonia        Review of Systems   Unable to perform ROS: Other       Review of Systems: All 14 review of systems negative other than as stated above    Social History     Tobacco Use    Smoking status: Never Smoker    Smokeless tobacco: Never Used   Substance Use Topics    Alcohol use: Never     Frequency: Never    Drug use: Never         Past Medical History:   Diagnosis Date    CKD (chronic kidney disease)     [facility] ([facility]     Has a tremor     Hyperlipidemia     Hypokalemia     Intellectual disability     Kidney stone     Lennox-Gastaut syndrome ([facility]     [facility]     Thyroid disease     Venous thrombosis and embolism            Past Surgical History:   Procedure Laterality Date    LITHOTRIPSY  07/30/2020             Allergies   Allergen Reactions    Cefazolin     Celontin [Methsuximide]     Duricef [Cefadroxil]     Simvastatin     Vicodin [Hydrocodone-Acetaminophen]               Physical Exam   Constitutional: No distress. Cardiovascular: Normal heart sounds. No murmur heard. Pulmonary/Chest: Effort normal and breath sounds normal. No respiratory distress. He has no wheezes. He has no rales. He exhibits no tenderness. Abdominal: Soft. Bowel sounds are normal. He exhibits no distension. There is no abdominal tenderness. There is no rebound and no guarding. Skin: He is not diaphoretic. Blood pressure (!) 133/57, pulse 68, temperature 98.4 °F (36.9 °C), temperature source Oral, resp. rate 16, height 6' (1.829 m), weight 257 lb (116.6 kg), SpO2 97 %.       .   Lab Results   Component Value Date    WBC 4.6 (L) 08/03/2020    HGB 8.2 (L) 08/03/2020    HCT 25.2 (L) 08/03/2020    MCV 85.3 08/03/2020     08/03/2020     Lab Results   Component Value Date     08/03/2020 K 3.4 08/03/2020    K 2.7 08/03/2020     08/03/2020    CO2 25 08/03/2020    BUN 5 08/03/2020    CREATININE 0.67 08/03/2020    GLUCOSE 91 08/03/2020    CALCIUM 8.1 08/03/2020        Culture, Blood 1 [[id number]]  (Abnormal)  Collected: 07/30/20 1801    Order Status: Completed  Specimen: Blood  Updated: 08/01/20 0804     Blood Culture, Routine  --Abnormal       Gram stain aerobic bottle   Gram negative rods   1 out of 2 blood cultures   Further results to follow   Abnormal       Organism  Klebsiella pneumoniae ESBL DNA DetectedAbnormal       Blood Culture, Routine  --Abnormal       The following organisms and resistance markers have been   tested using nucleic acid testing technology. ORGANISMS:   Escherica coli, Klebisella pneumonia, Klebsiella oxytoca,   Pseudomonas aeruginosa, Enterobacter species, Proteus species,   Citrobacter species, Acinetobacter species. CARBAPENEM (CRE) RESISTANCE MARKERS:   kpc, imp, ndm, vim, oxa   ESBL RESISTANCE MARKER:   ctx-m   CONTACT PRECAUTIONS INDICATED   Abnormal       Organism  Gram negative rodAbnormal       Blood Culture, Routine  --     POSITIVE for   ID and sensitivity to follow      Culture, Urine [[id number]]  (Abnormal)   Collected: 07/30/20 2124    Order Status: Completed  Specimen: Urine, clean catch  Updated: 08/01/20 0750     Organism  Klebsiella pneumoniae ESBLAbnormal       Urine Culture, Routine  --Abnormal       >100,000 CFU/ml   Carbapenem antibiotics are the best option for infections caused   by ESBL producing organisms.  Other antibiotic classes are   likely to result in treatment failure, even for organisms   demonstrating in vitro susceptibility. CONTACT PRECAUTIONS INDICATED      EXAMINATION: US ABDOMEN LIMITED         CLINICAL HISTORY: ABNORMAL CT SCAN         COMPARISONS: CT ABDOMEN PELVIS, JULY 30, 2020         FINDINGS: Liver normal in size, shape, and echogenicity. No intrahepatic ductal location. Common duct measures 3.2 mm.  Color flow without anomaly. Gallbladder distended and filled. Punctate submillimeter echogenic foci identified within fluid-filled    gallbladder. No para cholecystic fluid. No gallbladder wall thickening. Pancreatic head and body normal in size, shape, and echogenicity. Pancreatic tail obscured by overlying bowel gas.              Impression    SLUDGE-FILLED GALLBLADDER POSSIBLY CONTAINING 7 MM CALCULI. NO SONOGRAPHIC EVIDENCE OF CHOLECYSTITIS. Narrative    Examination: CT ABDOMEN PELVIS W IV CONTRAST         Indication:   distention, concerns for obstruction         Technique: Multiple serial axial images was performed through the abdomen and pelvis utilizing 100cc of Optiray 370.   Images were reconstructed in the axial and coronal and sagittal planes.         Comparison:         Findings:         There is an area of focal consolidation/infiltrate within the right lower lobe. Mass is not excluded. Measures 3.9 x 1.4 cm. There is trace right pleural effusion.         The liver,         The spleen, pancreas, adrenals,  are unremarkable.         The gallbladder is distended and there is increased attenuation seen layering dependently in the gallbladder which could represent accommodation gallbladder stones and/or sludge.         The right kidney shows a double-J stent. Proximal portion lies within the right renal pelvis and the distal lies within the bladder. The bladder also contains a Jameson catheter in the bladder is partially decompressed. There is a calculi seen within the mid to upper pole of the kidney measuring approximately 1.6 cm. There is a small amount of punctate air within the inferior pelvis. Likely secondary to instrumentation. The left kidney is at the lower limits of normal. No hydronephrosis.         There is stool scattered throughout the large bowel to level of the descending colon. The sigmoid is dilated and markedly redundant. It measures up to 6.8 cm.  At the distal sigmoid and beginning INFERIOR ASPECT OF THE RIGHT LOBE OF LIVER AND EXTENDING TO THE RIGHT PARACOLIC GUTTER.           All CT scans at this facility use dose modulation, iterative reconstruction, and/or weight based dosing when appropriate to reduce radiation dose to as low as reasonably achievable.             ASSESSMENT:  Patient Active Problem List   Diagnosis    Sepsis ([facility]         PLAN:    Sepsis with pyelonephritis  ESBL Klebsiella pneumonia    On meropenem plan for 3 weeks

## 2020-08-03 NOTE — PROGRESS NOTES
Physician Progress Note      Kenney Moya  CSN #:                  [id number]  :                       1970  ADMIT DATE:       2020 3:07 PM  [address] [place] DATE:  RESPONDING  PROVIDER #:        Amparo Hancock MD          QUERY TEXT:    Patient admitted with CAUTI and sepsis. Per RN note: two stage two decubs   noted on buttocks. Please document in progress notes and discharge summary the   stage of the decubitus ulcers  and whether or not POA:    The medical record reflects the following:  Risk Factors: MRDD  epilepsy  Clinical Indicators:   1232PM RN:  bathed and bed changed with assistance of four  Pt  Screamed   and used profanity   Depends applied positioned to left side two stage two    decubs noted on buttocks   RN flowsheet: skin integrity stage 2 butt   RN flowsheet: redness tear sacrum  No preventative dressing   pt refused    incon. stool  Treatment: monitoring  Options provided:  -- Stage 2 Decubitus Pressure Ulcers of buttocks POA  -- Stage 2 Decubitus Pressure Ulcers of buttocks not POA  -- Other - I will add my own diagnosis  -- Disagree - Clinically unable to determine / Unknown  -- Refer to Clinical Documentation Reviewer    PROVIDER RESPONSE TEXT:    This patient has Stage 2 decubitus pressure ulcers of the buttocks that were   present on admission.     Query created by: Memo Howard on 8/3/2020 8:25 AM      Electronically signed by:  Amparo Hancock MD 8/3/2020 1:35 PM

## 2020-08-04 LAB
CULTURE, BLOOD 2: NORMAL
SARS-COV-2: NOT DETECTED
SOURCE: NORMAL

## 2020-08-12 ENCOUNTER — HOSPITAL ENCOUNTER (EMERGENCY)
Age: 50
Discharge: NURSING, REHAB OR LONG-TERM CARE | End: 2020-08-12
Payer: MEDICARE

## 2020-08-12 ENCOUNTER — TELEPHONE (OUTPATIENT)
Dept: OTHER | Facility: CLINIC | Age: 50
End: 2020-08-12

## 2020-08-12 VITALS
HEIGHT: 72 IN | WEIGHT: 240 LBS | HEART RATE: 61 BPM | TEMPERATURE: 96.5 F | OXYGEN SATURATION: 100 % | SYSTOLIC BLOOD PRESSURE: 105 MMHG | DIASTOLIC BLOOD PRESSURE: 75 MMHG | RESPIRATION RATE: 17 BRPM | BODY MASS INDEX: 32.51 KG/M2

## 2020-08-12 LAB
ALBUMIN SERPL-MCNC: 3.6 G/DL (ref 3.5–4.6)
ALP BLD-CCNC: 56 U/L (ref 35–104)
ALT SERPL-CCNC: 10 U/L (ref 0–41)
ANION GAP SERPL CALCULATED.3IONS-SCNC: 7 MEQ/L (ref 9–15)
AST SERPL-CCNC: 22 U/L (ref 0–40)
BASOPHILS ABSOLUTE: 0 K/UL (ref 0–0.2)
BASOPHILS RELATIVE PERCENT: 0.4 %
BILIRUB SERPL-MCNC: 0.5 MG/DL (ref 0.2–0.7)
BUN BLDV-MCNC: 7 MG/DL (ref 6–20)
CALCIUM SERPL-MCNC: 8.5 MG/DL (ref 8.5–9.9)
CHLORIDE BLD-SCNC: 106 MEQ/L (ref 95–107)
CHP ED QC CHECK: NORMAL
CO2: 28 MEQ/L (ref 20–31)
CREAT SERPL-MCNC: 0.66 MG/DL (ref 0.7–1.2)
EOSINOPHILS ABSOLUTE: 0.6 K/UL (ref 0–0.7)
EOSINOPHILS RELATIVE PERCENT: 9.1 %
GFR AFRICAN AMERICAN: >60
GFR NON-AFRICAN AMERICAN: >60
GLOBULIN: 2.7 G/DL (ref 2.3–3.5)
GLUCOSE BLD-MCNC: 85 MG/DL (ref 70–99)
GLUCOSE BLD-MCNC: 95 MG/DL
GLUCOSE BLD-MCNC: 95 MG/DL (ref 60–115)
HCT VFR BLD CALC: 28.5 % (ref 42–52)
HEMOGLOBIN: 9.3 G/DL (ref 14–18)
LYMPHOCYTES ABSOLUTE: 2 K/UL (ref 1–4.8)
LYMPHOCYTES RELATIVE PERCENT: 30.5 %
MCH RBC QN AUTO: 27.4 PG (ref 27–31.3)
MCHC RBC AUTO-ENTMCNC: 32.6 % (ref 33–37)
MCV RBC AUTO: 83.9 FL (ref 80–100)
MONOCYTES ABSOLUTE: 0.3 K/UL (ref 0.2–0.8)
MONOCYTES RELATIVE PERCENT: 4.7 %
NEUTROPHILS ABSOLUTE: 3.7 K/UL (ref 1.4–6.5)
NEUTROPHILS RELATIVE PERCENT: 55.3 %
PDW BLD-RTO: 18.4 % (ref 11.5–14.5)
PERFORMED ON: NORMAL
PLATELET # BLD: 235 K/UL (ref 130–400)
POTASSIUM SERPL-SCNC: 3.6 MEQ/L (ref 3.4–4.9)
PROLACTIN: 11.4 NG/ML (ref 4–15.2)
RBC # BLD: 3.4 M/UL (ref 4.7–6.1)
SODIUM BLD-SCNC: 141 MEQ/L (ref 135–144)
TOTAL CK: 78 U/L (ref 0–190)
TOTAL PROTEIN: 6.3 G/DL (ref 6.3–8)
VALPROIC ACID LEVEL: 7.8 UG/ML (ref 50–100)
WBC # BLD: 6.6 K/UL (ref 4.8–10.8)

## 2020-08-12 PROCEDURE — 36415 COLL VENOUS BLD VENIPUNCTURE: CPT

## 2020-08-12 PROCEDURE — 80164 ASSAY DIPROPYLACETIC ACD TOT: CPT

## 2020-08-12 PROCEDURE — 84146 ASSAY OF PROLACTIN: CPT

## 2020-08-12 PROCEDURE — 82550 ASSAY OF CK (CPK): CPT

## 2020-08-12 PROCEDURE — 80053 COMPREHEN METABOLIC PANEL: CPT

## 2020-08-12 PROCEDURE — 6370000000 HC RX 637 (ALT 250 FOR IP): Performed by: PHYSICIAN ASSISTANT

## 2020-08-12 PROCEDURE — 99284 EMERGENCY DEPT VISIT MOD MDM: CPT

## 2020-08-12 PROCEDURE — 85025 COMPLETE CBC W/AUTO DIFF WBC: CPT

## 2020-08-12 RX ADMIN — DIVALPROEX SODIUM 750 MG: 500 TABLET, EXTENDED RELEASE ORAL at 13:52

## 2020-08-12 ASSESSMENT — ENCOUNTER SYMPTOMS
RHINORRHEA: 0
CONSTIPATION: 0
SHORTNESS OF BREATH: 0
EYE DISCHARGE: 0
SORE THROAT: 0
ABDOMINAL PAIN: 0
COLOR CHANGE: 0
ABDOMINAL DISTENTION: 0

## 2020-08-12 NOTE — ED TRIAGE NOTES
Per nursing home pt has been refusing his meds this a.m pt was having seizures and nurse went to administer rectal ativan and pt refused pt arived alert x2 speaking clear full sentences no seizure activity noted pt has tremors with is normal per pt

## 2020-08-12 NOTE — ED NOTES
Bed: 08  Expected date: 8/12/20  Expected time:   Means of arrival:   Comments:  49(M) MRDD from [facility] for seizures  Pt refused rectal diazepam for staff                                                                    Jeanne Toth RN  08/12/20 8319

## 2020-08-12 NOTE — ED PROVIDER NOTES
[address] [facility] ED  eMERGENCY dEPARTMENT eNCOUnter      Pt Name: Ty Brower  MRN: [id number]  [place] 1970  Date of evaluation: 8/12/2020  Provider: Mick Bernal PA-C    CHIEF COMPLAINT       Chief Complaint   Patient presents with    Seizures     refusing meds during seizure          HISTORY OF PRESENT ILLNESS   (Location/Symptom, Timing/Onset,Context/Setting, Quality, Duration, Modifying Factors, Severity)  Note limiting factors. Ty Brower is a 44 y.o. male who presents to the emergency department with a complaint of seizure. According nursing home staff patient had seizure today, he was offered rectal Diastat at the time of seizures which he declined. Patient states he has not been taking his seizure medications, states the last time he took his medications were 8/10/2020. He cannot tell me why he is not taking his medications. He is alert and oriented at the time of my evaluation. He has no complaints. Denies any injuries or pain. HPI    NursingNotes were reviewed. REVIEW OF SYSTEMS    (2-9 systems for level 4, 10 or more for level 5)     Review of Systems   Constitutional: Negative for activity change and appetite change. HENT: Negative for congestion, ear discharge, ear pain, nosebleeds, rhinorrhea and sore throat. Eyes: Negative for discharge. Respiratory: Negative for shortness of breath. Cardiovascular: Negative for chest pain, palpitations and leg swelling. Gastrointestinal: Negative for abdominal distention, abdominal pain and constipation. Genitourinary: Negative for difficulty urinating and dysuria. Musculoskeletal: Negative for arthralgias. Skin: Negative for color change, pallor and wound. Neurological: Positive for seizures. Negative for dizziness, syncope, numbness and headaches. Psychiatric/Behavioral: Negative for agitation and confusion.        Except as noted above the remainder of the review of systems was reviewed and negative. PAST MEDICAL HISTORY     Past Medical History:   Diagnosis Date    CKD (chronic kidney disease)     Diabetes mellitus ([facility] [address]     Has a tremor     Hyperlipidemia     Hypokalemia     Intellectual disability     Kidney stone     Lennox-Gastaut syndrome (HCC)     Paralytic ileus ([facility] [address]     Thyroid disease     Venous thrombosis and embolism          SURGICALHISTORY       Past Surgical History:   Procedure Laterality Date    LITHOTRIPSY  07/30/2020         CURRENT MEDICATIONS       Discharge Medication List as of 8/12/2020  1:49 PM      CONTINUE these medications which have NOT CHANGED    Details   ferrous sulfate (IRON 325) 325 (65 Fe) MG tablet Take 1 tablet by mouth 2 times daily (with meals), Disp-30 tablet,R-3Normal      meropenem (MERREM) infusion Infuse 1,000 mg intravenously every 8 hours for 21 days Compound per protocol., Disp-21 g,R-0DC to SNF      baclofen (LIORESAL) 10 MG tablet Take 10 mg by mouth every morningHistorical Med      !! divalproex (DEPAKOTE ER) 250 MG extended release tablet Take 750 mg by mouth 2 times dailyHistorical Med      !! divalproex (DEPAKOTE ER) 500 MG extended release tablet Take 500 mg by mouth nightlyHistorical Med      Bisacodyl (DULCOLAX RE) Place 1 Units rectally daily as needed (constipation)Historical Med      Sodium Phosphates (FLEET) 7-19 GM/118ML Place 1 enema rectally daily as needed (constipation)Historical Med      gabapentin (NEURONTIN) 100 MG capsule Take 100 mg by mouth 3 times daily. Historical Med      lamoTRIgine (LAMICTAL) 200 MG tablet Take 200 mg by mouth 2 times dailyHistorical Med      magnesium hydroxide (MILK OF MAGNESIA) 400 MG/5ML suspension Take 30 mLs by mouth daily as needed for ConstipationHistorical Med      polyethylene glycol (GLYCOLAX) 17 g packet Take 17 g by mouth daily as needed for ConstipationHistorical Med      potassium chloride (KLOR-CON M) 20 MEQ TBCR extended release tablet Take 60 mEq by mouth 2 times dailyHistorical Med      propranolol (INDERAL LA) 60 MG extended release capsule Take 60 mg by mouth dailyHistorical Med      levothyroxine (SYNTHROID) 50 MCG tablet Take 50 mcg by mouth DailyHistorical Med       !! - Potential duplicate medications found. Please discuss with provider. ALLERGIES     Cefazolin; Celontin [methsuximide]; Duricef [cefadroxil]; Simvastatin; and Vicodin [hydrocodone-acetaminophen]    FAMILY HISTORY     No family history on file.        SOCIAL HISTORY       Social History     Socioeconomic History    Marital status: Single     Spouse name: Not on file    Number of children: Not on file    Years of education: Not on file    Highest education level: Not on file   Occupational History    Not on file   Social Needs    Financial resource strain: Not on file    Food insecurity     Worry: Not on file     Inability: Not on file    Transportation needs     Medical: Not on file     Non-medical: Not on file   Tobacco Use    Smoking status: Never Smoker    Smokeless tobacco: Never Used   Substance and Sexual Activity    Alcohol use: Never     Frequency: Never    Drug use: Never    Sexual activity: Not on file   Lifestyle    Physical activity     Days per week: Not on file     Minutes per session: Not on file    Stress: Not on file   Relationships    Social connections     Talks on phone: Not on file     Gets together: Not on file     Attends Congregational service: Not on file     Active member of club or organization: Not on file     Attends meetings of clubs or organizations: Not on file     Relationship status: Not on file    Intimate partner violence     Fear of current or ex partner: Not on file     Emotionally abused: Not on file     Physically abused: Not on file     Forced sexual activity: Not on file   Other Topics Concern    Not on file   Social History Narrative    Not on file       SCREENINGS      @[facility]([id number])@      PHYSICAL EXAM    (up to 7 for level 4, 8 or more for level 5)     ED Triage Vitals [08/12/20 1050]   BP Temp Temp Source Pulse Resp SpO2 Height Weight   (!) 139/101 96.5 °F (35.8 °C) Axillary 63 16 96 % 6' (1.829 m) 240 lb (108.9 kg)       Physical Exam  Vitals signs and nursing note reviewed. Constitutional:       General: He is not in acute distress. Appearance: He is well-developed. He is not ill-appearing, toxic-appearing or diaphoretic. HENT:      Head: Normocephalic. Right Ear: Tympanic membrane normal.      Left Ear: Tympanic membrane normal.      Nose: No congestion. Mouth/Throat:      Mouth: Mucous membranes are moist.      Pharynx: No oropharyngeal exudate or posterior oropharyngeal erythema. Eyes:      Extraocular Movements: Extraocular movements intact. Conjunctiva/sclera: Conjunctivae normal.      Pupils: Pupils are equal, round, and reactive to light. Neck:      Musculoskeletal: Normal range of motion and neck supple. No neck rigidity. Vascular: No JVD. Trachea: No tracheal deviation. Cardiovascular:      Rate and Rhythm: Normal rate. Pulses: Normal pulses. Heart sounds: Normal heart sounds. No murmur. No friction rub. No gallop. Pulmonary:      Effort: Pulmonary effort is normal. No tachypnea, accessory muscle usage, respiratory distress or retractions. Breath sounds: No stridor. No wheezing, rhonchi or rales. Chest:      Chest wall: No tenderness. Abdominal:      General: Abdomen is flat. Bowel sounds are normal. There is no distension or abdominal bruit. Palpations: There is no shifting dullness, fluid wave, hepatomegaly, splenomegaly, mass or pulsatile mass. Tenderness: There is no abdominal tenderness. There is no right CVA tenderness, left CVA tenderness, guarding or rebound. Negative signs include Wilcox's sign, Rovsing's sign and McBurney's sign. Musculoskeletal: Normal range of motion. General: No deformity. Skin:     General: Skin is warm and dry. Capillary Refill: Capillary refill takes less than 2 seconds. Coloration: Skin is not jaundiced. Neurological:      General: No focal deficit present. Mental Status: He is alert and oriented to person, place, and time. Mental status is at baseline. Cranial Nerves: No cranial nerve deficit. Sensory: No sensory deficit. Motor: No weakness. Coordination: Coordination normal.      Comments: Patient past history MRDD. He is alert and oriented, he is answering all questions appropriately, he is moving extremities well, he has no complaints.    Psychiatric:         Mood and Affect: Mood normal.         DIAGNOSTIC RESULTS     EKG: All EKG's are interpreted by the Emergency Department Physician who either signs or Co-signsthis chart in the absence of a cardiologist.        RADIOLOGY:   Tammy Form such as CT, Ultrasound and MRI are read by the radiologist. Plain radiographic images are visualized and preliminarily interpreted by the emergency physician with the below findings:        Interpretation per the Radiologist below, if available at the time ofthis note:    No orders to display         ED BEDSIDE ULTRASOUND:   Performed by ED Physician - none    LABS:  Labs Reviewed   COMPREHENSIVE METABOLIC PANEL - Abnormal; Notable for the following components:       Result Value    Anion Gap 7 (*)     CREATININE 0.66 (*)     All other components within normal limits   CBC WITH AUTO DIFFERENTIAL - Abnormal; Notable for the following components:    RBC 3.40 (*)     Hemoglobin 9.3 (*)     Hematocrit 28.5 (*)     MCHC 32.6 (*)     RDW 18.4 (*)     All other components within normal limits   VALPROIC ACID LEVEL, TOTAL - Abnormal; Notable for the following components:    Valproic Acid Lvl 7.8 (*)     All other components within normal limits   POCT GLUCOSE - Normal   PROLACTIN   CK   URINE RT REFLEX TO CULTURE   POCT GLUCOSE       All other labs were within normal range or not returned as of this dictation. EMERGENCY DEPARTMENT COURSE and DIFFERENTIAL DIAGNOSIS/MDM:   Vitals:    Vitals:    08/12/20 1315 08/12/20 1330 08/12/20 1345 08/12/20 1400   BP:  (!) 141/72  105/75   Pulse: 57 58 57 61   Resp: 14 12 17 17   Temp:       TempSrc:       SpO2:       Weight:       Height:              MDM  Number of Diagnoses or Management Options  Breakthrough seizure ([facility] [address]):   H/O noncompliance with medical treatment, presenting hazards to health:   Diagnosis management comments: With a complaint of seizures that occurred this morning. Patient does have past history of seizures, he states he is been noncompliant with his medication and has not taken it for the last 3 days. Staff states that at facility during his seizure activity they attempted to give him rectal Valium, which he refused. On arrival to the hospital he is alert and oriented, is had no recurrent seizures during his time here in ER. He was given a loading dose of Depakote, and advised to resume his regular medications for his seizures. He shows no acute distress, will be returned back to group home for further care, his mother is present with him at the time of his evaluation discharge, she is aware of his noncompliance. Patient was advised if any worsening or change symptoms return to the ER, he is otherwise advised to follow-up with his neurologist.      CRITICAL CARE TIME   Total Critical Care time was 0 minutes, excluding separately reportableprocedures. There was a high probability of clinicallysignificant/life threatening deterioration in the patient's condition which required my urgent intervention. CONSULTS:  None    PROCEDURES:  Unless otherwise noted below, none     Procedures    FINAL IMPRESSION      1. Breakthrough seizure ([facility] [address]    2.  H/O noncompliance with medical treatment, presenting hazards to health          DISPOSITION/PLAN   DISPOSITION Decision To Discharge 08/12/2020 01:48:24 PM      PATIENT REFERRED TO:  Maritza Christie MD  [address] [facility] [address]  [phone]    In 1 week      Charlee White MD  [address]  [address] [facility]  [phone]    In 1 week        DISCHARGE MEDICATIONS:  Discharge Medication List as of 8/12/2020  1:49 PM             (Please note that portions of this note were completed with a voice recognition program.  Efforts were made to edit the dictations but occasionally words are mis-transcribed.)    Brayan Covarrubias PA-C (electronically signed)  Attending Emergency Physician         Brayan Covarrubias PA-C  08/13/20 [id number]

## 2020-08-12 NOTE — ED NOTES
Pt In bed resting with mom at pt side pt alert talking to his mother      Bunny Hand, FRENCH  08/12/20 [id number]

## 2020-08-12 NOTE — ED NOTES
Discharge  instructions given and reviewed with mother of patient. Patient's mother  verbalized understanding. [facility] in facility to transport patient back to [place].      Buckley Goltz, RN  08/12/20 2759

## 2020-10-21 ENCOUNTER — OFFICE VISIT (OUTPATIENT)
Dept: GERIATRIC MEDICINE | Age: 50
End: 2020-10-21
Payer: MEDICARE

## 2020-10-21 DIAGNOSIS — I10 ESSENTIAL HYPERTENSION: ICD-10-CM

## 2020-10-21 DIAGNOSIS — R39.81 FUNCTIONAL URINARY INCONTINENCE: ICD-10-CM

## 2020-10-21 DIAGNOSIS — F71 MODERATE INTELLECTUAL DISABILITIES: ICD-10-CM

## 2020-10-21 DIAGNOSIS — D50.9 IRON DEFICIENCY ANEMIA, UNSPECIFIED IRON DEFICIENCY ANEMIA TYPE: ICD-10-CM

## 2020-10-21 DIAGNOSIS — G89.29 OTHER CHRONIC PAIN: ICD-10-CM

## 2020-10-21 DIAGNOSIS — K21.9 GASTROESOPHAGEAL REFLUX DISEASE WITHOUT ESOPHAGITIS: ICD-10-CM

## 2020-10-21 DIAGNOSIS — E03.9 HYPOTHYROIDISM, UNSPECIFIED TYPE: ICD-10-CM

## 2020-10-21 DIAGNOSIS — A41.9 SEPSIS, DUE TO UNSPECIFIED ORGANISM, UNSPECIFIED WHETHER ACUTE ORGAN DYSFUNCTION PRESENT (HCC): ICD-10-CM

## 2020-10-21 DIAGNOSIS — G40.909 NONINTRACTABLE EPILEPSY WITHOUT STATUS EPILEPTICUS, UNSPECIFIED EPILEPSY TYPE (HCC): Primary | ICD-10-CM

## 2020-10-21 PROCEDURE — 1111F DSCHRG MED/CURRENT MED MERGE: CPT | Performed by: PHYSICIAN ASSISTANT

## 2020-10-21 PROCEDURE — 99309 SBSQ NF CARE MODERATE MDM 30: CPT | Performed by: PHYSICIAN ASSISTANT

## 2020-10-22 LAB
ALBUMIN SERPL-MCNC: 3.8 G/DL
ALP BLD-CCNC: 56 U/L
ALT SERPL-CCNC: 5 U/L
ANION GAP SERPL CALCULATED.3IONS-SCNC: NORMAL MMOL/L
AST SERPL-CCNC: 8 U/L
AVERAGE GLUCOSE: 100
BASOPHILS ABSOLUTE: ABNORMAL
BASOPHILS RELATIVE PERCENT: 0.7 %
BILIRUB SERPL-MCNC: 0.4 MG/DL (ref 0.1–1.4)
BUN BLDV-MCNC: 22 MG/DL
CALCIUM SERPL-MCNC: 9.1 MG/DL
CHLORIDE BLD-SCNC: 108 MMOL/L
CO2: 26 MMOL/L
CREAT SERPL-MCNC: 0.9 MG/DL
EOSINOPHILS ABSOLUTE: 0.2 /ΜL
EOSINOPHILS RELATIVE PERCENT: ABNORMAL
GFR CALCULATED: NORMAL
GLUCOSE BLD-MCNC: 100 MG/DL
HBA1C MFR BLD: 5.1 %
HCT VFR BLD CALC: 33.6 % (ref 41–53)
HEMOGLOBIN: 11 G/DL (ref 13.5–17.5)
LYMPHOCYTES ABSOLUTE: 2.7 /ΜL
LYMPHOCYTES RELATIVE PERCENT: 42.2 %
MAGNESIUM: 2 MG/DL
MCH RBC QN AUTO: 28.9 PG
MCHC RBC AUTO-ENTMCNC: 32.6 G/DL
MCV RBC AUTO: 88.7 FL
MONOCYTES ABSOLUTE: 0.5 /ΜL
MONOCYTES RELATIVE PERCENT: 7.9 %
NEUTROPHILS ABSOLUTE: 2.9 /ΜL
NEUTROPHILS RELATIVE PERCENT: 45.4 %
PLATELET # BLD: 205 K/ΜL
PMV BLD AUTO: 7.9 FL
POTASSIUM SERPL-SCNC: 4.6 MMOL/L
RBC # BLD: 3.8 10^6/ΜL
SODIUM BLD-SCNC: 143 MMOL/L
TOTAL PROTEIN: 6
WBC # BLD: 6.4 10^3/ML

## 2020-10-28 ENCOUNTER — OFFICE VISIT (OUTPATIENT)
Dept: GERIATRIC MEDICINE | Age: 50
End: 2020-10-28
Payer: MEDICARE

## 2020-10-28 DIAGNOSIS — N39.0 RECURRENT UTI: ICD-10-CM

## 2020-10-28 DIAGNOSIS — R33.9 URINARY RETENTION: Primary | ICD-10-CM

## 2020-10-28 PROCEDURE — 99442 PR PHYS/QHP TELEPHONE EVALUATION 11-20 MIN: CPT | Performed by: PHYSICIAN ASSISTANT

## 2020-10-28 RX ORDER — POTASSIUM CHLORIDE 20MEQ/15ML
40 LIQUID (ML) ORAL 3 TIMES DAILY
Status: ON HOLD | COMMUNITY
End: 2020-11-10 | Stop reason: HOSPADM

## 2020-10-28 RX ORDER — POTASSIUM CHLORIDE 20MEQ/15ML
40 LIQUID (ML) ORAL 3 TIMES DAILY
OUTPATIENT
Start: 2020-10-28

## 2020-11-04 RX ORDER — GABAPENTIN 100 MG/1
100 CAPSULE ORAL 3 TIMES DAILY
Qty: 90 CAPSULE | Refills: 2 | Status: SHIPPED | OUTPATIENT
Start: 2020-11-04 | End: 2021-02-15 | Stop reason: SDUPTHER

## 2020-11-07 ENCOUNTER — APPOINTMENT (OUTPATIENT)
Dept: CT IMAGING | Age: 50
End: 2020-11-07
Payer: MEDICARE

## 2020-11-07 ENCOUNTER — HOSPITAL ENCOUNTER (INPATIENT)
Age: 50
LOS: 3 days | Discharge: NURSING, REHAB OR LONG-TERM CARE | DRG: 389 | End: 2020-11-10
Attending: SURGERY | Admitting: SURGERY
Payer: MEDICARE

## 2020-11-07 ENCOUNTER — HOSPITAL ENCOUNTER (EMERGENCY)
Age: 50
Discharge: ANOTHER HOSPITAL | End: 2020-11-07
Attending: EMERGENCY MEDICINE
Payer: MEDICARE

## 2020-11-07 VITALS
SYSTOLIC BLOOD PRESSURE: 143 MMHG | TEMPERATURE: 97.3 F | DIASTOLIC BLOOD PRESSURE: 81 MMHG | OXYGEN SATURATION: 100 % | HEART RATE: 84 BPM | RESPIRATION RATE: 16 BRPM

## 2020-11-07 PROBLEM — K56.609 SMALL BOWEL OBSTRUCTION (HCC): Status: ACTIVE | Noted: 2020-11-07

## 2020-11-07 LAB
ALBUMIN SERPL-MCNC: 4.5 G/DL (ref 3.5–4.6)
ALP BLD-CCNC: 68 U/L (ref 35–104)
ALT SERPL-CCNC: <5 U/L (ref 0–41)
AMORPHOUS: ABNORMAL
ANION GAP SERPL CALCULATED.3IONS-SCNC: 11 MEQ/L (ref 9–15)
APTT: 34.1 SEC (ref 24.4–36.8)
AST SERPL-CCNC: 10 U/L (ref 0–40)
BACTERIA: ABNORMAL /HPF
BASOPHILS ABSOLUTE: 0 K/UL (ref 0–0.2)
BASOPHILS RELATIVE PERCENT: 0.3 %
BILIRUB SERPL-MCNC: 0.6 MG/DL (ref 0.2–0.7)
BILIRUBIN URINE: ABNORMAL
BLOOD, URINE: ABNORMAL
BUN BLDV-MCNC: 20 MG/DL (ref 6–20)
CALCIUM SERPL-MCNC: 10.4 MG/DL (ref 8.5–9.9)
CHLORIDE BLD-SCNC: 101 MEQ/L (ref 95–107)
CLARITY: ABNORMAL
CO2: 27 MEQ/L (ref 20–31)
COLOR: YELLOW
CREAT SERPL-MCNC: 0.92 MG/DL (ref 0.7–1.2)
EOSINOPHILS ABSOLUTE: 0.1 K/UL (ref 0–0.7)
EOSINOPHILS RELATIVE PERCENT: 1.3 %
EPITHELIAL CELLS, UA: ABNORMAL /HPF
GFR AFRICAN AMERICAN: >60
GFR NON-AFRICAN AMERICAN: >60
GLOBULIN: 3.6 G/DL (ref 2.3–3.5)
GLUCOSE BLD-MCNC: 126 MG/DL (ref 70–99)
GLUCOSE URINE: NEGATIVE MG/DL
HCT VFR BLD CALC: 42.8 % (ref 42–52)
HEMOGLOBIN: 14.3 G/DL (ref 14–18)
HYALINE CASTS: ABNORMAL /LPF (ref 0–5)
INR BLD: 1
KETONES, URINE: 40 MG/DL
LACTIC ACID: 1.4 MMOL/L (ref 0.5–2.2)
LEUKOCYTE ESTERASE, URINE: ABNORMAL
LIPASE: 23 U/L (ref 12–95)
LYMPHOCYTES ABSOLUTE: 1.2 K/UL (ref 1–4.8)
LYMPHOCYTES RELATIVE PERCENT: 14.7 %
MAGNESIUM: 2 MG/DL (ref 1.7–2.4)
MCH RBC QN AUTO: 29.4 PG (ref 27–31.3)
MCHC RBC AUTO-ENTMCNC: 33.3 % (ref 33–37)
MCV RBC AUTO: 88.1 FL (ref 80–100)
MONOCYTES ABSOLUTE: 0.8 K/UL (ref 0.2–0.8)
MONOCYTES RELATIVE PERCENT: 9.1 %
MUCUS: PRESENT /LPF
NEUTROPHILS ABSOLUTE: 6.1 K/UL (ref 1.4–6.5)
NEUTROPHILS RELATIVE PERCENT: 74.6 %
NITRITE, URINE: NEGATIVE
PDW BLD-RTO: 16.8 % (ref 11.5–14.5)
PH UA: 5.5 (ref 5–9)
PLATELET # BLD: 373 K/UL (ref 130–400)
POTASSIUM SERPL-SCNC: 4.7 MEQ/L (ref 3.4–4.9)
PROTEIN UA: 100 MG/DL
PROTHROMBIN TIME: 13.4 SEC (ref 12.3–14.9)
RBC # BLD: 4.85 M/UL (ref 4.7–6.1)
RBC UA: ABNORMAL /HPF (ref 0–2)
SODIUM BLD-SCNC: 139 MEQ/L (ref 135–144)
SPECIFIC GRAVITY UA: 1.02 (ref 1–1.03)
TOTAL PROTEIN: 8.1 G/DL (ref 6.3–8)
URINE REFLEX TO CULTURE: YES
UROBILINOGEN, URINE: 1 E.U./DL
WBC # BLD: 8.2 K/UL (ref 4.8–10.8)
WBC UA: >100 /HPF (ref 0–5)

## 2020-11-07 PROCEDURE — 85730 THROMBOPLASTIN TIME PARTIAL: CPT

## 2020-11-07 PROCEDURE — 87086 URINE CULTURE/COLONY COUNT: CPT

## 2020-11-07 PROCEDURE — 87186 SC STD MICRODIL/AGAR DIL: CPT

## 2020-11-07 PROCEDURE — 87040 BLOOD CULTURE FOR BACTERIA: CPT

## 2020-11-07 PROCEDURE — 36415 COLL VENOUS BLD VENIPUNCTURE: CPT

## 2020-11-07 PROCEDURE — 81001 URINALYSIS AUTO W/SCOPE: CPT

## 2020-11-07 PROCEDURE — 1210000000 HC MED SURG R&B

## 2020-11-07 PROCEDURE — 80053 COMPREHEN METABOLIC PANEL: CPT

## 2020-11-07 PROCEDURE — 85025 COMPLETE CBC W/AUTO DIFF WBC: CPT

## 2020-11-07 PROCEDURE — 83690 ASSAY OF LIPASE: CPT

## 2020-11-07 PROCEDURE — 6370000000 HC RX 637 (ALT 250 FOR IP): Performed by: SURGERY

## 2020-11-07 PROCEDURE — 6360000004 HC RX CONTRAST MEDICATION: Performed by: EMERGENCY MEDICINE

## 2020-11-07 PROCEDURE — 6360000002 HC RX W HCPCS: Performed by: SURGERY

## 2020-11-07 PROCEDURE — 96365 THER/PROPH/DIAG IV INF INIT: CPT

## 2020-11-07 PROCEDURE — 96376 TX/PRO/DX INJ SAME DRUG ADON: CPT

## 2020-11-07 PROCEDURE — 87077 CULTURE AEROBIC IDENTIFY: CPT

## 2020-11-07 PROCEDURE — 99284 EMERGENCY DEPT VISIT MOD MDM: CPT

## 2020-11-07 PROCEDURE — 74177 CT ABD & PELVIS W/CONTRAST: CPT

## 2020-11-07 PROCEDURE — 2580000003 HC RX 258: Performed by: EMERGENCY MEDICINE

## 2020-11-07 PROCEDURE — 85610 PROTHROMBIN TIME: CPT

## 2020-11-07 PROCEDURE — 6360000002 HC RX W HCPCS: Performed by: EMERGENCY MEDICINE

## 2020-11-07 PROCEDURE — 83605 ASSAY OF LACTIC ACID: CPT

## 2020-11-07 PROCEDURE — 2580000003 HC RX 258: Performed by: SURGERY

## 2020-11-07 PROCEDURE — 83735 ASSAY OF MAGNESIUM: CPT

## 2020-11-07 RX ORDER — SODIUM CHLORIDE 9 MG/ML
INJECTION, SOLUTION INTRAVENOUS CONTINUOUS
Status: DISCONTINUED | OUTPATIENT
Start: 2020-11-07 | End: 2020-11-10 | Stop reason: HOSPADM

## 2020-11-07 RX ORDER — 0.9 % SODIUM CHLORIDE 0.9 %
1000 INTRAVENOUS SOLUTION INTRAVENOUS ONCE
Status: COMPLETED | OUTPATIENT
Start: 2020-11-07 | End: 2020-11-07

## 2020-11-07 RX ORDER — ONDANSETRON 2 MG/ML
4 INJECTION INTRAMUSCULAR; INTRAVENOUS EVERY 6 HOURS PRN
Status: DISCONTINUED | OUTPATIENT
Start: 2020-11-07 | End: 2020-11-10 | Stop reason: HOSPADM

## 2020-11-07 RX ORDER — ONDANSETRON 2 MG/ML
4 INJECTION INTRAMUSCULAR; INTRAVENOUS ONCE
Status: COMPLETED | OUTPATIENT
Start: 2020-11-07 | End: 2020-11-07

## 2020-11-07 RX ORDER — LEVOTHYROXINE SODIUM 0.05 MG/1
50 TABLET ORAL DAILY
Status: DISCONTINUED | OUTPATIENT
Start: 2020-11-08 | End: 2020-11-10 | Stop reason: HOSPADM

## 2020-11-07 RX ORDER — BISACODYL 10 MG
10 SUPPOSITORY, RECTAL RECTAL DAILY
Status: DISCONTINUED | OUTPATIENT
Start: 2020-11-08 | End: 2020-11-10 | Stop reason: HOSPADM

## 2020-11-07 RX ORDER — BACLOFEN 10 MG/1
10 TABLET ORAL EVERY MORNING
Status: DISCONTINUED | OUTPATIENT
Start: 2020-11-08 | End: 2020-11-10 | Stop reason: HOSPADM

## 2020-11-07 RX ORDER — METOCLOPRAMIDE HYDROCHLORIDE 5 MG/ML
5 INJECTION INTRAMUSCULAR; INTRAVENOUS EVERY 8 HOURS
Status: DISCONTINUED | OUTPATIENT
Start: 2020-11-07 | End: 2020-11-10 | Stop reason: HOSPADM

## 2020-11-07 RX ORDER — PROPRANOLOL HCL 60 MG
60 CAPSULE, EXTENDED RELEASE 24HR ORAL DAILY
Status: DISCONTINUED | OUTPATIENT
Start: 2020-11-08 | End: 2020-11-10 | Stop reason: HOSPADM

## 2020-11-07 RX ORDER — KETOROLAC TROMETHAMINE 15 MG/ML
15 INJECTION, SOLUTION INTRAMUSCULAR; INTRAVENOUS EVERY 6 HOURS PRN
Status: DISCONTINUED | OUTPATIENT
Start: 2020-11-07 | End: 2020-11-10 | Stop reason: HOSPADM

## 2020-11-07 RX ORDER — DIVALPROEX SODIUM 500 MG/1
500 TABLET, EXTENDED RELEASE ORAL NIGHTLY
Status: DISCONTINUED | OUTPATIENT
Start: 2020-11-07 | End: 2020-11-10 | Stop reason: HOSPADM

## 2020-11-07 RX ORDER — LAMOTRIGINE 200 MG/1
200 TABLET ORAL 2 TIMES DAILY
Status: DISCONTINUED | OUTPATIENT
Start: 2020-11-07 | End: 2020-11-10 | Stop reason: HOSPADM

## 2020-11-07 RX ADMIN — ONDANSETRON 4 MG: 2 INJECTION INTRAMUSCULAR; INTRAVENOUS at 16:01

## 2020-11-07 RX ADMIN — DIVALPROEX SODIUM 500 MG: 500 TABLET, EXTENDED RELEASE ORAL at 22:37

## 2020-11-07 RX ADMIN — KETOROLAC TROMETHAMINE 15 MG: 15 INJECTION, SOLUTION INTRAMUSCULAR; INTRAVENOUS at 22:36

## 2020-11-07 RX ADMIN — IOPAMIDOL 100 ML: 755 INJECTION, SOLUTION INTRAVENOUS at 16:45

## 2020-11-07 RX ADMIN — ONDANSETRON 4 MG: 2 INJECTION INTRAMUSCULAR; INTRAVENOUS at 22:36

## 2020-11-07 RX ADMIN — CEFTRIAXONE 1 G: 1 INJECTION, POWDER, FOR SOLUTION INTRAMUSCULAR; INTRAVENOUS at 17:58

## 2020-11-07 RX ADMIN — ONDANSETRON 4 MG: 2 INJECTION INTRAMUSCULAR; INTRAVENOUS at 18:51

## 2020-11-07 RX ADMIN — SODIUM CHLORIDE 1000 ML: 9 INJECTION, SOLUTION INTRAVENOUS at 16:01

## 2020-11-07 RX ADMIN — METOCLOPRAMIDE HYDROCHLORIDE 5 MG: 5 INJECTION INTRAMUSCULAR; INTRAVENOUS at 22:36

## 2020-11-07 RX ADMIN — SODIUM CHLORIDE: 9 INJECTION, SOLUTION INTRAVENOUS at 22:36

## 2020-11-07 ASSESSMENT — ENCOUNTER SYMPTOMS
PHOTOPHOBIA: 0
APNEA: 0
ABDOMINAL DISTENTION: 0
SORE THROAT: 0
EYE PAIN: 0
COUGH: 0
NAUSEA: 1
SINUS PRESSURE: 0
SHORTNESS OF BREATH: 0
DIARRHEA: 0
VOMITING: 1
COLOR CHANGE: 0
RHINORRHEA: 0
ABDOMINAL PAIN: 1
CONSTIPATION: 0
WHEEZING: 0
BACK PAIN: 1

## 2020-11-07 ASSESSMENT — PAIN SCALES - GENERAL: PAINLEVEL_OUTOF10: 2

## 2020-11-07 NOTE — ED TRIAGE NOTES
Pt arrived to ED from [facility] for abd pain and N/V x 3hrs. Pt denies abd pain at this time. Pt alert and oriented x 1-2.

## 2020-11-07 NOTE — ED PROVIDER NOTES
[address] [place] [address] ED  eMERGENCY dEPARTMENT eNCOUnter      Pt Name: Samantha Hager  MRN: [id number]  [place] 1970  Date of evaluation: 11/7/2020  Provider: Aniya Smith MD    CHIEF COMPLAINT       Chief Complaint   Patient presents with    Abdominal Pain    Back Pain         HISTORY OF PRESENT ILLNESS   (Location/Symptom, Timing/Onset,Context/Setting, Quality, Duration, Modifying Factors, Severity)  Note limiting factors. Samantha Hager is a 44 y.o. male who presents to the emergency department with complaint of several episodes of bilious vomiting over the last 3 hours. He denies abdominal pain. Does have nonquantifiable low back pain. Patient is MRDD. He responds appropriately to questions. Denies fever or chills. Denies cough or expectoration. Denies chest pain, shortness of breath, diarrhea or constipation. Lives at [place] [address]. Other comorbid conditions include hypothyroidism, seizure disorder, hypertension, chronic constipation, chronic kidney disease, diabetes mellitus, tremors, hypokalemia, Lennox Gestalt syndrome, venous thromboembolism. No prior abdominal surgeries. HPI    Nursing Notes were reviewed. REVIEW OF SYSTEMS    (2-9 systems for level 4, 10 or more for level 5)     Review of Systems   Constitutional: Negative. Negative for activity change, appetite change, chills, fatigue and fever. HENT: Negative for congestion, ear discharge, ear pain, hearing loss, rhinorrhea, sinus pressure and sore throat. Eyes: Negative for photophobia, pain and visual disturbance. Respiratory: Negative for apnea, cough, shortness of breath and wheezing. Cardiovascular: Negative for chest pain, palpitations and leg swelling. Gastrointestinal: Positive for abdominal pain, nausea and vomiting. Negative for abdominal distention, constipation and diarrhea. Endocrine: Negative for cold intolerance, heat intolerance and polyuria.    Genitourinary: Negative for dysuria, flank pain, frequency and urgency. Musculoskeletal: Positive for back pain. Negative for arthralgias, gait problem, myalgias and neck stiffness. Skin: Negative for color change, pallor and rash. Allergic/Immunologic: Negative for food allergies and immunocompromised state. Neurological: Negative for dizziness, tremors, syncope, weakness, light-headedness and headaches. Psychiatric/Behavioral: Negative for agitation, confusion and hallucinations. All other systems reviewed and are negative. Except as noted above the remainder of the review of systems was reviewed and negative. PAST MEDICAL HISTORY     Past Medical History:   Diagnosis Date    CKD (chronic kidney disease)     Diabetes mellitus ([facility] [address]     Has a tremor     Hyperlipidemia     Hypokalemia     Intellectual disability     Kidney stone     Lennox-Gastaut syndrome (HCC)     Paralytic ileus (HCC)     Thyroid disease     Venous thrombosis and embolism          SURGICAL HISTORY       Past Surgical History:   Procedure Laterality Date    LITHOTRIPSY  07/30/2020         CURRENT MEDICATIONS       Discharge Medication List as of 11/7/2020  8:02 PM      CONTINUE these medications which have NOT CHANGED    Details   gabapentin (NEURONTIN) 100 MG capsule Take 1 capsule by mouth 3 times daily for 30 days. , Disp-90 capsule,R-2Normal      potassium chloride 20 MEQ/15ML (10%) oral solution Take 40 mEq by mouth 3 times dailyHistorical Med      ferrous sulfate (IRON 325) 325 (65 Fe) MG tablet Take 1 tablet by mouth 2 times daily (with meals), Disp-30 tablet,R-3Normal      baclofen (LIORESAL) 10 MG tablet Take 10 mg by mouth every morningHistorical Med      !! divalproex (DEPAKOTE ER) 250 MG extended release tablet Take 750 mg by mouth 2 times dailyHistorical Med      !! divalproex (DEPAKOTE ER) 500 MG extended release tablet Take 500 mg by mouth nightlyHistorical Med      Bisacodyl (DULCOLAX RE) Place 1 Units rectally daily as needed (constipation)Historical Med      Sodium Phosphates (FLEET) 7-19 GM/118ML Place 1 enema rectally daily as needed (constipation)Historical Med      lamoTRIgine (LAMICTAL) 200 MG tablet Take 200 mg by mouth 2 times dailyHistorical Med      magnesium hydroxide (MILK OF MAGNESIA) 400 MG/5ML suspension Take 30 mLs by mouth daily as needed for ConstipationHistorical Med      polyethylene glycol (GLYCOLAX) 17 g packet Take 17 g by mouth daily as needed for ConstipationHistorical Med      propranolol (INDERAL LA) 60 MG extended release capsule Take 60 mg by mouth dailyHistorical Med      levothyroxine (SYNTHROID) 50 MCG tablet Take 50 mcg by mouth DailyHistorical Med       !! - Potential duplicate medications found. Please discuss with provider. ALLERGIES     Cefazolin; Celontin [methsuximide]; Duricef [cefadroxil]; Simvastatin; and Vicodin [hydrocodone-acetaminophen]    FAMILY HISTORY     History reviewed. No pertinent family history.        SOCIAL HISTORY       Social History     Socioeconomic History    Marital status: Single     Spouse name: None    Number of children: None    Years of education: None    Highest education level: None   Occupational History    None   Social Needs    Financial resource strain: None    Food insecurity     Worry: None     Inability: None    Transportation needs     Medical: None     Non-medical: None   Tobacco Use    Smoking status: Never Smoker    Smokeless tobacco: Never Used   Substance and Sexual Activity    Alcohol use: Never     Frequency: Never    Drug use: Never    Sexual activity: Not Currently   Lifestyle    Physical activity     Days per week: None     Minutes per session: None    Stress: None   Relationships    Social connections     Talks on phone: None     Gets together: None     Attends Cheondoism service: None     Active member of club or organization: None     Attends meetings of clubs or organizations: None     Relationship status: None    Intimate partner violence     Fear of current or ex partner: None     Emotionally abused: None     Physically abused: None     Forced sexual activity: None   Other Topics Concern    None   Social History Narrative    None       SCREENINGS             PHYSICAL EXAM    (up to 7 for level 4, 8 or more for level 5)     ED Triage Vitals [11/07/20 1551]   BP Temp Temp Source Pulse Resp SpO2 Height Weight   (!) 153/84 97.3 °F (36.3 °C) Axillary 83 18 99 % -- --       Physical Exam  Vitals signs and nursing note reviewed. Constitutional:       General: He is not in acute distress. Appearance: He is well-developed. He is obese. He is not ill-appearing, toxic-appearing or diaphoretic. HENT:      Head: Normocephalic and atraumatic. Nose: Nose normal.      Mouth/Throat:      Mouth: Mucous membranes are moist.      Pharynx: No pharyngeal swelling or oropharyngeal exudate. Eyes:      General: No scleral icterus. Right eye: No discharge. Left eye: No discharge. Extraocular Movements: Extraocular movements intact. Conjunctiva/sclera: Conjunctivae normal.      Pupils: Pupils are equal, round, and reactive to light. Neck:      Musculoskeletal: Normal range of motion and neck supple. Thyroid: No thyromegaly. Vascular: No JVD. Trachea: No tracheal deviation. Cardiovascular:      Rate and Rhythm: Normal rate and regular rhythm. Heart sounds: Normal heart sounds. No murmur. No friction rub. No gallop. Pulmonary:      Effort: Pulmonary effort is normal. No respiratory distress. Breath sounds: Normal breath sounds. No stridor. No wheezing, rhonchi or rales. Chest:      Chest wall: No tenderness. Abdominal:      General: Abdomen is protuberant. Bowel sounds are normal. There is distension. There is no abdominal bruit. There are no signs of injury. Palpations: Abdomen is soft.  There is no shifting dullness, fluid wave, hepatomegaly, splenomegaly, mass or pulsatile mass.      Tenderness: There is no abdominal tenderness. There is no right CVA tenderness, left CVA tenderness, guarding or rebound. Negative signs include Wilcox's sign, Rovsing's sign, McBurney's sign, psoas sign and obturator sign. Hernia: No hernia is present. There is no hernia in the umbilical area, ventral area, left inguinal area, right femoral area, left femoral area or right inguinal area. Comments: Hypoactive bowel sounds   Genitourinary:     Scrotum/Testes:         Right: Mass, tenderness or swelling not present. Left: Mass, tenderness or swelling not present. Musculoskeletal: Normal range of motion. General: No tenderness or deformity. Lymphadenopathy:      Cervical: No cervical adenopathy. Skin:     General: Skin is warm and dry. Coloration: Skin is not cyanotic, jaundiced, mottled or pale. Findings: No erythema or rash. Neurological:      Mental Status: He is alert and oriented to person, place, and time. Cranial Nerves: No cranial nerve deficit. Motor: No weakness or abnormal muscle tone. Coordination: Coordination normal.      Deep Tendon Reflexes: Reflexes are normal and symmetric. Reflexes normal.      Comments: At his baseline neurological status   Psychiatric:         Mood and Affect: Mood is not anxious or depressed. Behavior: Behavior normal.         Thought Content:  Thought content normal.         Judgment: Judgment normal.         DIAGNOSTIC RESULTS     EKG: All EKG's are interpreted by the Emergency Department Physician who either signs or Co-signs this chart in the absence of a cardiologist.        RADIOLOGY:   Non-plain film images such as CT, Ultrasound and MRI are read by the radiologist. [facility] are visualized and preliminarily interpreted by the emergency physician with the below findings:        Interpretation per the Radiologist below, if available at the time of this note:    CT ABDOMEN PELVIS W IV CONTRAST Additional Contrast? None   Final Result   There is a small bowel obstruction. There are fluid-filled distended loops of proximal small bowel measuring up to 5.3 cm with a possible subtle transition in the lower abdomen to the left of midline. ED BEDSIDE ULTRASOUND:   Performed by ED Physician - none    LABS:  Labs Reviewed   COMPREHENSIVE METABOLIC PANEL - Abnormal; Notable for the following components:       Result Value    Glucose 126 (*)     Calcium 10.4 (*)     Total Protein 8.1 (*)     Globulin 3.6 (*)     All other components within normal limits   CBC WITH AUTO DIFFERENTIAL - Abnormal; Notable for the following components:    RDW 16.8 (*)     All other components within normal limits   URINE RT REFLEX TO CULTURE - Abnormal; Notable for the following components:    Ketones, Urine 40 (*)     Protein,  (*)     All other components within normal limits   MICROSCOPIC URINALYSIS - Abnormal; Notable for the following components:    WBC, UA >100 (*)     RBC, UA 10-20 (*)     Bacteria, UA MODERATE (*)     All other components within normal limits   CULTURE, BLOOD 1   CULTURE, BLOOD 2   CULTURE, URINE   LACTIC ACID, PLASMA   LIPASE   MAGNESIUM   PROTIME-INR   APTT       All other labs were within normal range or not returned as of this dictation. EMERGENCY DEPARTMENT COURSE and DIFFERENTIALDIAGNOSIS/MDM:    Patient continued with bilious vomiting through ED course. Urine showed acute UTI. CT of the abdomen shows small bowel obstruction. These findings were discussed with general surgeon on-call  who graciously accepted patient in transfer. Care plan was discussed with patient and his mom and they were agreeable. All of their questions were addressed to their satisfaction.    Vitals:    Vitals:    11/07/20 1551 11/07/20 1800 11/07/20 1905   BP: (!) 153/84 (!) 154/81 (!) 143/81   Pulse: 83 84 84   Resp: 18 18 16   Temp: 97.3 °F (36.3 °C)     TempSrc:

## 2020-11-08 PROBLEM — E03.9 HYPOTHYROIDISM: Status: ACTIVE | Noted: 2020-11-08

## 2020-11-08 PROBLEM — I10 HTN (HYPERTENSION): Status: ACTIVE | Noted: 2020-11-08

## 2020-11-08 PROBLEM — G89.29 CHRONIC PAIN: Status: ACTIVE | Noted: 2020-11-08

## 2020-11-08 PROBLEM — G40.909 EPILEPTIC SEIZURE (HCC): Status: ACTIVE | Noted: 2020-11-08

## 2020-11-08 PROCEDURE — 6370000000 HC RX 637 (ALT 250 FOR IP): Performed by: SURGERY

## 2020-11-08 PROCEDURE — 2580000003 HC RX 258: Performed by: SURGERY

## 2020-11-08 PROCEDURE — 99222 1ST HOSP IP/OBS MODERATE 55: CPT | Performed by: SURGERY

## 2020-11-08 PROCEDURE — 1210000000 HC MED SURG R&B

## 2020-11-08 PROCEDURE — 6360000002 HC RX W HCPCS: Performed by: SURGERY

## 2020-11-08 RX ADMIN — SODIUM CHLORIDE: 9 INJECTION, SOLUTION INTRAVENOUS at 18:10

## 2020-11-08 RX ADMIN — LAMOTRIGINE 200 MG: 200 TABLET ORAL at 08:11

## 2020-11-08 RX ADMIN — DIVALPROEX SODIUM 750 MG: 500 TABLET, EXTENDED RELEASE ORAL at 08:11

## 2020-11-08 RX ADMIN — BACLOFEN 10 MG: 10 TABLET ORAL at 08:11

## 2020-11-08 RX ADMIN — LAMOTRIGINE 200 MG: 200 TABLET ORAL at 21:18

## 2020-11-08 RX ADMIN — SODIUM CHLORIDE: 9 INJECTION, SOLUTION INTRAVENOUS at 08:10

## 2020-11-08 RX ADMIN — DIVALPROEX SODIUM 500 MG: 500 TABLET, EXTENDED RELEASE ORAL at 21:18

## 2020-11-08 RX ADMIN — METOCLOPRAMIDE HYDROCHLORIDE 5 MG: 5 INJECTION INTRAMUSCULAR; INTRAVENOUS at 04:43

## 2020-11-08 RX ADMIN — METOCLOPRAMIDE HYDROCHLORIDE 5 MG: 5 INJECTION INTRAMUSCULAR; INTRAVENOUS at 17:15

## 2020-11-08 RX ADMIN — PROPRANOLOL HYDROCHLORIDE 60 MG: 60 CAPSULE, EXTENDED RELEASE ORAL at 08:11

## 2020-11-08 RX ADMIN — LEVOTHYROXINE SODIUM 50 MCG: 0.05 TABLET ORAL at 05:45

## 2020-11-08 ASSESSMENT — ENCOUNTER SYMPTOMS
ABDOMINAL PAIN: 0
COUGH: 0
SHORTNESS OF BREATH: 0
DIARRHEA: 0

## 2020-11-08 NOTE — PROGRESS NOTES
Contacted guardian Indu Mejia regarding his admission, spoke with her and reviewed home medications. COVID test ordered per protocol. Patient was straight cathed there and a post residual bladder scan was performed showing only 10 cc's retaining in the bladder after straight cath. Patient is incontinent of both urine and stool. Patient is NPO with sips of meds. Patient is nauseous and had 1 emesis of 400 mL's out of green/brown fluid. Avasys placed in the room due to safety -patient is MRDD from [address] [place] [address] and is in [facility] rule out however became verbally aggressive when presented with covid test (unable to safely perform test at this time). Patient would not allow me to roll or reposition him.        Per LPN pt refuses any treatment at   [address] [place] [address]

## 2020-11-08 NOTE — H&P
HISTORY AND PHYSICAL EXAM    Pt Name: Celestine Escobar  MRN: [id number]  YOB: 1970  Date of evaluation: 11/8/2020  Primary Care Physician: Natalie Maya MD  Patient evaluated at the request of Karolina Cornelius, BRANDON  Reason for evaluation: Small bowel obstruction  IMPRESSIONS:   1. Small bowel obstruction  2. Lennox-Gastaut syndrome  3. MRDD  4. Constipation  PLANS:   1. Admit type: Inpatient  2. It is expected this patient's LOS will be: Greater than 2 midnights  3. Anticipated Disposition Upon Discharge: Back to nursing home  4. Analgesics and antiemetics on a prn basis  5. IV hydration  6. DVT prophylaxis with SCD's and Lovenox  7. Home Medications as ordered  8. Hospitalist consultation  9. Small bowel follow-through tomorrow  10. Reglan IV and Dulcolax suppositories  SUBJECTIVE:   History of Chief Complaint:    Fina Kwong is a 44 y.o.male who presents [facility] the emergency room at [facility] with about a 3-hour history of nausea and vomiting. He was complaining of some mild back pain but no abdominal pain. He has MRDD as well as Lennox Gastaut Syndrome. CT scan of the abdomen and pelvis in the emergency room showed There is a small bowel obstruction. There are fluid-filled distended loops of proximal small bowel measuring up to 5.3 cm with a possible subtle transition in the lower abdomen to the left of midline. He denies any abdominal pain and any nausea today. No history of previous abdominal surgery. White blood cell count 8200 and hemoglobin 14.3. Lactic acid is normal.  Past Medical History   has a past medical history of CKD (chronic kidney disease), Diabetes mellitus ([address] Has a tremor, Hyperlipidemia, Hypokalemia, Intellectual disability, Kidney stone, Lennox-Gastaut syndrome ([address] Paralytic ileus ([address] Thyroid disease, and Venous thrombosis and embolism.   Past Surgical History   has a past surgical history that includes Lithotripsy (07/30/2020). Medications  Prior to Admission medications    Medication Sig Start Date End Date Taking? Authorizing Provider   gabapentin (NEURONTIN) 100 MG capsule Take 1 capsule by mouth 3 times daily for 30 days.  11/4/20 12/4/20  Magnus Guerra MD   potassium chloride 20 MEQ/15ML (10%) oral solution Take 40 mEq by mouth 3 times daily    Historical Provider, MD   ferrous sulfate (IRON 325) 325 (65 Fe) MG tablet Take 1 tablet by mouth 2 times daily (with meals) 8/3/20   Jl Hill MD   baclofen (LIORESAL) 10 MG tablet Take 10 mg by mouth every morning    Historical Provider, MD   divalproex (DEPAKOTE ER) 250 MG extended release tablet Take 750 mg by mouth 2 times daily    Historical Provider, MD   divalproex (DEPAKOTE ER) 500 MG extended release tablet Take 500 mg by mouth nightly    Historical Provider, MD   Bisacodyl (DULCOLAX RE) Place 1 Units rectally daily as needed (constipation)    Historical Provider, MD   Sodium Phosphates (FLEET) 7-19 GM/118ML Place 1 enema rectally daily as needed (constipation)    Historical Provider, MD   lamoTRIgine (LAMICTAL) 200 MG tablet Take 200 mg by mouth 2 times daily    Historical Provider, MD   magnesium hydroxide (MILK OF MAGNESIA) 400 MG/5ML suspension Take 30 mLs by mouth daily as needed for Constipation    Historical Provider, MD   polyethylene glycol (GLYCOLAX) 17 g packet Take 17 g by mouth daily as needed for Constipation    Historical Provider, MD   propranolol (INDERAL LA) 60 MG extended release capsule Take 60 mg by mouth daily    Historical Provider, MD   levothyroxine (SYNTHROID) 50 MCG tablet Take 50 mcg by mouth Daily    Historical Provider, MD    Scheduled Meds:   metoclopramide  5 mg Intravenous Q8H    [START ON 11/8/2020] bisacodyl  10 mg Rectal Daily    [START ON 11/8/2020] baclofen  10 mg Oral QAM    divalproex  750 mg Oral BID    divalproex  500 mg Oral Nightly    lamoTRIgine  200 mg Oral BID    [START ON 11/8/2020] levothyroxine  50 mcg Oral Daily    [START ON 2020] propranolol  60 mg Oral Daily     Continuous Infusions:   sodium chloride       PRN Meds:.ketorolac, ondansetron  Allergies  is allergic to cefazolin; celontin [methsuximide]; duricef [cefadroxil]; simvastatin; and vicodin [hydrocodone-acetaminophen]. Family History  family history is not on file. Social History   reports that he has never smoked. He has never used smokeless tobacco. He reports that he does not drink alcohol or use drugs. Review of Systems:  General Denies any fever or chills  HEENT Denies any diplopia, tinnitus or vertigo  Resp Denies any shortness of breath, cough or wheezing  Cardiac Denies any chest pain, palpitations, claudication or edema  GI Denies any melena, hematochezia, hematemesis or pyrosis   Denies any frequency, urgency, hesitancy or incontinence  Heme Denies bruising or bleeding easily  Endocrine Denies any history of diabetes or thyroid disease  Neuro Denies any focal motor or sensory deficits  OBJECTIVE:   CURRENT VITALS:  oral temperature is 97.7 °F (36.5 °C). His blood pressure is 157/91 (abnormal) and his pulse is 85. His respiration is 16 and oxygen saturation is 97%. Temperature Range (24h):Temp: 97.7 °F (36.5 °C) Temp  Av.5 °F (36.4 °C)  Min: 97.3 °F (36.3 °C)  Max: 97.7 °F (36.5 °C)  BP Range (40H): Systolic (49CDX), DFA:011 , Min:143 , OAB:969     Diastolic (07GAW), EUZ:20, Min:81, Max:91    Pulse Range (24h): Pulse  Av  Min: 83  Max: 85  Respiration Range (24h): Resp  Av  Min: 16  Max: 18  Current Pulse Ox (24h):  SpO2: 97 %  Pulse Ox Range (24h):  SpO2  Av.8 %  Min: 97 %  Max: 100 %  Oxygen Amount and Delivery:    CONSTITUTIONAL: Alert and oriented times 3, no acute distress and cooperative to examination with proper mood and affect. SKIN: Skin color, texture, turgor normal. No rashes or lesions.   No palpable skin or subcutaneous masses  LYMPH: no cervical nodes, no inguinal nodes  HEENT: Head is normocephalic, atraumatic. EOMI, PERRLA. NECK: Supple, symmetrical, trachea midline, no adenopathy, thyroid symmetric, not enlarged and no tenderness, skin normal.  CHEST/LUNGS: chest symmetric with normal A/P diameter, normal respiratory rate and rhythm, lungs clear to auscultation without wheezes, rales or rhonchi. No accessory muscle use. CARDIOVASCULAR: Heart sounds are normal.  Regular rate and rhythm without murmur. Carotid and femoral pulses 2+/4 and equal bilaterally. ABDOMEN: Normal shape. No and Laparoscopic scar(s) present. Normal bowel sounds. No bruits. soft, nondistended, no masses or organomegaly. no evidence of hernia. Percussion: Normal without hepatosplenomegally. Tenderness: absent. : Testes descended bilaterally with no tenderness or swelling, penis normal, no evidence of inguinal hernias  RECTAL: declined by patient  NEUROLOGIC: There are no focalizing motor or sensory deficits. CN II-XII are grossly intact. Lorean Snare EXTREMITIES: no cyanosis, no clubbing and no edema. Normal gait. Psychiatric: Mood and affect are normal, judgment normal  LABS:     Recent Labs     11/07/20  1602 11/07/20  1632 11/07/20  1846   WBC 8.2  --   --    HGB 14.3  --   --    HCT 42.8  --   --      --   --      --   --    K 4.7  --   --      --   --    CO2 27  --   --    BUN 20  --   --    CREATININE 0.92  --   --    MG 2.0  --   --    CALCIUM 10.4*  --   --    INR  --   --  1.0   AST 10  --   --    ALT <5  --   --    BILITOT 0.6  --   --    LIPASE 23  --   --    LACTA 1.4  --   --    NITRU  --  Negative  --    COLORU  --  Yellow  --    BACTERIA  --  MODERATE*  --      RADIOLOGY:   I have personally reviewed the following films:  CT scan abd/pelvis: There is a small bowel obstruction.  There are fluid-filled distended loops of proximal small bowel measuring up to 5.3 cm with a possible subtle transition in the lower abdomen to the left of midline  Electronically signed by Alexandro Bumpers, MD on 11/8/2020 at 2:28 PM

## 2020-11-08 NOTE — CONSULTS
Consult Note    Reason for Consult: Medical management    Requesting Physician:  Waylon Alan MD    HISTORY OF PRESENT ILLNESS:    Patient being admitted for treatment of small bowel obstruction. Small bowel obstruction will be handled by general surgery. We were consulted for medical management. Patient arrived to the ED after several episodes of vomiting at a skilled facility where patient resides. History of present illness is limited as patient is not a good historian due to intellectual deficits. Patient does tell me right now he is not in pain and demands water. I attempted to explain to patient that he cannot have water at this time except when he has his medications he can have a small amount. I explained to him that we need to take care of a problem with his belly before he can have water again. Patient is very angry about this and does not seem to comprehend this need. Patient's past medical history for which he is medicated for  includes epilepsy, hypertension, hyperlipidemia. Patient currently sitting in position of comfort with no signs or symptoms of distress. Respirations are even and unlabored. Skin is warm dry and pink. No guarding or grimacing. Past Medical History:   Diagnosis Date    CKD (chronic kidney disease)     Diabetes mellitus ([facility] [facility]     Has a tremor     Hyperlipidemia     Hypokalemia     Intellectual disability     Kidney stone     Lennox-Gastaut syndrome (HCC)     Paralytic ileus ([facility] [address]     Thyroid disease     Venous thrombosis and embolism        Past Surgical History:   Procedure Laterality Date    LITHOTRIPSY  07/30/2020       Prior to Admission medications    Medication Sig Start Date End Date Taking? Authorizing Provider   gabapentin (NEURONTIN) 100 MG capsule Take 1 capsule by mouth 3 times daily for 30 days.  11/4/20 12/4/20  Guido Hill MD   potassium chloride 20 MEQ/15ML (10%) oral solution Take 40 mEq by mouth 3 times daily    Historical Provider, MD   ferrous sulfate (IRON 325) 325 (65 Fe) MG tablet Take 1 tablet by mouth 2 times daily (with meals) 8/3/20   Suzanne Meyers MD   baclofen (LIORESAL) 10 MG tablet Take 10 mg by mouth every morning    Historical Provider, MD   divalproex (DEPAKOTE ER) 250 MG extended release tablet Take 750 mg by mouth 2 times daily    Historical Provider, MD   divalproex (DEPAKOTE ER) 500 MG extended release tablet Take 500 mg by mouth nightly    Historical Provider, MD   Bisacodyl (DULCOLAX RE) Place 1 Units rectally daily as needed (constipation)    Historical Provider, MD   Sodium Phosphates (FLEET) 7-19 GM/118ML Place 1 enema rectally daily as needed (constipation)    Historical Provider, MD   lamoTRIgine (LAMICTAL) 200 MG tablet Take 200 mg by mouth 2 times daily    Historical Provider, MD   magnesium hydroxide (MILK OF MAGNESIA) 400 MG/5ML suspension Take 30 mLs by mouth daily as needed for Constipation    Historical Provider, MD   polyethylene glycol (GLYCOLAX) 17 g packet Take 17 g by mouth daily as needed for Constipation    Historical Provider, MD   propranolol (INDERAL LA) 60 MG extended release capsule Take 60 mg by mouth daily    Historical Provider, MD   levothyroxine (SYNTHROID) 50 MCG tablet Take 50 mcg by mouth Daily    Historical Provider, MD       Scheduled Meds:   metoclopramide  5 mg Intravenous Q8H    bisacodyl  10 mg Rectal Daily    baclofen  10 mg Oral QAM    divalproex  750 mg Oral BID    divalproex  500 mg Oral Nightly    lamoTRIgine  200 mg Oral BID    levothyroxine  50 mcg Oral Daily    propranolol  60 mg Oral Daily     Continuous Infusions:   sodium chloride 100 mL/hr at 11/07/20 2236     PRN Meds:ketorolac, ondansetron    Allergies   Allergen Reactions    Cefazolin     Celontin [Methsuximide]     Duricef [Cefadroxil]     Simvastatin     Vicodin [Hydrocodone-Acetaminophen]        Social History     Socioeconomic History    Marital status: Single     Spouse name: Not on file    Number of children: Not on file    Years of education: Not on file    Highest education level: Not on file   Occupational History    Not on file   Social Needs    Financial resource strain: Not on file    Food insecurity     Worry: Not on file     Inability: Not on file    Transportation needs     Medical: Not on file     Non-medical: Not on file   Tobacco Use    Smoking status: Never Smoker    Smokeless tobacco: Never Used   Substance and Sexual Activity    Alcohol use: Never     Frequency: Never    Drug use: Never    Sexual activity: Not Currently   Lifestyle    Physical activity     Days per week: Not on file     Minutes per session: Not on file    Stress: Not on file   Relationships    Social connections     Talks on phone: Not on file     Gets together: Not on file     Attends Congregation service: Not on file     Active member of club or organization: Not on file     Attends meetings of clubs or organizations: Not on file     Relationship status: Not on file    Intimate partner violence     Fear of current or ex partner: Not on file     Emotionally abused: Not on file     Physically abused: Not on file     Forced sexual activity: Not on file   Other Topics Concern    Not on file   Social History Narrative    Not on file       History reviewed. No pertinent family history. Review Of Systems:   Review of Systems   Unable to perform ROS: Psychiatric disorder (ROS limited due to intellectual disability.)   Respiratory: Negative for shortness of breath. Gastrointestinal: Negative for abdominal pain. ROS as noted in HPI, 12 point ROS reviewed and otherwise negative. Physical Exam:  Vitals:    11/07/20 2054   BP: (!) 157/91   Pulse: 85   Resp: 16   Temp: 97.7 °F (36.5 °C)   TempSrc: Oral   SpO2: 97%       Physical Exam   Constitutional: He appears well-developed and well-nourished. HENT:   Head: Atraumatic.    Mouth/Throat: Oropharynx is clear and moist.   Eyes: Pupils are equal, round, and reactive to light. Cardiovascular: Normal rate and regular rhythm. Pulmonary/Chest: Effort normal and breath sounds normal.   Abdominal: Soft. Bowel sounds are normal. He exhibits no distension. There is no abdominal tenderness. There is no guarding. Musculoskeletal: Normal range of motion. Neurological: He is alert. At baseline   Skin: Skin is warm and dry. No rash noted. Psychiatric: He has a normal mood and affect. Nursing note and vitals reviewed.        Labs:  Recent Results (from the past 72 hour(s))   Comprehensive Metabolic Panel    Collection Time: 11/07/20  4:02 PM   Result Value Ref Range    Sodium 139 135 - 144 mEq/L    Potassium 4.7 3.4 - 4.9 mEq/L    Chloride 101 95 - 107 mEq/L    CO2 27 20 - 31 mEq/L    Anion Gap 11 9 - 15 mEq/L    Glucose 126 (H) 70 - 99 mg/dL    BUN 20 6 - 20 mg/dL    CREATININE 0.92 0.70 - 1.20 mg/dL    GFR Non-African American >60.0 >60    GFR  >60.0 >60    Calcium 10.4 (H) 8.5 - 9.9 mg/dL    Total Protein 8.1 (H) 6.3 - 8.0 g/dL    Alb 4.5 3.5 - 4.6 g/dL    Total Bilirubin 0.6 0.2 - 0.7 mg/dL    Alkaline Phosphatase 68 35 - 104 U/L    ALT <5 0 - 41 U/L    AST 10 0 - 40 U/L    Globulin 3.6 (H) 2.3 - 3.5 g/dL   CBC Auto Differential    Collection Time: 11/07/20  4:02 PM   Result Value Ref Range    WBC 8.2 4.8 - 10.8 K/uL    RBC 4.85 4.70 - 6.10 M/uL    Hemoglobin 14.3 14.0 - 18.0 g/dL    Hematocrit 42.8 42.0 - 52.0 %    MCV 88.1 80.0 - 100.0 fL    MCH 29.4 27.0 - 31.3 pg    MCHC 33.3 33.0 - 37.0 %    RDW 16.8 (H) 11.5 - 14.5 %    Platelets 215 590 - 966 K/uL    Neutrophils % 74.6 %    Lymphocytes % 14.7 %    Monocytes % 9.1 %    Eosinophils % 1.3 %    Basophils % 0.3 %    Neutrophils Absolute 6.1 1.4 - 6.5 K/uL    Lymphocytes Absolute 1.2 1.0 - 4.8 K/uL    Monocytes Absolute 0.8 0.2 - 0.8 K/uL    Eosinophils Absolute 0.1 0.0 - 0.7 K/uL    Basophils Absolute 0.0 0.0 - 0.2 K/uL   Lactic Acid, Plasma    Collection Time: 11/07/20  4:02 PM   Result Value Ref Range    Lactic Acid 1.4 0.5 - 2.2 mmol/L   Lipase    Collection Time: 11/07/20  4:02 PM   Result Value Ref Range    Lipase 23 12 - 95 U/L   Magnesium    Collection Time: 11/07/20  4:02 PM   Result Value Ref Range    Magnesium 2.0 1.7 - 2.4 mg/dL   Urine Reflex to Culture    Collection Time: 11/07/20  4:32 PM    Specimen: Urine, clean catch   Result Value Ref Range    Color, UA Yellow Straw/Yellow    Clarity, UA Cloudy Clear    Glucose, Ur Negative Negative mg/dL    Bilirubin Urine Small Negative    Ketones, Urine 40 (A) Negative mg/dL    Specific Gravity, UA 1.025 1.005 - 1.030    Blood, Urine Moderate Negative    pH, UA 5.5 5.0 - 9.0    Protein,  (A) Negative mg/dL    Urobilinogen, Urine 1.0 <2.0 E.U./dL    Nitrite, Urine Negative Negative    Leukocyte Esterase, Urine Small Negative    Urine Reflex to Culture Yes    Microscopic Urinalysis    Collection Time: 11/07/20  4:32 PM   Result Value Ref Range    Hyaline Casts, UA 0-1 0 - 5 /LPF    Mucus, UA Present None Seen /LPF    WBC, UA >100 (A) 0 - 5 /HPF    RBC, UA 10-20 (A) 0 - 2 /HPF    Epithelial Cells, UA 0-2 /HPF    Bacteria, UA MODERATE (A) Negative /HPF    Amorphous, UA 1+    Protime-INR    Collection Time: 11/07/20  6:46 PM   Result Value Ref Range    Protime 13.4 12.3 - 14.9 sec    INR 1.0    APTT    Collection Time: 11/07/20  6:46 PM   Result Value Ref Range    aPTT 34.1 24.4 - 36.8 sec       Data:    Ct Abdomen Pelvis W Iv Contrast Additional Contrast? None    Result Date: 11/7/2020  EXAMINATION: CT ABDOMEN PELVIS W IV CONTRAST HISTORY:  Bilious vomiting  COMPARISON: None TECHNIQUE: Contiguous axial CT sections of the abdomen and pelvis. Imaging was obtained after IV contrast administration. .  All CT scans at this facility use dose modulation, iterative reconstruction, and/or weight based dosing when appropriate to reduce radiation dose to as low as reasonably achievable. FINDINGS  Lung bases: There is a trace right pleural effusion.  There is scarring of the right costophrenic recess. Liver: The liver is normal in size and attenuation without  focal lesions. There are no focal solid or cystic lesions. There is no intra or extrahepatic bile duct dilatation. Gallbladder: No calcified gallstones. Normal gallbladder wall. No pericholecystic fluid. Spleen: There are no focal lesions or calcifications in the spleen. There is no splenomegaly  Pancreas: The pancreas is normal in size and attenuation without focal lesions or dilatation of the pancreatic duct. Kidneys: There are no solid renal lesions. There are areas of cortical thinning and scarring which may be secondary to prior history of infection, inflammation. There are prompt bilateral nephrograms after IV contrast administration with prompt excretion  into nondilated collecting systems. There is no hydroureter. Adrenal glands are negative. Bowel: There are numerous dilated loops of small bowel measuring up to 5.3 cm in greatest diameter. There is a possible sono transition in the lower abdomen to the left of midline. The terminal ileum is decompressed. The colon demonstrates copious fecal material throughout, constipation. Appendix: There  is no CT evidence for appendicitis. Nodes: No lymphadenopathy. Aorta: No aneurysm  Peritoneum: No free fluid or free air. Pelvis: There are no solid or cystic lesions. The urinary bladder is empty and demonstrates bowel wall thickening measuring up to 17 mm in greatest diameter. There are ossifications of the prostate without enlargement. Abdominal wall: The abdominal wall is intact. Bones : There are no acute osseous changes. Soft tissues: The soft tissues are unremarkable. There is a small bowel obstruction. There are fluid-filled distended loops of proximal small bowel measuring up to 5.3 cm with a possible subtle transition in the lower abdomen to the left of midline.          Assessment/Plan:    Principal Problem:  1)  Small bowel obstruction [facility]): Several episodes of vomiting x1 day. Abdomen pelvis CT revealed small bowel obstruction. Patient will be seen by general surgery. Patient n.p.o. except sips with meds. Active Problems:  2) Epileptic seizure [facility]): Patient on home medications to control. We we will resume home meds      3) Hypothyroidism: Patient on home medications to control. We we will resume home meds    4) HTN (hypertension): Patient on home medications to control. We we will resume home meds. We will monitor blood pressure regularly    5) Chronic pain: Patient on home medications to control.   We we will resume home meds      Electronically signed by Lala Koyanagi, RN, NP on 11/8/20 at 12:51 AM KIZZY Sorenson DO - supervising physician

## 2020-11-08 NOTE — PROGRESS NOTES
Progress Note  Date:2020       Room:W485/W485-01  Patient Name:Sonny Otoole     YOB: 1970     Age:50 y.o. Subjective    Subjective:  Symptoms:  He reports malaise. No shortness of breath, cough, chest pain, weakness, headache, chest pressure, anorexia, diarrhea or anxiety. Review of Systems   Respiratory: Negative for cough and shortness of breath. Cardiovascular: Negative for chest pain. Gastrointestinal: Negative for anorexia and diarrhea. Neurological: Negative for weakness. Objective         Vitals Last 24 Hours:  TEMPERATURE:  Temp  Av.5 °F (36.4 °C)  Min: 97.3 °F (36.3 °C)  Max: 97.7 °F (36.5 °C)  RESPIRATIONS RANGE: Resp  Av  Min: 16  Max: 18  PULSE OXIMETRY RANGE: SpO2  Av.8 %  Min: 97 %  Max: 100 %  PULSE RANGE: Pulse  Av  Min: 83  Max: 85  BLOOD PRESSURE RANGE: Systolic (11TQI), CII:764 , Min:143 , DQP:180   ; Diastolic (49VPI), RNN:21, Min:81, Max:91    I/O (24Hr): No intake or output data in the 24 hours ending 20 1310  Objective:  General Appearance:  Comfortable, well-appearing and in no acute distress. Vital signs: (most recent): Blood pressure (!) 157/91, pulse 85, temperature 97.7 °F (36.5 °C), temperature source Oral, resp. rate 16, SpO2 97 %. HEENT: Normal HEENT exam.    Lungs:  Normal effort. Heart: Normal rate. Regular rhythm. S1 normal.    Abdomen: Abdomen is soft. Bowel sounds are normal.   There is no epigastric area tenderness. Pulses: Distal pulses are intact. Neurological: Patient is alert. Pupils:  Pupils are equal, round, and reactive to light. Skin:  Warm and dry.       Labs/Imaging/Diagnostics    Labs:  CBC:  Recent Labs     20  1602   WBC 8.2   RBC 4.85   HGB 14.3   HCT 42.8   MCV 88.1   RDW 16.8*        CHEMISTRIES:  Recent Labs     20  1602      K 4.7      CO2 27   BUN 20   CREATININE 0.92   GLUCOSE 126*   MG 2.0     PT/INR:  Recent Labs 11/07/20  1846   PROTIME 13.4   INR 1.0     APTT:  Recent Labs     11/07/20  1846   APTT 34.1     LIVER PROFILE:  Recent Labs     11/07/20  1602   AST 10   ALT <5   BILITOT 0.6   ALKPHOS 68       Imaging Last 24 Hours:  Ct Abdomen Pelvis W Iv Contrast Additional Contrast? None    Result Date: 11/7/2020  EXAMINATION: CT ABDOMEN PELVIS W IV CONTRAST HISTORY:  Bilious vomiting  COMPARISON: None TECHNIQUE: Contiguous axial CT sections of the abdomen and pelvis. Imaging was obtained after IV contrast administration. .  All CT scans at this facility use dose modulation, iterative reconstruction, and/or weight based dosing when appropriate to reduce radiation dose to as low as reasonably achievable. FINDINGS  Lung bases: There is a trace right pleural effusion. There is scarring of the right costophrenic recess. Liver: The liver is normal in size and attenuation without  focal lesions. There are no focal solid or cystic lesions. There is no intra or extrahepatic bile duct dilatation. Gallbladder: No calcified gallstones. Normal gallbladder wall. No pericholecystic fluid. Spleen: There are no focal lesions or calcifications in the spleen. There is no splenomegaly  Pancreas: The pancreas is normal in size and attenuation without focal lesions or dilatation of the pancreatic duct. Kidneys: There are no solid renal lesions. There are areas of cortical thinning and scarring which may be secondary to prior history of infection, inflammation. There are prompt bilateral nephrograms after IV contrast administration with prompt excretion  into nondilated collecting systems. There is no hydroureter. Adrenal glands are negative. Bowel: There are numerous dilated loops of small bowel measuring up to 5.3 cm in greatest diameter. There is a possible sono transition in the lower abdomen to the left of midline. The terminal ileum is decompressed. The colon demonstrates copious fecal material throughout, constipation.    Appendix: There  is no CT evidence for appendicitis. Nodes: No lymphadenopathy. Aorta: No aneurysm  Peritoneum: No free fluid or free air. Pelvis: There are no solid or cystic lesions. The urinary bladder is empty and demonstrates bowel wall thickening measuring up to 17 mm in greatest diameter. There are ossifications of the prostate without enlargement. Abdominal wall: The abdominal wall is intact. Bones : There are no acute osseous changes. Soft tissues: The soft tissues are unremarkable. There is a small bowel obstruction. There are fluid-filled distended loops of proximal small bowel measuring up to 5.3 cm with a possible subtle transition in the lower abdomen to the left of midline. Assessment//Plan           Hospital Problems           Last Modified POA    * (Principal) Small bowel obstruction ([address] 11/8/2020 Yes    Epileptic seizure ([address] 11/8/2020 Yes    Overview Signed 11/8/2020 12:16 AM by Shanita Gregory RN, NP     Patient has history of epilepsy with Earl Haagensen syndome [last seizure 2 weeks back, also has non-functional VNS ]. His usual seizure description is that he drops back and he goes over like a \"tree\".  If not seated he falls without warning.     -Continue home dose of Lamotrigine 200 mg BID, Divalproex Dr 750 TID and neurontin 100 TID  -Seizure precautions         Hypothyroidism 11/8/2020 Yes    HTN (hypertension) 11/8/2020 Yes    Chronic pain 11/8/2020 Yes        Assessment & Plan  1) SBO  C/w NPO  Advance diet as tolerated  2) Seizure s  C/w home emds  3) HTn stable  4) chronic pain controlled    Electronically signed by Denise Sevilla MD on 11/8/20 at 1:10 PM EST

## 2020-11-09 ENCOUNTER — APPOINTMENT (OUTPATIENT)
Dept: GENERAL RADIOLOGY | Age: 50
DRG: 389 | End: 2020-11-09
Attending: SURGERY
Payer: MEDICARE

## 2020-11-09 PROCEDURE — 6370000000 HC RX 637 (ALT 250 FOR IP): Performed by: INTERNAL MEDICINE

## 2020-11-09 PROCEDURE — 6360000002 HC RX W HCPCS: Performed by: SURGERY

## 2020-11-09 PROCEDURE — 2580000003 HC RX 258: Performed by: SURGERY

## 2020-11-09 PROCEDURE — 99232 SBSQ HOSP IP/OBS MODERATE 35: CPT | Performed by: SURGERY

## 2020-11-09 PROCEDURE — 1210000000 HC MED SURG R&B

## 2020-11-09 PROCEDURE — 6370000000 HC RX 637 (ALT 250 FOR IP): Performed by: SURGERY

## 2020-11-09 RX ADMIN — LAMOTRIGINE 200 MG: 200 TABLET ORAL at 11:00

## 2020-11-09 RX ADMIN — LAMOTRIGINE 200 MG: 200 TABLET ORAL at 21:21

## 2020-11-09 RX ADMIN — DIVALPROEX SODIUM 750 MG: 500 TABLET, FILM COATED, EXTENDED RELEASE ORAL at 11:00

## 2020-11-09 RX ADMIN — BACLOFEN 10 MG: 10 TABLET ORAL at 11:00

## 2020-11-09 RX ADMIN — DIVALPROEX SODIUM 500 MG: 500 TABLET, EXTENDED RELEASE ORAL at 21:21

## 2020-11-09 RX ADMIN — PROPRANOLOL HYDROCHLORIDE 60 MG: 60 CAPSULE, EXTENDED RELEASE ORAL at 11:00

## 2020-11-09 RX ADMIN — DIVALPROEX SODIUM 750 MG: 500 TABLET, FILM COATED, EXTENDED RELEASE ORAL at 17:10

## 2020-11-09 RX ADMIN — METOCLOPRAMIDE HYDROCHLORIDE 5 MG: 5 INJECTION INTRAMUSCULAR; INTRAVENOUS at 21:21

## 2020-11-09 RX ADMIN — METOCLOPRAMIDE HYDROCHLORIDE 5 MG: 5 INJECTION INTRAMUSCULAR; INTRAVENOUS at 11:01

## 2020-11-09 RX ADMIN — LEVOTHYROXINE SODIUM 50 MCG: 0.05 TABLET ORAL at 05:52

## 2020-11-09 RX ADMIN — METOCLOPRAMIDE HYDROCHLORIDE 5 MG: 5 INJECTION INTRAMUSCULAR; INTRAVENOUS at 02:50

## 2020-11-09 RX ADMIN — SODIUM CHLORIDE: 9 INJECTION, SOLUTION INTRAVENOUS at 21:21

## 2020-11-09 ASSESSMENT — PAIN - FUNCTIONAL ASSESSMENT: PAIN_FUNCTIONAL_ASSESSMENT: 0-10

## 2020-11-09 ASSESSMENT — ENCOUNTER SYMPTOMS
SHORTNESS OF BREATH: 0
DIARRHEA: 0
COUGH: 0

## 2020-11-09 NOTE — PROGRESS NOTES
Assumed care of patient at midnight. Pt resting, denies pain. A/O but frustrated and slow to respond. BS+ all 4 quads, denies nausea, no vomiting. Abdomen mild distention, denies pain.

## 2020-11-09 NOTE — ACP (ADVANCE CARE PLANNING)
Phone call made to mother/Guardian of pt. She is the decision maker for pt. Reviewed previous ACP notes and there are no changes to be made to the note.

## 2020-11-09 NOTE — CARE COORDINATION
Pt is in isolation and his mother is his Guardian. Phone call made to mother of pt. She shared that pt has been residing at [facility] for about 2 1/2 weeks. Prior to this pt was at [facility] after living in the community in a supported living apartment. He is now LTC at [facility] with the plan to return. Pt is generally wheelchair bound at this time and receives all care from the staff. Mom is aware that pt has been refusing some treatments, but stated it has been difficult as she has not been able to visit with him and assist with follow through since the pandemic. Plan for return to [facility]. Phone call made to [facility] and spoke to nurse Derik Saul as his nurse Amrit Zeng was not available. She will check about protocol for taking pt back, but said pt could potentially be isolated there.

## 2020-11-09 NOTE — PROGRESS NOTES
Progress Note  Date:2020       YBTR:[id number]  Patient Name:Sonny Otoole     YOB: 1970     Age:50 y.o. Pt was seen and evaluated, no overnight events    Subjective    Subjective:  Symptoms:  He reports malaise. No shortness of breath, cough, chest pain, weakness, headache, chest pressure, anorexia, diarrhea or anxiety. Review of Systems   Respiratory: Negative for cough and shortness of breath. Cardiovascular: Negative for chest pain. Gastrointestinal: Negative for anorexia and diarrhea. Neurological: Negative for weakness. Objective         Vitals Last 24 Hours:  TEMPERATURE:  Temp  Av °F (37.2 °C)  Min: 99 °F (37.2 °C)  Max: 99 °F (37.2 °C)  RESPIRATIONS RANGE: No data recorded  PULSE OXIMETRY RANGE: SpO2  Av.3 %  Min: 91 %  Max: 100 %  PULSE RANGE: Pulse  Av.7  Min: 66  Max: 67  BLOOD PRESSURE RANGE: Systolic (70SDX), AOK:727 , Min:106 , XQB:835   ; Diastolic (24QTK), KLAUS:60, Min:51, Max:55    I/O (24Hr): Intake/Output Summary (Last 24 hours) at 2020 1106  Last data filed at 2020 0757  Gross per 24 hour   Intake 3172 ml   Output --   Net 3172 ml     Objective:  General Appearance:  Comfortable, well-appearing and in no acute distress. Vital signs: (most recent): Blood pressure (!) 109/55, pulse 67, temperature 99 °F (37.2 °C), resp. rate 16, SpO2 98 %. HEENT: Normal HEENT exam.    Lungs:  Normal effort. Heart: Normal rate. Regular rhythm. S1 normal.    Abdomen: Abdomen is soft. Bowel sounds are normal.   There is no epigastric area tenderness. Pulses: Distal pulses are intact. Neurological: Patient is alert. Pupils:  Pupils are equal, round, and reactive to light. Skin:  Warm and dry.       Labs/Imaging/Diagnostics    Labs:  CBC:  Recent Labs     20  1602   WBC 8.2   RBC 4.85   HGB 14.3   HCT 42.8   MCV 88.1   RDW 16.8*        CHEMISTRIES:  Recent Labs     20  1602      K 4.7    CO2 27   BUN 20   CREATININE 0.92   GLUCOSE 126*   MG 2.0     PT/INR:  Recent Labs     11/07/20  1846   PROTIME 13.4   INR 1.0     APTT:  Recent Labs     11/07/20  1846   APTT 34.1     LIVER PROFILE:  Recent Labs     11/07/20  1602   AST 10   ALT <5   BILITOT 0.6   ALKPHOS 68       Imaging Last 24 Hours:  Ct Abdomen Pelvis W Iv Contrast Additional Contrast? None    Result Date: 11/7/2020  EXAMINATION: CT ABDOMEN PELVIS W IV CONTRAST HISTORY:  Bilious vomiting  COMPARISON: None TECHNIQUE: Contiguous axial CT sections of the abdomen and pelvis. Imaging was obtained after IV contrast administration. .  All CT scans at this facility use dose modulation, iterative reconstruction, and/or weight based dosing when appropriate to reduce radiation dose to as low as reasonably achievable. FINDINGS  Lung bases: There is a trace right pleural effusion. There is scarring of the right costophrenic recess. Liver: The liver is normal in size and attenuation without  focal lesions. There are no focal solid or cystic lesions. There is no intra or extrahepatic bile duct dilatation. Gallbladder: No calcified gallstones. Normal gallbladder wall. No pericholecystic fluid. Spleen: There are no focal lesions or calcifications in the spleen. There is no splenomegaly  Pancreas: The pancreas is normal in size and attenuation without focal lesions or dilatation of the pancreatic duct. Kidneys: There are no solid renal lesions. There are areas of cortical thinning and scarring which may be secondary to prior history of infection, inflammation. There are prompt bilateral nephrograms after IV contrast administration with prompt excretion  into nondilated collecting systems. There is no hydroureter. Adrenal glands are negative. Bowel: There are numerous dilated loops of small bowel measuring up to 5.3 cm in greatest diameter. There is a possible sono transition in the lower abdomen to the left of midline.  The terminal ileum is decompressed. The colon demonstrates copious fecal material throughout, constipation. Appendix: There  is no CT evidence for appendicitis. Nodes: No lymphadenopathy. Aorta: No aneurysm  Peritoneum: No free fluid or free air. Pelvis: There are no solid or cystic lesions. The urinary bladder is empty and demonstrates bowel wall thickening measuring up to 17 mm in greatest diameter. There are ossifications of the prostate without enlargement. Abdominal wall: The abdominal wall is intact. Bones : There are no acute osseous changes. Soft tissues: The soft tissues are unremarkable. There is a small bowel obstruction. There are fluid-filled distended loops of proximal small bowel measuring up to 5.3 cm with a possible subtle transition in the lower abdomen to the left of midline. Assessment//Plan           Hospital Problems           Last Modified POA    * (Principal) Small bowel obstruction ([address] 11/8/2020 Yes    Epileptic seizure ([address] 11/8/2020 Yes    Overview Signed 11/8/2020 12:16 AM by Harris Toth RN, NP     Patient has history of epilepsy with Hope Serge syndome [last seizure 2 weeks back, also has non-functional VNS ]. His usual seizure description is that he drops back and he goes over like a \"tree\".  If not seated he falls without warning.     -Continue home dose of Lamotrigine 200 mg BID, Divalproex Dr 750 TID and neurontin 100 TID  -Seizure precautions         Hypothyroidism 11/8/2020 Yes    HTN (hypertension) 11/8/2020 Yes    Chronic pain 11/8/2020 Yes        Assessment & Plan    1) SBO  C/w NPO  Advance diet as tolerated  2) Seizure s  C/w home emds  3) HTn stable  4) chronic pain controlled  Pt can be discharged once ok with general sugery  No overnight events  Electronically signed by Rosa Browning MD on 11/8/20 at 1:10 PM EST

## 2020-11-09 NOTE — PROGRESS NOTES
Pt Name: Clayton Presley  Medical Record Number: [id number]  Date of Birth 1970   Admit date 2020  8:35 PM  Today's Date: 2020     ASSESSMENT  1. Hospital day # 2  2. Refusing any testing including small bowel follow-through and Covid testing  3. Uncooperative    PLAN  1. Clear liquid diet  2. Calling his nursing home to let him know the situation here    SUBJECTIVE  Chief complaint: Patient wants to go home  Afebrile, vital signs are stable. He denies any nausea or vomiting, has passed flatus and had a bowel movement. He is tolerating a Diet NPO Effective Now Exceptions are: Sips with Meds. His pain is well controlled on current medications. He has been ambulating in the halls. He went down for small bowel follow-through and refused to drink the liquid and is refusing to be tested for Covid. I tried to rationalize with him and he just keeps saying he wants to go home.      has a past medical history of CKD (chronic kidney disease), Diabetes mellitus ([address] Has a tremor, Hyperlipidemia, Hypokalemia, Intellectual disability, Kidney stone, Lennox-Gastaut syndrome ([address] Paralytic ileus ([address] Thyroid disease, and Venous thrombosis and embolism. CURRENT MEDS  Scheduled Meds:   divalproex  750 mg Oral BID WC    metoclopramide  5 mg Intravenous Q8H    bisacodyl  10 mg Rectal Daily    baclofen  10 mg Oral QAM    divalproex  500 mg Oral Nightly    lamoTRIgine  200 mg Oral BID    levothyroxine  50 mcg Oral Daily    propranolol  60 mg Oral Daily     Continuous Infusions:   sodium chloride 100 mL/hr at 20 1810     PRN Meds:.ketorolac, ondansetron    OBJECTIVE  CURRENT VITALS:  temperature is 99 °F (37.2 °C). His blood pressure is 109/55 (abnormal) and his pulse is 67. His respiration is 16 and oxygen saturation is 98%.    Temperature Range (24h):Temp: 99 °F (37.2 °C) Temp  Av °F (37.2 °C)  Min: 99 °F (37.2 °C)  Max: 99 °F (37.2 °C)  BP Range (71X): Systolic (23XPR), YVQ:716 , Min:106 , PSO:267     Diastolic (67ANM), NEV:02, Min:51, Max:55    Pulse Range (24h): Pulse  Av.7  Min: 66  Max: 67  Respiration Range (24h): No data recorded    GENERAL: alert, no distress, uncooperative  LUNGS: clear to ausculation, without wheezes, rales or rhonci, no respiratory distress  HEART: normal rate and regular rhythm, no murmurs  ABDOMEN: soft, non-tender, non-distended, bowel sounds present in all 4 quadrants and no guarding or peritoneal signs, no hernias  EXTERMITY: no cyanosis, clubbing or edema, gait normal  In: 3172 [I.V.:3172]  Out: -   Date 20 0000 - 20 2359   Shift 3568-2252 8179-5208 1894-1084 24 Hour Total   INTAKE   I.V. 1250   1250   Shift Total 1250   1250   OUTPUT   Shift Total       Weight (kg)           LABS  Recent Labs     20  1602   WBC 8.2   HGB 14.3   HCT 42.8         K 4.7      CO2 27   BUN 20   CREATININE 0.92   MG 2.0   CALCIUM 10.4*      Recent Labs     20  1846   INR 1.0     Recent Labs     20  1602   AST 10   ALT <5   BILITOT 0.6   LIPASE 23   LACTA 1.4       RADIOLOGY  Ct Abdomen Pelvis W Iv Contrast Additional Contrast? None    Result Date: 2020  EXAMINATION: CT ABDOMEN PELVIS W IV CONTRAST HISTORY:  Bilious vomiting  COMPARISON: None TECHNIQUE: Contiguous axial CT sections of the abdomen and pelvis. Imaging was obtained after IV contrast administration. .  All CT scans at this facility use dose modulation, iterative reconstruction, and/or weight based dosing when appropriate to reduce radiation dose to as low as reasonably achievable. FINDINGS  Lung bases: There is a trace right pleural effusion. There is scarring of the right costophrenic recess. Liver: The liver is normal in size and attenuation without  focal lesions. There are no focal solid or cystic lesions. There is no intra or extrahepatic bile duct dilatation. Gallbladder: No calcified gallstones. Normal gallbladder wall. No pericholecystic fluid. Spleen: There are no focal lesions or calcifications in the spleen. There is no splenomegaly  Pancreas: The pancreas is normal in size and attenuation without focal lesions or dilatation of the pancreatic duct. Kidneys: There are no solid renal lesions. There are areas of cortical thinning and scarring which may be secondary to prior history of infection, inflammation. There are prompt bilateral nephrograms after IV contrast administration with prompt excretion  into nondilated collecting systems. There is no hydroureter. Adrenal glands are negative. Bowel: There are numerous dilated loops of small bowel measuring up to 5.3 cm in greatest diameter. There is a possible sono transition in the lower abdomen to the left of midline. The terminal ileum is decompressed. The colon demonstrates copious fecal material throughout, constipation. Appendix: There  is no CT evidence for appendicitis. Nodes: No lymphadenopathy. Aorta: No aneurysm  Peritoneum: No free fluid or free air. Pelvis: There are no solid or cystic lesions. The urinary bladder is empty and demonstrates bowel wall thickening measuring up to 17 mm in greatest diameter. There are ossifications of the prostate without enlargement. Abdominal wall: The abdominal wall is intact. Bones : There are no acute osseous changes. Soft tissues: The soft tissues are unremarkable. There is a small bowel obstruction. There are fluid-filled distended loops of proximal small bowel measuring up to 5.3 cm with a possible subtle transition in the lower abdomen to the left of midline.      Electronically signed by Brayan Smith MD on 11/9/2020 at 11:32 AM

## 2020-11-10 VITALS
RESPIRATION RATE: 16 BRPM | HEART RATE: 52 BPM | OXYGEN SATURATION: 94 % | TEMPERATURE: 98.2 F | SYSTOLIC BLOOD PRESSURE: 137 MMHG | DIASTOLIC BLOOD PRESSURE: 61 MMHG

## 2020-11-10 LAB
ORGANISM: ABNORMAL
URINE CULTURE, ROUTINE: ABNORMAL

## 2020-11-10 PROCEDURE — 6370000000 HC RX 637 (ALT 250 FOR IP): Performed by: INTERNAL MEDICINE

## 2020-11-10 PROCEDURE — 2580000003 HC RX 258: Performed by: SURGERY

## 2020-11-10 PROCEDURE — 99222 1ST HOSP IP/OBS MODERATE 55: CPT | Performed by: INTERNAL MEDICINE

## 2020-11-10 PROCEDURE — 6360000002 HC RX W HCPCS: Performed by: SURGERY

## 2020-11-10 PROCEDURE — 6370000000 HC RX 637 (ALT 250 FOR IP): Performed by: SURGERY

## 2020-11-10 RX ORDER — GRANULES FOR ORAL 3 G/1
3 POWDER ORAL ONCE
Status: COMPLETED | OUTPATIENT
Start: 2020-11-10 | End: 2020-11-10

## 2020-11-10 RX ADMIN — METOCLOPRAMIDE HYDROCHLORIDE 5 MG: 5 INJECTION INTRAMUSCULAR; INTRAVENOUS at 04:14

## 2020-11-10 RX ADMIN — FOSFOMYCIN TROMETHAMINE 1 PACKET: 3 POWDER ORAL at 16:23

## 2020-11-10 RX ADMIN — SODIUM CHLORIDE: 9 INJECTION, SOLUTION INTRAVENOUS at 05:11

## 2020-11-10 RX ADMIN — LEVOTHYROXINE SODIUM 50 MCG: 0.05 TABLET ORAL at 05:11

## 2020-11-10 ASSESSMENT — ENCOUNTER SYMPTOMS
COUGH: 0
SHORTNESS OF BREATH: 0
DIARRHEA: 0

## 2020-11-10 NOTE — PROGRESS NOTES
Progress Note  Date:11/10/2020       OGAS:[id number]  Patient Name:Sonny Otoole     YOB: 1970     Age:50 y.o. Pt was seen and evaluated, no overnight events    Subjective    Subjective:  Symptoms:  He reports malaise. No shortness of breath, cough, chest pain, weakness, headache, chest pressure, anorexia, diarrhea or anxiety. Review of Systems   Respiratory: Negative for cough and shortness of breath. Cardiovascular: Negative for chest pain. Gastrointestinal: Negative for anorexia and diarrhea. Neurological: Negative for weakness. Objective         Vitals Last 24 Hours:  TEMPERATURE:  Temp  Av.2 °F (36.8 °C)  Min: 98.1 °F (36.7 °C)  Max: 98.2 °F (36.8 °C)  RESPIRATIONS RANGE: Resp  Av  Min: 16  Max: 16  PULSE OXIMETRY RANGE: SpO2  Av %  Min: 94 %  Max: 94 %  PULSE RANGE: Pulse  Av.5  Min: 50  Max: 62  BLOOD PRESSURE RANGE: Systolic (70LWQ), JHH:597 , Min:118 , DAD:210   ; Diastolic (11FNK), ZII:82, Min:61, Max:111    I/O (24Hr): Intake/Output Summary (Last 24 hours) at 11/10/2020 1323  Last data filed at 11/10/2020 0949  Gross per 24 hour   Intake 3090 ml   Output --   Net 3090 ml     Objective:  General Appearance:  Comfortable, well-appearing and in no acute distress. Vital signs: (most recent): Blood pressure 137/61, pulse 52, temperature 98.2 °F (36.8 °C), temperature source Oral, resp. rate 16, SpO2 94 %. HEENT: Normal HEENT exam.    Lungs:  Normal effort. Heart: Normal rate. Regular rhythm. S1 normal.    Abdomen: Abdomen is soft. Bowel sounds are normal.   There is no epigastric area tenderness. Pulses: Distal pulses are intact. Neurological: Patient is alert. Pupils:  Pupils are equal, round, and reactive to light. Skin:  Warm and dry.       Labs/Imaging/Diagnostics    Labs:  CBC:  Recent Labs     20  1602   WBC 8.2   RBC 4.85   HGB 14.3   HCT 42.8   MCV 88.1   RDW 16.8*        CHEMISTRIES:  Recent Labs 11/07/20  1602      K 4.7      CO2 27   BUN 20   CREATININE 0.92   GLUCOSE 126*   MG 2.0     PT/INR:  Recent Labs     11/07/20  1846   PROTIME 13.4   INR 1.0     APTT:  Recent Labs     11/07/20  1846   APTT 34.1     LIVER PROFILE:  Recent Labs     11/07/20  1602   AST 10   ALT <5   BILITOT 0.6   ALKPHOS 68       Imaging Last 24 Hours:  Ct Abdomen Pelvis W Iv Contrast Additional Contrast? None    Result Date: 11/7/2020  EXAMINATION: CT ABDOMEN PELVIS W IV CONTRAST HISTORY:  Bilious vomiting  COMPARISON: None TECHNIQUE: Contiguous axial CT sections of the abdomen and pelvis. Imaging was obtained after IV contrast administration. .  All CT scans at this facility use dose modulation, iterative reconstruction, and/or weight based dosing when appropriate to reduce radiation dose to as low as reasonably achievable. FINDINGS  Lung bases: There is a trace right pleural effusion. There is scarring of the right costophrenic recess. Liver: The liver is normal in size and attenuation without  focal lesions. There are no focal solid or cystic lesions. There is no intra or extrahepatic bile duct dilatation. Gallbladder: No calcified gallstones. Normal gallbladder wall. No pericholecystic fluid. Spleen: There are no focal lesions or calcifications in the spleen. There is no splenomegaly  Pancreas: The pancreas is normal in size and attenuation without focal lesions or dilatation of the pancreatic duct. Kidneys: There are no solid renal lesions. There are areas of cortical thinning and scarring which may be secondary to prior history of infection, inflammation. There are prompt bilateral nephrograms after IV contrast administration with prompt excretion  into nondilated collecting systems. There is no hydroureter. Adrenal glands are negative. Bowel: There are numerous dilated loops of small bowel measuring up to 5.3 cm in greatest diameter.  There is a possible sono transition in the lower abdomen to the left of midline. The terminal ileum is decompressed. The colon demonstrates copious fecal material throughout, constipation. Appendix: There  is no CT evidence for appendicitis. Nodes: No lymphadenopathy. Aorta: No aneurysm  Peritoneum: No free fluid or free air. Pelvis: There are no solid or cystic lesions. The urinary bladder is empty and demonstrates bowel wall thickening measuring up to 17 mm in greatest diameter. There are ossifications of the prostate without enlargement. Abdominal wall: The abdominal wall is intact. Bones : There are no acute osseous changes. Soft tissues: The soft tissues are unremarkable. There is a small bowel obstruction. There are fluid-filled distended loops of proximal small bowel measuring up to 5.3 cm with a possible subtle transition in the lower abdomen to the left of midline. Assessment//Plan           Hospital Problems           Last Modified POA    * (Principal) Small bowel obstruction ([address] 11/8/2020 Yes    Epileptic seizure ([address] 11/8/2020 Yes    Overview Signed 11/8/2020 12:16 AM by Jamee Arriaga RN, NP     Patient has history of epilepsy with Sonya Wesley syndome [last seizure 2 weeks back, also has non-functional VNS ]. His usual seizure description is that he drops back and he goes over like a \"tree\".  If not seated he falls without warning.     -Continue home dose of Lamotrigine 200 mg BID, Divalproex Dr 750 TID and neurontin 100 TID  -Seizure precautions         Hypothyroidism 11/8/2020 Yes    HTN (hypertension) 11/8/2020 Yes    Chronic pain 11/8/2020 Yes        Lab Results   Component Value Date    WBC 8.2 11/07/2020    HGB 14.3 11/07/2020    HCT 42.8 11/07/2020    MCV 88.1 11/07/2020     11/07/2020     Lab Results   Component Value Date     11/07/2020    K 4.7 11/07/2020    K 2.7 08/03/2020     11/07/2020    CO2 27 11/07/2020    BUN 20 11/07/2020    CREATININE 0.92 11/07/2020    GLUCOSE 126 11/07/2020    CALCIUM 10.4 11/07/2020 Assessment & Plan    1) SBO  Resolved on po diet  2) Seizure s  C/w home emds  3) HTn stable  4) chronic pain controlled  5) asymtomatic bacturia  FU ID about IV abx  Pt can be discharged once ok with general sugery  No overnight events  Electronically signed by Brit Motley MD on 11/8/20 at 1:10 PM EST

## 2020-11-10 NOTE — CONSULTS
Infectious Diseases Inpatient Consult Note      Reason for Consult:   ESBL Klebsiella UTI  Requesting Physician:   Dr Yaz Reeves  Primary Care Physician:  Katiana Thomas MD  History Obtained From:   Pt, EPIC    Admit Date: 11/7/2020  Hospital Day: 4      HISTORY OF PRESENT ILLNESS:  This is a 44 y.o. male with MRDD was admitted to [facility]  From home  through ER with acute onset of nausea vomiting and abdominal distention, was found to have small bowel obstruction that was treated conservatively. Patient had a urinalysis that has trace leukocyte esterase. Is incontinent of urine on occasion. Urine culture showed ESBL Klebsiella. Patient is a poor historian and denies any specific complaints. He denies any abdominal pain, no dysuria, no fever or chills. He is anxious to go home. CHIEF COMPLAINT:      Past Medical History:   Diagnosis Date    CKD (chronic kidney disease)     Diabetes mellitus ([address]     Has a tremor     Hyperlipidemia     Hypokalemia     Intellectual disability     Kidney stone     Lennox-Gastaut syndrome (HCC)     Paralytic ileus (HCC)     Thyroid disease     Venous thrombosis and embolism        Past Surgical History:   Procedure Laterality Date    LITHOTRIPSY  07/30/2020       Current Medications:     fosfomycin tromethamine  3 g Oral Once    divalproex  750 mg Oral BID WC    metoclopramide  5 mg Intravenous Q8H    bisacodyl  10 mg Rectal Daily    baclofen  10 mg Oral QAM    divalproex  500 mg Oral Nightly    lamoTRIgine  200 mg Oral BID    levothyroxine  50 mcg Oral Daily    propranolol  60 mg Oral Daily       Allergies:  Cefazolin; Celontin [methsuximide]; Duricef [cefadroxil];  Simvastatin; Tramadol; and Vicodin [hydrocodone-acetaminophen]    Social History     Socioeconomic History    Marital status: Single     Spouse name: Not on file    Number of children: Not on file    Years of education: Not on file    Highest education level: Not on file   Occupational History  Not on file   Social Needs    Financial resource strain: Not on file    Food insecurity     Worry: Not on file     Inability: Not on file    Transportation needs     Medical: Not on file     Non-medical: Not on file   Tobacco Use    Smoking status: Never Smoker    Smokeless tobacco: Never Used   Substance and Sexual Activity    Alcohol use: Never     Frequency: Never    Drug use: Never    Sexual activity: Not Currently   Lifestyle    Physical activity     Days per week: Not on file     Minutes per session: Not on file    Stress: Not on file   Relationships    Social connections     Talks on phone: Not on file     Gets together: Not on file     Attends [employer] service: Not on file     Active member of club or organization: Not on file     Attends meetings of clubs or organizations: Not on file     Relationship status: Not on file    Intimate partner violence     Fear of current or ex partner: Not on file     Emotionally abused: Not on file     Physically abused: Not on file     Forced sexual activity: Not on file   Other Topics Concern    Not on file   Social History Narrative    Not on file         Family History:   History reviewed. No pertinent family history. Review of Systems  14 system review is negative other than HPI    Physical Exam  Vitals:    11/09/20 2114 11/09/20 2132 11/10/20 0715 11/10/20 0920   BP: (!) 125/111  118/61 137/61   Pulse: 62 62 50 52   Resp: 16      Temp: 98.1 °F (36.7 °C)  98.2 °F (36.8 °C)    TempSrc: Axillary  Oral Oral   SpO2: 94% 94%       General Appearance: aler tand oriented to person, place, well-developed and well-nourished, in no acute distress  Skin: warm anddry, no rash. Head: normocephalic and atraumatic  Eyes: extraocular eye movements intact, conjunctivae normal, anicteric sclerae  ENT: oropharynx clear and moist with normal mucous membranes.  No thrush  Lungs: normal respiratory effort, Clear Lungs, no rhonchi, no crackles, no wheezes  Heart:RRR, nl S1/S2, no murmur  Abdomen: soft, no tenderness, no H-S-megaly, + BS  NEUROLOGICAL: alert and oriented x 3, no focal deficits  trace leg edema  No erythema, no warmth, no tenderness        DATA:    Lab Results   Component Value Date    WBC 8.2 11/07/2020    HGB 14.3 11/07/2020    HCT 42.8 11/07/2020    MCV 88.1 11/07/2020     11/07/2020     Lab Results   Component Value Date    CREATININE 0.92 11/07/2020    BUN 20 11/07/2020     11/07/2020    K 4.7 11/07/2020     11/07/2020    CO2 27 11/07/2020       Hepatic Function Panel:   Lab Results   Component Value Date    ALKPHOS 68 11/07/2020    ALT <5 11/07/2020    AST 10 11/07/2020    PROT 8.1 11/07/2020    BILITOT 0.6 11/07/2020    LABALBU 4.5 11/07/2020       Microbiology:   No results for input(s): BC in the last 72 hours. No results for input(s): Harpreet Pinzon in the last 72 hours. Recent Labs     11/07/20  1632   LABURIN >100,000 CFU/ml  Carbapenem antibiotics are the best option for infections caused  by ESBL producing organisms. Other antibiotic classes are  likely to result in treatment failure, even for organisms  demonstrating in vitro susceptibility. CONTACT PRECAUTIONS INDICATED  *           Imaging:   CT A/P       Impression    There is a small bowel obstruction.  There are fluid-filled distended loops of proximal small bowel measuring up to 5.3 cm with a possible subtle transition in the lower abdomen to the left of midline.             IMPRESSION:    · Asymptomatic bacteriuria with ESBL Klebsiella    Patient Active Problem List   Diagnosis    [facility]    Small bowel obstruction ([facility]    Epileptic seizure ([facility]    Hypothyroidism    HTN (hypertension)    Moderate intellectual disabilities    Chronic pain       PLAN:  · Fosfomycin 3 g p.o. x1  · Patient may be discharged home today  · Follow-up as needed    Discussed with patient    Skye Stevenson MD

## 2020-11-10 NOTE — PROGRESS NOTES
abd remains distended  Denies nausea  Tolerating clear liquids  Taking oral medication without issue  Pt incont of urine and stool being repositioned q2h

## 2020-11-10 NOTE — PROGRESS NOTES
Pt Name: Ashley Arora  Medical Record Number: [id number]  Date of Birth 1970   Admit date 2020  8:35 PM  Today's Date: 11/10/2020     ASSESSMENT  1. Hospital day # 3  2. Resolved SBO  3. Noncompliance  4. MRDD    PLAN  1. Refusing all testing treatments including Covid testing  2. Discharge back to group home if they will accept him    SUBJECTIVE  Chief complaint: He wants to go home  Afebrile, vital signs are stable. He denies any nausea or vomiting, has not passed flatus or had a bowel movement. He is tolerating a DIET GENERAL;. His pain is well controlled on current medications. Refusing all testing including small bowel follow-through blood work and Covid test.  Waiting to see if group home will accept him back without a Covid test.      has a past medical history of CKD (chronic kidney disease), Diabetes mellitus ([facility] [address] Has a tremor, Hyperlipidemia, Hypokalemia, Intellectual disability, Kidney stone, Lennox-Gastaut syndrome ([facility] [address] Paralytic ileus ([facility] [address] Thyroid disease, and Venous thrombosis and embolism. CURRENT MEDS  Scheduled Meds:   divalproex  750 mg Oral BID WC    metoclopramide  5 mg Intravenous Q8H    bisacodyl  10 mg Rectal Daily    baclofen  10 mg Oral QAM    divalproex  500 mg Oral Nightly    lamoTRIgine  200 mg Oral BID    levothyroxine  50 mcg Oral Daily    propranolol  60 mg Oral Daily     Continuous Infusions:   sodium chloride 100 mL/hr at 11/10/20 0511     PRN Meds:.ketorolac, ondansetron    OBJECTIVE  CURRENT VITALS:  oral temperature is 98.2 °F (36.8 °C). His blood pressure is 137/61 and his pulse is 52. His respiration is 16 and oxygen saturation is 94%.    Temperature Range (24h):Temp: 98.2 °F (36.8 °C) Temp  Av.2 °F (36.8 °C)  Min: 98.1 °F (36.7 °C)  Max: 98.2 °F (36.8 °C)  BP Range (12I): Systolic (80YQF), ZMJ:583 , Min:118 , ERW:537     Diastolic (91PEG), XWH:99, Min:61, Max:111    Pulse Range (24h): Pulse  Av.5  Min: 50  Max: 62  Respiration

## 2020-11-10 NOTE — PROGRESS NOTES
Patient assessment and vitals complete and stable. Patient refused morning medications, patient refused to be bathed, repositioned or to be assisted with breakfast, much education provided to patient and he still refused stating \"I SAID NO! Do not touch me' Patient left resting in bed, with call light within reach.

## 2020-11-10 NOTE — CARE COORDINATION
SPOKE WITH GERARD AT [facility]. UPDATED ON PATIENT'S CONDITION PER REQUEST. INFORMED HER OF POSSIBLE DC TODAY, AWAITING ID INPUT. GROUP HOME REQUESTING PO ABX IF ABX NEEDED AT ALL DUE TO PATIENT BEHAVIORS.  NURSE TO CALL REPORT [phone] AT [place].

## 2020-11-11 NOTE — DISCHARGE SUMMARY
Pt Name: Nehal Rehman  MRN: [id number]  YOB: 1970  Primary Care Physician: Kendal Desouza MD  Admit date:  11/7/2020  8:35 PM  Discharge date:  11/10/2020  6:15 PM  Disposition: group home  Admitting Diagnosis: small bowel obstruction  Discharge Diagnosis: Gastroenteritis, noncompliance  Patient Active Problem List   Diagnosis Code    Sepsis ([facility] [address] A41.9    Small bowel obstruction ([facility] [address] K56.609    Epileptic seizure ([facility] [address] G40.909    Hypothyroidism E03.9    HTN (hypertension) I10    Moderate intellectual disabilities F71    Chronic pain G89.29     Consultants: Hospitalist  Procedures/Diagnostic Test: None  Hospital Course: Lenka Wolf originally presented to the hospital on 11/7/2020  8:35 PM with nausea vomiting CAT scan evidence of small bowel obstruction. He was admitted to general surgery. Once getting in the hospital the patient refused all treatments including x-rays,  blood work and Covid testing. He was started back on a diet which he tolerated well  At time of discharge, Lenka Wolf was tolerating a regular diet, having bowel movements,ambulating on his own accord and had adequate analgesia on oral pain medications, and had no signs of symptoms of complications. PHYSICAL EXAMINATION   Discharge Vitals:  oral temperature is 98.2 °F (36.8 °C). His blood pressure is 137/61 and his pulse is 52. His respiration is 16 and oxygen saturation is 94%.    General appearance - alert, well appearing, and in no distress  Chest - clear to ausculation  Heart - normal rate and regular rhythm  Abdomen - soft, nontender, bowel sounds present  Neurological - motor and sensory grossly normal bilaterally  Musculoskeletal - full range of motion without pain  Extremities - peripheral pulses normal, no pedal edema, no clubbing or cyanosis    LABS     Recent Labs     11/07/20  1602   WBC 8.2   HGB 14.3   HCT 42.8         K 4.7      CO2 27   BUN 20   CREATININE 0.92     DISCHARGE INSTRUCTIONS Discharge Medications:      Medication List      CONTINUE taking these medications    baclofen 10 MG tablet  Commonly known as:  LIORESAL     * Depakote  MG extended release tablet  Generic drug:  divalproex     * Depakote  MG extended release tablet  Generic drug:  divalproex     DULCOLAX RE     ferrous sulfate 325 (65 Fe) MG tablet  Commonly known as:  IRON 325  Take 1 tablet by mouth 2 times daily (with meals)     fleet 7-19 GM/118ML     gabapentin 100 MG capsule  Commonly known as:  NEURONTIN  Take 1 capsule by mouth 3 times daily for 30 days. lamoTRIgine 200 MG tablet  Commonly known as:  LAMICTAL     levothyroxine 50 MCG tablet  Commonly known as:  SYNTHROID     Milk of Magnesia 400 MG/5ML suspension  Generic drug:  magnesium hydroxide     polyethylene glycol 17 g packet  Commonly known as:  GLYCOLAX     propranolol 60 MG extended release capsule  Commonly known as:  INDERAL LA         * This list has 2 medication(s) that are the same as other medications prescribed for you. Read the directions carefully, and ask your doctor or other care provider to review them with you.             STOP taking these medications    potassium chloride 20 MEQ/15ML (10%) oral solution          Diet: diet as tolerated  Activity: activity as tolerated and no lifting more than 10-20 lbs for next two weeks  Follow-up:  in 2 weeks with Jose Valladares MD,   Time Spent for discharge: 20 minutes

## 2020-11-13 LAB
BLOOD CULTURE, ROUTINE: NORMAL
CULTURE, BLOOD 2: NORMAL

## 2020-11-18 ENCOUNTER — HOSPITAL ENCOUNTER (EMERGENCY)
Age: 50
Discharge: HOME | End: 2020-11-18
Attending: EMERGENCY MEDICINE
Payer: MEDICARE

## 2020-11-18 ENCOUNTER — APPOINTMENT (OUTPATIENT)
Dept: CT IMAGING | Age: 50
End: 2020-11-18
Payer: MEDICARE

## 2020-11-18 VITALS
BODY MASS INDEX: 32.55 KG/M2 | SYSTOLIC BLOOD PRESSURE: 133 MMHG | WEIGHT: 240 LBS | RESPIRATION RATE: 17 BRPM | OXYGEN SATURATION: 98 % | DIASTOLIC BLOOD PRESSURE: 71 MMHG | HEART RATE: 54 BPM | TEMPERATURE: 98.7 F

## 2020-11-18 LAB
ALBUMIN SERPL-MCNC: 3.7 G/DL (ref 3.5–4.6)
ALP BLD-CCNC: 46 U/L (ref 35–104)
ALT SERPL-CCNC: <5 U/L (ref 0–41)
ANION GAP SERPL CALCULATED.3IONS-SCNC: 10 MEQ/L (ref 9–15)
AST SERPL-CCNC: 9 U/L (ref 0–40)
BACTERIA: ABNORMAL /HPF
BASOPHILS ABSOLUTE: 0.1 K/UL (ref 0–0.2)
BASOPHILS RELATIVE PERCENT: 1.1 %
BILIRUB SERPL-MCNC: 0.3 MG/DL (ref 0.2–0.7)
BILIRUBIN URINE: NEGATIVE
BLOOD, URINE: ABNORMAL
BUN BLDV-MCNC: 10 MG/DL (ref 6–20)
CALCIUM SERPL-MCNC: 8.7 MG/DL (ref 8.5–9.9)
CHLORIDE BLD-SCNC: 107 MEQ/L (ref 95–107)
CLARITY: ABNORMAL
CO2: 30 MEQ/L (ref 20–31)
COLOR: YELLOW
CREAT SERPL-MCNC: 0.83 MG/DL (ref 0.7–1.2)
EOSINOPHILS ABSOLUTE: 0.2 K/UL (ref 0–0.7)
EOSINOPHILS RELATIVE PERCENT: 4.4 %
EPITHELIAL CELLS, UA: ABNORMAL /HPF (ref 0–5)
GFR AFRICAN AMERICAN: >60
GFR NON-AFRICAN AMERICAN: >60
GLOBULIN: 2.5 G/DL (ref 2.3–3.5)
GLUCOSE BLD-MCNC: 88 MG/DL (ref 70–99)
GLUCOSE URINE: NEGATIVE MG/DL
HCT VFR BLD CALC: 36.2 % (ref 42–52)
HEMOGLOBIN: 11.9 G/DL (ref 14–18)
HYALINE CASTS: ABNORMAL /HPF (ref 0–5)
KETONES, URINE: NEGATIVE MG/DL
LACTIC ACID: 1 MMOL/L (ref 0.5–2.2)
LEUKOCYTE ESTERASE, URINE: ABNORMAL
LIPASE: 39 U/L (ref 12–95)
LYMPHOCYTES ABSOLUTE: 2.1 K/UL (ref 1–4.8)
LYMPHOCYTES RELATIVE PERCENT: 37.9 %
MCH RBC QN AUTO: 29.1 PG (ref 27–31.3)
MCHC RBC AUTO-ENTMCNC: 32.8 % (ref 33–37)
MCV RBC AUTO: 88.8 FL (ref 80–100)
MONOCYTES ABSOLUTE: 0.3 K/UL (ref 0.2–0.8)
MONOCYTES RELATIVE PERCENT: 6.3 %
NEUTROPHILS ABSOLUTE: 2.7 K/UL (ref 1.4–6.5)
NEUTROPHILS RELATIVE PERCENT: 50.3 %
NITRITE, URINE: NEGATIVE
PDW BLD-RTO: 16 % (ref 11.5–14.5)
PH UA: 6.5 (ref 5–9)
PLATELET # BLD: 248 K/UL (ref 130–400)
POTASSIUM SERPL-SCNC: 3.1 MEQ/L (ref 3.4–4.9)
PROTEIN UA: 100 MG/DL
RBC # BLD: 4.08 M/UL (ref 4.7–6.1)
RBC UA: ABNORMAL /HPF (ref 0–5)
SODIUM BLD-SCNC: 147 MEQ/L (ref 135–144)
SPECIFIC GRAVITY UA: 1.02 (ref 1–1.03)
TOTAL PROTEIN: 6.2 G/DL (ref 6.3–8)
URINE REFLEX TO CULTURE: YES
UROBILINOGEN, URINE: 0.2 E.U./DL
WBC # BLD: 5.5 K/UL (ref 4.8–10.8)
WBC UA: >100 /HPF (ref 0–5)

## 2020-11-18 PROCEDURE — 6360000002 HC RX W HCPCS: Performed by: EMERGENCY MEDICINE

## 2020-11-18 PROCEDURE — 96374 THER/PROPH/DIAG INJ IV PUSH: CPT

## 2020-11-18 PROCEDURE — 51798 US URINE CAPACITY MEASURE: CPT

## 2020-11-18 PROCEDURE — 74177 CT ABD & PELVIS W/CONTRAST: CPT

## 2020-11-18 PROCEDURE — 85025 COMPLETE CBC W/AUTO DIFF WBC: CPT

## 2020-11-18 PROCEDURE — 87086 URINE CULTURE/COLONY COUNT: CPT

## 2020-11-18 PROCEDURE — 80053 COMPREHEN METABOLIC PANEL: CPT

## 2020-11-18 PROCEDURE — 6360000004 HC RX CONTRAST MEDICATION: Performed by: EMERGENCY MEDICINE

## 2020-11-18 PROCEDURE — 6370000000 HC RX 637 (ALT 250 FOR IP): Performed by: EMERGENCY MEDICINE

## 2020-11-18 PROCEDURE — 99285 EMERGENCY DEPT VISIT HI MDM: CPT

## 2020-11-18 PROCEDURE — 81001 URINALYSIS AUTO W/SCOPE: CPT

## 2020-11-18 PROCEDURE — 87077 CULTURE AEROBIC IDENTIFY: CPT

## 2020-11-18 PROCEDURE — 83690 ASSAY OF LIPASE: CPT

## 2020-11-18 PROCEDURE — 87186 SC STD MICRODIL/AGAR DIL: CPT

## 2020-11-18 PROCEDURE — 83605 ASSAY OF LACTIC ACID: CPT

## 2020-11-18 PROCEDURE — 2580000003 HC RX 258: Performed by: EMERGENCY MEDICINE

## 2020-11-18 PROCEDURE — 36415 COLL VENOUS BLD VENIPUNCTURE: CPT

## 2020-11-18 RX ORDER — GRANULES FOR ORAL 3 G/1
3 POWDER ORAL ONCE
Status: COMPLETED | OUTPATIENT
Start: 2020-11-18 | End: 2020-11-18

## 2020-11-18 RX ORDER — ONDANSETRON 4 MG/1
4 TABLET, ORALLY DISINTEGRATING ORAL EVERY 8 HOURS PRN
Qty: 20 TABLET | Refills: 0 | Status: SHIPPED | OUTPATIENT
Start: 2020-11-18 | End: 2020-11-18 | Stop reason: SDUPTHER

## 2020-11-18 RX ORDER — ONDANSETRON 2 MG/ML
4 INJECTION INTRAMUSCULAR; INTRAVENOUS ONCE
Status: COMPLETED | OUTPATIENT
Start: 2020-11-18 | End: 2020-11-18

## 2020-11-18 RX ORDER — ONDANSETRON 4 MG/1
4 TABLET, ORALLY DISINTEGRATING ORAL EVERY 8 HOURS PRN
Qty: 20 TABLET | Refills: 0 | Status: ON HOLD | OUTPATIENT
Start: 2020-11-18 | End: 2020-12-03 | Stop reason: HOSPADM

## 2020-11-18 RX ORDER — 0.9 % SODIUM CHLORIDE 0.9 %
1000 INTRAVENOUS SOLUTION INTRAVENOUS ONCE
Status: COMPLETED | OUTPATIENT
Start: 2020-11-18 | End: 2020-11-18

## 2020-11-18 RX ORDER — KETOROLAC TROMETHAMINE 30 MG/ML
30 INJECTION, SOLUTION INTRAMUSCULAR; INTRAVENOUS ONCE
Status: COMPLETED | OUTPATIENT
Start: 2020-11-18 | End: 2020-11-18

## 2020-11-18 RX ADMIN — IOPAMIDOL 100 ML: 612 INJECTION, SOLUTION INTRAVENOUS at 13:03

## 2020-11-18 RX ADMIN — KETOROLAC TROMETHAMINE 30 MG: 30 INJECTION, SOLUTION INTRAMUSCULAR at 11:43

## 2020-11-18 RX ADMIN — GRANULES FOR ORAL SOLUTION 1 PACKET: 3 POWDER ORAL at 16:16

## 2020-11-18 RX ADMIN — SODIUM CHLORIDE 1000 ML: 9 INJECTION, SOLUTION INTRAVENOUS at 11:43

## 2020-11-18 RX ADMIN — ONDANSETRON 4 MG: 2 INJECTION INTRAMUSCULAR; INTRAVENOUS at 11:43

## 2020-11-18 ASSESSMENT — ENCOUNTER SYMPTOMS
VOMITING: 1
ABDOMINAL PAIN: 1
NAUSEA: 0
SORE THROAT: 0
EYE PAIN: 0
SHORTNESS OF BREATH: 0
CHEST TIGHTNESS: 0

## 2020-11-18 ASSESSMENT — PAIN SCALES - GENERAL
PAINLEVEL_OUTOF10: 0
PAINLEVEL_OUTOF10: 4

## 2020-11-18 NOTE — ED PROVIDER NOTES
[facility] ED  EMERGENCY DEPARTMENT ENCOUNTER      Pt Name: Tere Lobo  MRN: [id number]  [place] 1970  Date of evaluation: 11/18/2020  Provider: Kailyn Moore DO    CHIEF COMPLAINT       Chief Complaint   Patient presents with    Other     pt sent from [facility] for possible SBO, urine retention,and headache,          HISTORY OF PRESENT ILLNESS   (Location/Symptom, Timing/Onset, Context/Setting, Quality, Duration, Modifying Factors, Severity)  Note limiting factors. Tere Lobo is a 44 y.o. male who presents to the emergency department . Patient comes in with vomiting. Nursing home was concerned that he might have a small bowel obstruction. He did have a small bowel obstruction last week and was admitted to the hospital.  On day 2 he refused to allow anyone to do anything for him and luckily his small bowel obstruction resolved. Patient has MRDD. His mother is his POA but because of the Covid pandemic she was not able to stay with him in the hospital to push him to allow the interventions to be done. HPI    Nursing Notes were reviewed. REVIEW OF SYSTEMS    (2-9 systems for level 4, 10 or more for level 5)     Review of Systems   Constitutional: Negative for activity change, appetite change and fatigue. HENT: Negative for congestion and sore throat. Eyes: Negative for pain and visual disturbance. Respiratory: Negative for chest tightness and shortness of breath. Cardiovascular: Negative for chest pain. Gastrointestinal: Positive for abdominal pain and vomiting. Negative for nausea. Endocrine: Negative for polydipsia. Genitourinary: Negative for flank pain and urgency. Musculoskeletal: Negative for gait problem and neck stiffness. Skin: Negative for rash. Neurological: Negative for weakness, light-headedness and headaches. Psychiatric/Behavioral: Negative for confusion and sleep disturbance.        Except as noted above the remainder of the review of systems was reviewed and negative. PAST MEDICAL HISTORY     Past Medical History:   Diagnosis Date    CKD (chronic kidney disease)     Diabetes mellitus ([facility] [address]     Has a tremor     Hyperlipidemia     Hypokalemia     Intellectual disability     Kidney stone     Lennox-Gastaut syndrome (HCC)     Paralytic ileus (HCC)     Thyroid disease     Venous thrombosis and embolism          SURGICAL HISTORY       Past Surgical History:   Procedure Laterality Date    LITHOTRIPSY  07/30/2020         CURRENT MEDICATIONS       Previous Medications    BACLOFEN (LIORESAL) 10 MG TABLET    Take 10 mg by mouth every morning    BISACODYL (DULCOLAX RE)    Place 1 Units rectally daily as needed (constipation)    DIVALPROEX (DEPAKOTE ER) 250 MG EXTENDED RELEASE TABLET    Take 750 mg by mouth 2 times daily    DIVALPROEX (DEPAKOTE ER) 500 MG EXTENDED RELEASE TABLET    Take 500 mg by mouth nightly    FERROUS SULFATE (IRON 325) 325 (65 FE) MG TABLET    Take 1 tablet by mouth 2 times daily (with meals)    GABAPENTIN (NEURONTIN) 100 MG CAPSULE    Take 1 capsule by mouth 3 times daily for 30 days. LAMOTRIGINE (LAMICTAL) 200 MG TABLET    Take 200 mg by mouth 2 times daily    LEVOTHYROXINE (SYNTHROID) 50 MCG TABLET    Take 50 mcg by mouth Daily    MAGNESIUM HYDROXIDE (MILK OF MAGNESIA) 400 MG/5ML SUSPENSION    Take 30 mLs by mouth daily as needed for Constipation    POLYETHYLENE GLYCOL (GLYCOLAX) 17 G PACKET    Take 17 g by mouth daily as needed for Constipation    PROPRANOLOL (INDERAL LA) 60 MG EXTENDED RELEASE CAPSULE    Take 60 mg by mouth daily    SODIUM PHOSPHATES (FLEET) 7-19 GM/118ML    Place 1 enema rectally daily as needed (constipation)       ALLERGIES     Cefazolin; Celontin [methsuximide]; Duricef [cefadroxil]; Simvastatin; Tramadol; and Vicodin [hydrocodone-acetaminophen]    FAMILY HISTORY     History reviewed. No pertinent family history.        SOCIAL HISTORY       Social History     Socioeconomic History neck supple. Thyroid: No thyromegaly. Vascular: No JVD. Trachea: No tracheal deviation. Cardiovascular:      Rate and Rhythm: Normal rate. Heart sounds: Normal heart sounds. No murmur. Pulmonary:      Effort: Pulmonary effort is normal. No respiratory distress. Breath sounds: Normal breath sounds. No wheezing. Abdominal:      General: Bowel sounds are normal.      Palpations: Abdomen is soft. Tenderness: There is no abdominal tenderness. There is no guarding or rebound. Hernia: No hernia is present. Comments: Hyperactive bowel sounds   Musculoskeletal: Normal range of motion. Skin:     General: Skin is warm and dry. Findings: No rash. Neurological:      Mental Status: He is alert and oriented to person, place, and time. Cranial Nerves: No cranial nerve deficit. Psychiatric:         Behavior: Behavior normal.         DIAGNOSTIC RESULTS     EKG: All EKG's are interpreted by the Emergency Department Physician who either signs or Co-signs this chart in the absence of a cardiologist.        RADIOLOGY:   Non-plain film images such as CT, Ultrasound and MRI are read by the radiologist. Plain radiographic images are visualized and preliminarily interpreted by the emergency physician with the below findings:        Interpretation per the Radiologist below, if available at the time of this note:    CT ABDOMEN PELVIS W IV CONTRAST Additional Contrast? None   Final Result   RESOLVING THE [address] [facility]. NONSPECIFIC DIFFUSE BLADDER WALL THICKENING. CONSIDER CYSTITIS.                      ED BEDSIDE ULTRASOUND:   Performed by ED Physician - none    LABS:  Labs Reviewed   URINE RT REFLEX TO CULTURE - Abnormal; Notable for the following components:       Result Value    Clarity, UA TURBID (*)     Blood, Urine SMALL (*)     Protein,  (*)     Leukocyte Esterase, Urine MODERATE (*)     All other components within normal limits   CBC WITH AUTO DIFFERENTIAL - Abnormal; Notable for the following components:    RBC 4.08 (*)     Hemoglobin 11.9 (*)     Hematocrit 36.2 (*)     MCHC 32.8 (*)     RDW 16.0 (*)     All other components within normal limits   COMPREHENSIVE METABOLIC PANEL - Abnormal; Notable for the following components:    Sodium 147 (*)     Potassium 3.1 (*)     Total Protein 6.2 (*)     All other components within normal limits   MICROSCOPIC URINALYSIS - Abnormal; Notable for the following components:    Bacteria, UA MANY (*)     WBC, UA >100 (*)     RBC, UA 3-5 (*)     All other components within normal limits   CULTURE, URINE   LACTIC ACID, PLASMA   LIPASE       All other labs were within normal range or not returned as of this dictation. EMERGENCY DEPARTMENT COURSE and DIFFERENTIAL DIAGNOSIS/MDM:   Vitals:    Vitals:    11/18/20 1124 11/18/20 1246 11/18/20 1345   BP: (!) 128/104 (!) 142/73 126/85   Pulse: 61 56 55   Resp: 18 18 18   Temp: 98.7 °F (37.1 °C)     TempSrc: Axillary     SpO2: 98% 100% 99%   Weight: 240 lb (108.9 kg)         Patient came in with vomiting. CAT scan shows resolving small bowel obstruction. Patient has not been allowing the nursing home to straight cath him at night. He does have a urine infection but his urine culture from last week showed that he was resistant to almost everything that was not IV. Infectious disease did not feel that he needed IV treatment and gave him fosfomycin orally. I will give him 1 dose of fosfomycin 3 g and discharge him. I believe his UTI is asymptomatic. MDM      REASSESSMENT          CRITICAL CARE TIME   Total Critical Care time was 0 minutes, excluding separately reportable procedures. There was a high probability of clinically significant/life threatening deterioration in the patient's condition which required my urgent intervention. CONSULTS:  None    PROCEDURES:  Unless otherwise noted below, none     Procedures        FINAL IMPRESSION      1.  Vomiting in adult 2. Asymptomatic bacteriuria          DISPOSITION/PLAN   DISPOSITION Discharge - Pending Orders Complete 11/18/2020 03:14:44 PM      PATIENT REFERRED TO:  Sonido Corona MD  [address] [facility]  [phone]      As needed      DISCHARGE MEDICATIONS:  New Prescriptions    ONDANSETRON (ZOFRAN ODT) 4 MG DISINTEGRATING TABLET    Take 1 tablet by mouth every 8 hours as needed for Nausea     Controlled Substances Monitoring:     No flowsheet data found.     (Please note that portions of this note were completed with a voice recognition program.  Efforts were made to edit the dictations but occasionally words are mis-transcribed.)    Kathy Fang DO (electronically signed)  Attending Emergency Physician            Kathy Fang DO  11/18/20 7973

## 2020-11-18 NOTE — ED TRIAGE NOTES
Pt sent from [address] [place] [address] for possible SBO, urine retention, headache and ABD pain, Pt denies ABD pain, c/o a headache, Pt recently seen here for SBO, NH reports Pt refusing meds, straight caths, and hasnt been eating, also reports dark green liquid stools,Pt is A&OX2 which is his baseline, afebrile, breathes are equal and unlabored.

## 2020-11-18 NOTE — ED NOTES
Bed: 21  Expected date: 11/18/20  Expected time: 11:14 AM  Means of arrival:   Comments:  50M coming from \[facility]\"" in [facility], possible UTI and SBO, developmentally delayed     Peter Teague RN  11/18/20 6679

## 2020-11-19 LAB
GFR AFRICAN AMERICAN: >60
GFR NON-AFRICAN AMERICAN: >60
PERFORMED ON: ABNORMAL
POC CREATININE: 0.8 MG/DL (ref 0.9–1.3)
POC SAMPLE TYPE: ABNORMAL

## 2020-11-21 LAB
ORGANISM: ABNORMAL
URINE CULTURE, ROUTINE: ABNORMAL

## 2020-11-26 ENCOUNTER — HOSPITAL ENCOUNTER (INPATIENT)
Age: 50
LOS: 3 days | Discharge: NURSING, REHAB OR LONG-TERM CARE | DRG: 690 | End: 2020-11-29
Attending: EMERGENCY MEDICINE | Admitting: INTERNAL MEDICINE
Payer: MEDICARE

## 2020-11-26 ENCOUNTER — HOSPITAL ENCOUNTER (EMERGENCY)
Age: 50
Discharge: HOME | End: 2020-11-26
Attending: EMERGENCY MEDICINE
Payer: MEDICARE

## 2020-11-26 ENCOUNTER — APPOINTMENT (OUTPATIENT)
Dept: CT IMAGING | Age: 50
End: 2020-11-26
Payer: MEDICARE

## 2020-11-26 VITALS
HEART RATE: 72 BPM | BODY MASS INDEX: 32.51 KG/M2 | TEMPERATURE: 97.5 F | RESPIRATION RATE: 18 BRPM | HEIGHT: 72 IN | WEIGHT: 240 LBS | DIASTOLIC BLOOD PRESSURE: 79 MMHG | OXYGEN SATURATION: 98 % | SYSTOLIC BLOOD PRESSURE: 138 MMHG

## 2020-11-26 PROBLEM — N39.0 UTI (URINARY TRACT INFECTION): Status: ACTIVE | Noted: 2020-11-26

## 2020-11-26 LAB
ALBUMIN SERPL-MCNC: 4 G/DL (ref 3.5–4.6)
ALBUMIN SERPL-MCNC: 4.4 G/DL (ref 3.5–4.6)
ALP BLD-CCNC: 52 U/L (ref 35–104)
ALP BLD-CCNC: 54 U/L (ref 35–104)
ALT SERPL-CCNC: <5 U/L (ref 0–41)
ALT SERPL-CCNC: <5 U/L (ref 0–41)
ANION GAP SERPL CALCULATED.3IONS-SCNC: 11 MEQ/L (ref 9–15)
ANION GAP SERPL CALCULATED.3IONS-SCNC: 12 MEQ/L (ref 9–15)
APTT: 30.2 SEC (ref 24.4–36.8)
AST SERPL-CCNC: 10 U/L (ref 0–40)
AST SERPL-CCNC: 10 U/L (ref 0–40)
BASOPHILS ABSOLUTE: 0 K/UL (ref 0–0.2)
BASOPHILS ABSOLUTE: 0 K/UL (ref 0–0.2)
BASOPHILS RELATIVE PERCENT: 0.3 %
BASOPHILS RELATIVE PERCENT: 0.5 %
BILIRUB SERPL-MCNC: 0.4 MG/DL (ref 0.2–0.7)
BILIRUB SERPL-MCNC: 0.5 MG/DL (ref 0.2–0.7)
BUN BLDV-MCNC: 17 MG/DL (ref 6–20)
BUN BLDV-MCNC: 20 MG/DL (ref 6–20)
CALCIUM SERPL-MCNC: 10.1 MG/DL (ref 8.5–9.9)
CALCIUM SERPL-MCNC: 9.4 MG/DL (ref 8.5–9.9)
CHLORIDE BLD-SCNC: 102 MEQ/L (ref 95–107)
CHLORIDE BLD-SCNC: 104 MEQ/L (ref 95–107)
CO2: 24 MEQ/L (ref 20–31)
CO2: 29 MEQ/L (ref 20–31)
CREAT SERPL-MCNC: 0.8 MG/DL (ref 0.7–1.2)
CREAT SERPL-MCNC: 0.91 MG/DL (ref 0.7–1.2)
EOSINOPHILS ABSOLUTE: 0.4 K/UL (ref 0–0.7)
EOSINOPHILS ABSOLUTE: 0.4 K/UL (ref 0–0.7)
EOSINOPHILS RELATIVE PERCENT: 4.3 %
EOSINOPHILS RELATIVE PERCENT: 5.7 %
GFR AFRICAN AMERICAN: >60
GFR AFRICAN AMERICAN: >60
GFR NON-AFRICAN AMERICAN: >60
GFR NON-AFRICAN AMERICAN: >60
GLOBULIN: 2.8 G/DL (ref 2.3–3.5)
GLOBULIN: 3 G/DL (ref 2.3–3.5)
GLUCOSE BLD-MCNC: 104 MG/DL (ref 70–99)
GLUCOSE BLD-MCNC: 90 MG/DL (ref 70–99)
HCT VFR BLD CALC: 37.6 % (ref 42–52)
HCT VFR BLD CALC: 39.5 % (ref 42–52)
HEMOGLOBIN: 12.3 G/DL (ref 14–18)
HEMOGLOBIN: 13 G/DL (ref 14–18)
INR BLD: 1.1
LACTIC ACID: 1.7 MMOL/L (ref 0.5–2.2)
LIPASE: 39 U/L (ref 12–95)
LYMPHOCYTES ABSOLUTE: 1.5 K/UL (ref 1–4.8)
LYMPHOCYTES ABSOLUTE: 1.7 K/UL (ref 1–4.8)
LYMPHOCYTES RELATIVE PERCENT: 17.3 %
LYMPHOCYTES RELATIVE PERCENT: 20.2 %
MCH RBC QN AUTO: 29.3 PG (ref 27–31.3)
MCH RBC QN AUTO: 29.8 PG (ref 27–31.3)
MCHC RBC AUTO-ENTMCNC: 32.8 % (ref 33–37)
MCHC RBC AUTO-ENTMCNC: 33 % (ref 33–37)
MCV RBC AUTO: 89.4 FL (ref 80–100)
MCV RBC AUTO: 90.1 FL (ref 80–100)
MONOCYTES ABSOLUTE: 0.6 K/UL (ref 0.2–0.8)
MONOCYTES ABSOLUTE: 0.9 K/UL (ref 0.2–0.8)
MONOCYTES RELATIVE PERCENT: 12.2 %
MONOCYTES RELATIVE PERCENT: 6.4 %
NEUTROPHILS ABSOLUTE: 4.5 K/UL (ref 1.4–6.5)
NEUTROPHILS ABSOLUTE: 7 K/UL (ref 1.4–6.5)
NEUTROPHILS RELATIVE PERCENT: 61.4 %
NEUTROPHILS RELATIVE PERCENT: 71.7 %
PDW BLD-RTO: 15.9 % (ref 11.5–14.5)
PDW BLD-RTO: 16.1 % (ref 11.5–14.5)
PLATELET # BLD: 285 K/UL (ref 130–400)
PLATELET # BLD: 334 K/UL (ref 130–400)
POTASSIUM REFLEX MAGNESIUM: 4.2 MEQ/L (ref 3.4–4.9)
POTASSIUM REFLEX MAGNESIUM: 4.3 MEQ/L (ref 3.4–4.9)
PRO-BNP: 96 PG/ML
PROCALCITONIN: 0.07 NG/ML (ref 0–0.15)
PROTHROMBIN TIME: 13.8 SEC (ref 12.3–14.9)
RBC # BLD: 4.21 M/UL (ref 4.7–6.1)
RBC # BLD: 4.38 M/UL (ref 4.7–6.1)
SARS-COV-2, NAAT: NOT DETECTED
SODIUM BLD-SCNC: 140 MEQ/L (ref 135–144)
SODIUM BLD-SCNC: 142 MEQ/L (ref 135–144)
TOTAL PROTEIN: 6.8 G/DL (ref 6.3–8)
TOTAL PROTEIN: 7.4 G/DL (ref 6.3–8)
TROPONIN: <0.01 NG/ML (ref 0–0.01)
TSH SERPL DL<=0.05 MIU/L-ACNC: 3.5 UIU/ML (ref 0.44–3.86)
WBC # BLD: 7.3 K/UL (ref 4.8–10.8)
WBC # BLD: 9.8 K/UL (ref 4.8–10.8)

## 2020-11-26 PROCEDURE — 6360000002 HC RX W HCPCS: Performed by: INTERNAL MEDICINE

## 2020-11-26 PROCEDURE — 6360000004 HC RX CONTRAST MEDICATION: Performed by: EMERGENCY MEDICINE

## 2020-11-26 PROCEDURE — 84443 ASSAY THYROID STIM HORMONE: CPT

## 2020-11-26 PROCEDURE — 96365 THER/PROPH/DIAG IV INF INIT: CPT

## 2020-11-26 PROCEDURE — 85025 COMPLETE CBC W/AUTO DIFF WBC: CPT

## 2020-11-26 PROCEDURE — 2580000003 HC RX 258: Performed by: INTERNAL MEDICINE

## 2020-11-26 PROCEDURE — 85730 THROMBOPLASTIN TIME PARTIAL: CPT

## 2020-11-26 PROCEDURE — 2580000003 HC RX 258: Performed by: EMERGENCY MEDICINE

## 2020-11-26 PROCEDURE — 83880 ASSAY OF NATRIURETIC PEPTIDE: CPT

## 2020-11-26 PROCEDURE — 87040 BLOOD CULTURE FOR BACTERIA: CPT

## 2020-11-26 PROCEDURE — 99284 EMERGENCY DEPT VISIT MOD MDM: CPT

## 2020-11-26 PROCEDURE — 74177 CT ABD & PELVIS W/CONTRAST: CPT

## 2020-11-26 PROCEDURE — 80175 DRUG SCREEN QUAN LAMOTRIGINE: CPT

## 2020-11-26 PROCEDURE — 85610 PROTHROMBIN TIME: CPT

## 2020-11-26 PROCEDURE — 80164 ASSAY DIPROPYLACETIC ACD TOT: CPT

## 2020-11-26 PROCEDURE — 1210000000 HC MED SURG R&B

## 2020-11-26 PROCEDURE — 84145 PROCALCITONIN (PCT): CPT

## 2020-11-26 PROCEDURE — 6360000002 HC RX W HCPCS: Performed by: EMERGENCY MEDICINE

## 2020-11-26 PROCEDURE — U0002 COVID-19 LAB TEST NON-CDC: HCPCS

## 2020-11-26 PROCEDURE — 6370000000 HC RX 637 (ALT 250 FOR IP): Performed by: INTERNAL MEDICINE

## 2020-11-26 PROCEDURE — 84484 ASSAY OF TROPONIN QUANT: CPT

## 2020-11-26 PROCEDURE — 96374 THER/PROPH/DIAG INJ IV PUSH: CPT

## 2020-11-26 PROCEDURE — 83690 ASSAY OF LIPASE: CPT

## 2020-11-26 PROCEDURE — 80053 COMPREHEN METABOLIC PANEL: CPT

## 2020-11-26 PROCEDURE — 36415 COLL VENOUS BLD VENIPUNCTURE: CPT

## 2020-11-26 PROCEDURE — 83605 ASSAY OF LACTIC ACID: CPT

## 2020-11-26 RX ORDER — 0.9 % SODIUM CHLORIDE 0.9 %
500 INTRAVENOUS SOLUTION INTRAVENOUS ONCE
Status: COMPLETED | OUTPATIENT
Start: 2020-11-26 | End: 2020-11-26

## 2020-11-26 RX ORDER — ONDANSETRON 4 MG/1
4 TABLET, ORALLY DISINTEGRATING ORAL EVERY 8 HOURS PRN
Status: DISCONTINUED | OUTPATIENT
Start: 2020-11-26 | End: 2020-11-29 | Stop reason: HOSPADM

## 2020-11-26 RX ORDER — 0.9 % SODIUM CHLORIDE 0.9 %
1000 INTRAVENOUS SOLUTION INTRAVENOUS ONCE
Status: COMPLETED | OUTPATIENT
Start: 2020-11-26 | End: 2020-11-26

## 2020-11-26 RX ORDER — MAGNESIUM CARB/ALUMINUM HYDROX 105-160MG
150 TABLET,CHEWABLE ORAL 2 TIMES DAILY PRN
Qty: 2 BOTTLE | Refills: 0 | Status: SHIPPED | OUTPATIENT
Start: 2020-11-26 | End: 2020-12-03

## 2020-11-26 RX ORDER — GUAIFENESIN 600 MG/1
600 TABLET, EXTENDED RELEASE ORAL 2 TIMES DAILY PRN
Status: ON HOLD | COMMUNITY
End: 2021-02-07 | Stop reason: HOSPADM

## 2020-11-26 RX ORDER — BISACODYL 10 MG
10 SUPPOSITORY, RECTAL RECTAL DAILY PRN
Status: DISCONTINUED | OUTPATIENT
Start: 2020-11-26 | End: 2020-11-29 | Stop reason: HOSPADM

## 2020-11-26 RX ORDER — BACLOFEN 10 MG/1
10 TABLET ORAL EVERY MORNING
Status: DISCONTINUED | OUTPATIENT
Start: 2020-11-27 | End: 2020-11-29 | Stop reason: HOSPADM

## 2020-11-26 RX ORDER — FERROUS SULFATE 325(65) MG
325 TABLET ORAL 2 TIMES DAILY WITH MEALS
Status: DISCONTINUED | OUTPATIENT
Start: 2020-11-26 | End: 2020-11-29 | Stop reason: HOSPADM

## 2020-11-26 RX ORDER — DIVALPROEX SODIUM 250 MG/1
750 TABLET, EXTENDED RELEASE ORAL 2 TIMES DAILY
Status: DISCONTINUED | OUTPATIENT
Start: 2020-11-26 | End: 2020-11-29 | Stop reason: HOSPADM

## 2020-11-26 RX ORDER — ACETAMINOPHEN 325 MG/1
650 TABLET ORAL EVERY 4 HOURS PRN
Status: ON HOLD | COMMUNITY

## 2020-11-26 RX ORDER — GABAPENTIN 100 MG/1
100 CAPSULE ORAL 3 TIMES DAILY
Status: DISCONTINUED | OUTPATIENT
Start: 2020-11-26 | End: 2020-11-29 | Stop reason: HOSPADM

## 2020-11-26 RX ORDER — LEVOTHYROXINE SODIUM 0.03 MG/1
50 TABLET ORAL DAILY
Status: DISCONTINUED | OUTPATIENT
Start: 2020-11-26 | End: 2020-11-29 | Stop reason: HOSPADM

## 2020-11-26 RX ORDER — CALCIUM CARBONATE 750 MG/1
1000 TABLET, CHEWABLE ORAL EVERY 4 HOURS PRN
Status: ON HOLD | COMMUNITY

## 2020-11-26 RX ORDER — LAMOTRIGINE 100 MG/1
200 TABLET ORAL 2 TIMES DAILY
Status: DISCONTINUED | OUTPATIENT
Start: 2020-11-26 | End: 2020-11-29 | Stop reason: HOSPADM

## 2020-11-26 RX ORDER — ACETAMINOPHEN 325 MG/1
650 TABLET ORAL EVERY 6 HOURS PRN
Status: DISCONTINUED | OUTPATIENT
Start: 2020-11-26 | End: 2020-11-29 | Stop reason: HOSPADM

## 2020-11-26 RX ORDER — ONDANSETRON 2 MG/ML
4 INJECTION INTRAMUSCULAR; INTRAVENOUS ONCE
Status: COMPLETED | OUTPATIENT
Start: 2020-11-26 | End: 2020-11-26

## 2020-11-26 RX ORDER — POLYETHYLENE GLYCOL 3350 17 G/17G
17 POWDER, FOR SOLUTION ORAL DAILY PRN
Qty: 527 G | Refills: 0 | Status: SHIPPED | OUTPATIENT
Start: 2020-11-26 | End: 2020-12-26

## 2020-11-26 RX ORDER — BUSPIRONE HYDROCHLORIDE 5 MG/1
5 TABLET ORAL 2 TIMES DAILY
Status: ON HOLD | COMMUNITY

## 2020-11-26 RX ORDER — CALCIUM CARBONATE 200(500)MG
1 TABLET,CHEWABLE ORAL EVERY 4 HOURS PRN
Status: DISCONTINUED | OUTPATIENT
Start: 2020-11-26 | End: 2020-11-29 | Stop reason: HOSPADM

## 2020-11-26 RX ORDER — POLYETHYLENE GLYCOL 3350 17 G/17G
17 POWDER, FOR SOLUTION ORAL DAILY PRN
Status: DISCONTINUED | OUTPATIENT
Start: 2020-11-26 | End: 2020-11-29 | Stop reason: HOSPADM

## 2020-11-26 RX ORDER — LORATADINE 10 MG/1
CAPSULE, LIQUID FILLED ORAL DAILY
Status: ON HOLD | COMMUNITY
End: 2020-12-03 | Stop reason: HOSPADM

## 2020-11-26 RX ORDER — BUSPIRONE HYDROCHLORIDE 5 MG/1
5 TABLET ORAL 2 TIMES DAILY
Status: DISCONTINUED | OUTPATIENT
Start: 2020-11-26 | End: 2020-11-29 | Stop reason: HOSPADM

## 2020-11-26 RX ORDER — SODIUM PHOSPHATE, DIBASIC AND SODIUM PHOSPHATE, MONOBASIC 7; 19 G/133ML; G/133ML
1 ENEMA RECTAL
Qty: 1 BOTTLE | Refills: 0 | Status: SHIPPED | OUTPATIENT
Start: 2020-11-26 | End: 2020-11-26

## 2020-11-26 RX ORDER — DIVALPROEX SODIUM 500 MG/1
500 TABLET, EXTENDED RELEASE ORAL NIGHTLY
Status: DISCONTINUED | OUTPATIENT
Start: 2020-11-26 | End: 2020-11-29 | Stop reason: HOSPADM

## 2020-11-26 RX ADMIN — SODIUM CHLORIDE 500 ML: 9 INJECTION, SOLUTION INTRAVENOUS at 16:16

## 2020-11-26 RX ADMIN — ONDANSETRON 4 MG: 2 INJECTION INTRAMUSCULAR; INTRAVENOUS at 02:43

## 2020-11-26 RX ADMIN — GABAPENTIN 100 MG: 100 CAPSULE ORAL at 21:53

## 2020-11-26 RX ADMIN — BUSPIRONE HYDROCHLORIDE 5 MG: 5 TABLET ORAL at 21:53

## 2020-11-26 RX ADMIN — LAMOTRIGINE 200 MG: 100 TABLET ORAL at 21:53

## 2020-11-26 RX ADMIN — FERROUS SULFATE TAB 325 MG (65 MG ELEMENTAL FE) 325 MG: 325 (65 FE) TAB at 16:16

## 2020-11-26 RX ADMIN — PIPERACILLIN AND TAZOBACTAM 3.38 G: 3; .375 INJECTION, POWDER, LYOPHILIZED, FOR SOLUTION INTRAVENOUS at 11:41

## 2020-11-26 RX ADMIN — IOPAMIDOL 100 ML: 755 INJECTION, SOLUTION INTRAVENOUS at 03:25

## 2020-11-26 RX ADMIN — SODIUM CHLORIDE 1000 ML: 9 INJECTION, SOLUTION INTRAVENOUS at 02:43

## 2020-11-26 RX ADMIN — DIVALPROEX SODIUM 500 MG: 500 TABLET, EXTENDED RELEASE ORAL at 21:53

## 2020-11-26 RX ADMIN — PIPERACILLIN AND TAZOBACTAM 3.38 G: 3; .375 INJECTION, POWDER, LYOPHILIZED, FOR SOLUTION INTRAVENOUS at 21:53

## 2020-11-26 ASSESSMENT — PAIN SCALES - GENERAL
PAINLEVEL_OUTOF10: 0

## 2020-11-26 NOTE — ED PROVIDER NOTES
HPI:  11/26/20, Time: 11:14 AM KIZZY Ashley is a 44 y.o. male presenting to the ED for multidrug-resistant UTI, beginning several days ago. The complaint has been persistent, moderate in severity, and worsened by nothing. No fever no chills. No nausea no vomiting. He did recently have diarrhea. He had a cath specimen done 2 days ago. Results show multidrug-resistant he has mental retardation    ROS:   Pertinent positives and negatives are stated within HPI, all other systems reviewed and are negative.  --------------------------------------------- PAST HISTORY ---------------------------------------------  Past Medical History:  has a past medical history of CKD (chronic kidney disease), Diabetes mellitus ([facility] [address] Has a tremor, Hyperlipidemia, Hypokalemia, Intellectual disability, Kidney stone, Lennox-Gastaut syndrome ([facility] [address] Paralytic ileus ([facility] [address] Thyroid disease, and Venous thrombosis and embolism. Past Surgical History:  has a past surgical history that includes Lithotripsy (07/30/2020). Social History:  reports that he has never smoked. He has never used smokeless tobacco. He reports that he does not drink alcohol or use drugs. Family History: family history is not on file. The patients home medications have been reviewed. Allergies: Cefazolin; Celontin [methsuximide]; Duricef [cefadroxil]; Simvastatin; Tramadol; and Vicodin [hydrocodone-acetaminophen]    ---------------------------------------------------PHYSICAL EXAM--------------------------------------     Constitutional/General: Alert and oriented x3, well appearing, non toxic in NAD  Head: Normocephalic and atraumatic  Eyes: PERRL, EOMI  Mouth: Oropharynx clear, handling secretions, no trismus  Neck: Supple, full ROM, non tender to palpation in the midline, no stridor, no crepitus, no meningeal signs  Pulmonary: Lungs clear to auscultation bilaterally, no wheezes, rales, or rhonchi.  Not in respiratory distress  Cardiovascular:  Regular rate. Regular rhythm. No murmurs, gallops, or rubs. 2+ distal pulses  Chest: no chest wall tenderness  Abdomen: Soft. Non tender. Non distended. +BS. No rebound, guarding, or rigidity. No pulsatile masses appreciated. Musculoskeletal: Moves all extremities x 4. Warm and well perfused, no clubbing, cyanosis, or edema. Capillary refill <3 seconds  Skin: warm and dry. No rashes. Neurologic: GCS 15, CN 2-12 grossly intact, no focal deficits, symmetric strength 5/5 in the upper and lower extremities bilaterally the patient has a tremor in both hands  Psych: Normal Affect    -------------------------------------------------- RESULTS -------------------------------------------------  I have personally reviewed all laboratory and imaging results for this patient. Results are listed below.      LABS:  Results for orders placed or performed during the hospital encounter of 11/26/20   CBC Auto Differential   Result Value Ref Range    WBC 7.3 4.8 - 10.8 K/uL    RBC 4.21 (L) 4.70 - 6.10 M/uL    Hemoglobin 12.3 (L) 14.0 - 18.0 g/dL    Hematocrit 37.6 (L) 42.0 - 52.0 %    MCV 89.4 80.0 - 100.0 fL    MCH 29.3 27.0 - 31.3 pg    MCHC 32.8 (L) 33.0 - 37.0 %    RDW 15.9 (H) 11.5 - 14.5 %    Platelets 714 447 - 849 K/uL    Neutrophils % 61.4 %    Lymphocytes % 20.2 %    Monocytes % 12.2 %    Eosinophils % 5.7 %    Basophils % 0.5 %    Neutrophils Absolute 4.5 1.4 - 6.5 K/uL    Lymphocytes Absolute 1.5 1.0 - 4.8 K/uL    Monocytes Absolute 0.9 (H) 0.2 - 0.8 K/uL    Eosinophils Absolute 0.4 0.0 - 0.7 K/uL    Basophils Absolute 0.0 0.0 - 0.2 K/uL   Comprehensive Metabolic Panel w/ Reflex to MG   Result Value Ref Range    Sodium 140 135 - 144 mEq/L    Potassium reflex Magnesium 4.3 3.4 - 4.9 mEq/L    Chloride 104 95 - 107 mEq/L    CO2 24 20 - 31 mEq/L    Anion Gap 12 9 - 15 mEq/L    Glucose 90 70 - 99 mg/dL    BUN 17 6 - 20 mg/dL    CREATININE 0.80 0.70 - 1.20 mg/dL    GFR Non-African American >60.0 >60    GFR  >60.0 >60    Calcium 9.4 8.5 - 9.9 mg/dL    Total Protein 6.8 6.3 - 8.0 g/dL    Alb 4.0 3.5 - 4.6 g/dL    Total Bilirubin 0.4 0.2 - 0.7 mg/dL    Alkaline Phosphatase 52 35 - 104 U/L    ALT <5 0 - 41 U/L    AST 10 0 - 40 U/L    Globulin 2.8 2.3 - 3.5 g/dL   Lactic Acid, Plasma   Result Value Ref Range    Lactic Acid 1.7 0.5 - 2.2 mmol/L   COVID-19   Result Value Ref Range    SARS-CoV-2, NAAT Not Detected Not Detected       RADIOLOGY:  Interpreted by Radiologist.  No orders to display           ------------------------- NURSING NOTES AND VITALS REVIEWED ---------------------------   The nursing notes within the ED encounter and vital signs as below have been reviewed by myself. /82   Pulse 76   Temp 98.1 °F (36.7 °C) (Axillary)   Resp 18   SpO2 95%   Oxygen Saturation Interpretation: Normal    The patients available past medical records and past encounters were reviewed. ------------------------------ ED COURSE/MEDICAL DECISION MAKING----------------------  Medications   piperacillin-tazobactam (ZOSYN) 3.375 g in dextrose 5 % 50 mL IVPB (mini-bag) (0 g Intravenous Stopped 11/26/20 1231)             Medical Decision Making:    Urine culture results were reviewed. I did not repeat a urine today he was started on intravenous Zosyn. Lactic acid and blood cultures were obtained        Consultations:             A phone consult was placed to the hospitalist all lab work and urine findings were reviewed he will admit the patient for intravenous antibiotics to a general medical bed    Critical Care: This patient's ED course included: re-evaluation prior to disposition, IV medications and a personal history and physicial eaxmination    This patient has remained hemodynamically stable and been closely monitored during their ED course. Counseling:    The emergency provider has spoken with the patient and discussed todays results, in addition to providing specific details for the plan of care and counseling regarding the diagnosis and prognosis. Questions are answered at this time and they are agreeable with the plan.       --------------------------------- IMPRESSION AND DISPOSITION ---------------------------------    IMPRESSION  1. Acute cystitis without hematuria        DISPOSITION  Disposition: Admit to med/surg floor  Patient condition is good        NOTE: This report was transcribed using voice recognition software.  Every effort was made to ensure accuracy; however, inadvertent computerized transcription errors may be present          Lv Iglesias MD  11/26/20 4794

## 2020-11-26 NOTE — ED PROVIDER NOTES
eMERGENCY dEPARTMENT eNCOUnter      [facility]    Chief Complaint   Patient presents with    Abdominal Pain     small bowel obstruction, vomiting stool? HPI    Radhika Ramirez is a 44 y.o. male who presentsto ED from nursing home with hx of SBO , Seizures, CKD, DM  By EMS  With complaint of nausea, vomiting  Onset 1 day  Intensity of symptoms moderate  Patient had a few episodes of vomiting which per staff was fecal in nature. Patient denies any abdominal pain, fever, chills, UTI symptoms, CP, SOB      PAST MEDICAL HISTORY    Past Medical History:   Diagnosis Date    CKD (chronic kidney disease)     Diabetes mellitus ([address]     Has a tremor     Hyperlipidemia     Hypokalemia     Intellectual disability     Kidney stone     Lennox-Gastaut syndrome (HCC)     Paralytic ileus (HCC)     Thyroid disease     Venous thrombosis and embolism        SURGICAL HISTORY    Past Surgical History:   Procedure Laterality Date    LITHOTRIPSY  07/30/2020       CURRENT MEDICATIONS    Current Outpatient Rx   Medication Sig Dispense Refill    Magnesium Citrate 1.745 GM/30ML solution Take 150 mLs by mouth 2 times daily as needed for Constipation (Take 150 ml by mouth x1 when necessary for constipation. May repeat this x1 one again at no results in 4 hours.) 2 Bottle 0    Sodium Phosphates (FLEET) 7-19 GM/118ML Place 1 enema rectally once as needed (Constipation) 1 Bottle 0    polyethylene glycol (GLYCOLAX) 17 g packet Take 17 g by mouth daily as needed for Constipation 527 g 0    ondansetron (ZOFRAN ODT) 4 MG disintegrating tablet Take 1 tablet by mouth every 8 hours as needed for Nausea 20 tablet 0    gabapentin (NEURONTIN) 100 MG capsule Take 1 capsule by mouth 3 times daily for 30 days.  90 capsule 2    ferrous sulfate (IRON 325) 325 (65 Fe) MG tablet Take 1 tablet by mouth 2 times daily (with meals) 30 tablet 3    baclofen (LIORESAL) 10 MG tablet Take 10 mg by mouth every morning      divalproex (DEPAKOTE ER) 250 MG extended release tablet Take 750 mg by mouth 2 times daily      divalproex (DEPAKOTE ER) 500 MG extended release tablet Take 500 mg by mouth nightly      Bisacodyl (DULCOLAX RE) Place 1 Units rectally daily as needed (constipation)      lamoTRIgine (LAMICTAL) 200 MG tablet Take 200 mg by mouth 2 times daily      magnesium hydroxide (MILK OF MAGNESIA) 400 MG/5ML suspension Take 30 mLs by mouth daily as needed for Constipation      propranolol (INDERAL LA) 60 MG extended release capsule Take 60 mg by mouth daily      levothyroxine (SYNTHROID) 50 MCG tablet Take 50 mcg by mouth Daily      loratadine (CLARITIN) 10 MG capsule Take by mouth daily         ALLERGIES    Allergies   Allergen Reactions    Cefazolin     Celontin [Methsuximide]     Duricef [Cefadroxil]     Simvastatin     Tramadol Other (See Comments)     Other reaction(s): Other: See Comments  \"causes seizures\"  Causes seizures      Vicodin [Hydrocodone-Acetaminophen]        FAMILY HISTORY    History reviewed. No pertinent family history.     SOCIAL HISTORY    Social History     Socioeconomic History    Marital status: Single     Spouse name: None    Number of children: None    Years of education: None    Highest education level: None   Occupational History    None   Social Needs    Financial resource strain: None    Food insecurity     Worry: None     Inability: None    Transportation needs     Medical: None     Non-medical: None   Tobacco Use    Smoking status: Never Smoker    Smokeless tobacco: Never Used   Substance and Sexual Activity    Alcohol use: Never     Frequency: Never    Drug use: Never    Sexual activity: Not Currently   Lifestyle    Physical activity     Days per week: None     Minutes per session: None    Stress: None   Relationships    Social connections     Talks on phone: None     Gets together: None     Attends Gnosticism service: None     Active member of club or organization: None Attends meetings of clubs or organizations: None     Relationship status: None    Intimate partner violence     Fear of current or ex partner: None     Emotionally abused: None     Physically abused: None     Forced sexual activity: None   Other Topics Concern    None   Social History Narrative    None       REVIEW OF SYSTEMS    Constitutional:  Denies fever, chills, weight loss or weakness   Eyes:  Denies photophobia or discharge   HENT:  Denies sore throat or ear pain   Respiratory:  Denies cough or shortness of breath   Cardiovascular:  Denies chest pain, palpitations or swelling   GI:  Denies abdominal pain, but has nausea, vomiting  Musculoskeletal:  Denies back pain   Skin:  Denies rash   Neurologic:  Denies headache, focal weakness or sensory changes   Endocrine:  Denies polyuria or polydypsia   Lymphatic:  Denies swollen glands   Psychiatric:  Denies depression, suicidal ideation or homicidal ideation   All systems negative except as marked. PHYSICAL EXAM    VITAL SIGNS: /75   Pulse 73   Temp 97.5 °F (36.4 °C) (Temporal)   Resp 12   Ht 6' (1.829 m)   Wt 240 lb (108.9 kg)   SpO2 96%   BMI 32.55 kg/m²    Constitutional:  Well developed, Well nourished, No acute distress, Non-toxic appearance. HENT:  Normocephalic, Atraumatic, Bilateral external ears normal, Oropharynx moist, No oral exudates, Nose normal. Neck- Normal range of motion, No tenderness, Supple, No stridor. Eyes:  PERRL, EOMI, Conjunctiva normal, No discharge. Respiratory:  Normal breath sounds, No respiratory distress, No wheezing, No chest tenderness. Cardiovascular:  Normal heart rate, Normal rhythm, No murmurs, No rubs, No gallops. GI:  Bowel sounds normal, Soft, No tenderness, No masses, No pulsatile masses. :  No CVA tenderness. Musculoskeletal:  Intact distal pulses, No edema, No tenderness, No cyanosis, No clubbing. Good range of motion in all major joints.  No tenderness to palpation or major deformities noted. Back- No tenderness. Integument:  Warm, Dry, No erythema, No rash. Lymphatic:  No lymphadenopathy noted. Neurologic:  Alert & oriented x 3, Normal motor function, Normal sensory function, No focal deficits noted. Psychiatric:  Affect normal, Judgment normal, Mood normal.         RADIOLOGY    CT ABDOMEN PELVIS W IV CONTRAST Additional Contrast? None    (Results Pending)       REEVALUATION   Patient was updated the results of labs and Radiology. Labs  Labs Reviewed   CBC WITH AUTO DIFFERENTIAL - Abnormal; Notable for the following components:       Result Value    RBC 4.38 (*)     Hemoglobin 13.0 (*)     Hematocrit 39.5 (*)     RDW 16.1 (*)     Neutrophils Absolute 7.0 (*)     All other components within normal limits   COMPREHENSIVE METABOLIC PANEL W/ REFLEX TO MG FOR LOW K - Abnormal; Notable for the following components:    Glucose 104 (*)     Calcium 10.1 (*)     All other components within normal limits   LIPASE   TROPONIN   PROTIME-INR   APTT   BRAIN NATRIURETIC PEPTIDE   URINALYSIS   LAMOTRIGINE LEVEL   VALPROIC ACID LEVEL, FREE   TSH WITHOUT REFLEX             Summation      Patient Course:     ED Medications administered this visit:    Medications   0.9 % sodium chloride bolus (1,000 mLs Intravenous New Bag 11/26/20 0243)   ondansetron (ZOFRAN) injection 4 mg (4 mg Intravenous Given 11/26/20 0243)   iopamidol (ISOVUE-370) 76 % injection 100 mL (100 mLs Intravenous Given 11/26/20 0325)       New Prescriptions from this visit:    New Prescriptions    MAGNESIUM CITRATE 1.745 GM/30ML SOLUTION    Take 150 mLs by mouth 2 times daily as needed for Constipation (Take 150 ml by mouth x1 when necessary for constipation.  May repeat this x1 one again at no results in 4 hours.)    POLYETHYLENE GLYCOL (GLYCOLAX) 17 G PACKET    Take 17 g by mouth daily as needed for Constipation    SODIUM PHOSPHATES (FLEET) 7-19 GM/118ML    Place 1 enema rectally once as needed (Constipation)       Follow-up:  Bertram Randall [place], [facility]  [phone]    Call in 1 day          Final Impression:   1.  Constipation, unspecified constipation type               (Please note that portions of this note were completed with a voice recognition program.  Efforts were made to edit the dictations but occasionally words are mis-transcribed.)          Fransisco Galan MD  11/26/20 3338

## 2020-11-26 NOTE — ED TRIAGE NOTES
Pt sent from [address] [place] [address] for critical values and diagnosis of UTI. Pt arrived with IV and lab work. [facility] called with report and stated the Pt needed IV ATB and they could not get the Pt medication today because their agency was closed. Pt arrived via EMS with no c/o pain upon arrival.  Pt was seen in ER earlier today for same c/o UTI.

## 2020-11-27 PROCEDURE — 87449 NOS EACH ORGANISM AG IA: CPT

## 2020-11-27 PROCEDURE — 2580000003 HC RX 258: Performed by: INTERNAL MEDICINE

## 2020-11-27 PROCEDURE — 6360000002 HC RX W HCPCS: Performed by: INTERNAL MEDICINE

## 2020-11-27 PROCEDURE — 87324 CLOSTRIDIUM AG IA: CPT

## 2020-11-27 PROCEDURE — 6370000000 HC RX 637 (ALT 250 FOR IP): Performed by: INTERNAL MEDICINE

## 2020-11-27 PROCEDURE — 1210000000 HC MED SURG R&B

## 2020-11-27 RX ORDER — L. ACIDOPHILUS/L.BULGARICUS 1MM CELL
3 TABLET ORAL 3 TIMES DAILY
Status: DISCONTINUED | OUTPATIENT
Start: 2020-11-27 | End: 2020-11-29 | Stop reason: HOSPADM

## 2020-11-27 RX ORDER — SODIUM CHLORIDE 9 MG/ML
INJECTION, SOLUTION INTRAVENOUS CONTINUOUS
Status: DISPENSED | OUTPATIENT
Start: 2020-11-27 | End: 2020-11-28

## 2020-11-27 RX ADMIN — LACTOBACILLUS TAB 3 TABLET: TAB at 22:29

## 2020-11-27 RX ADMIN — BUSPIRONE HYDROCHLORIDE 5 MG: 5 TABLET ORAL at 22:28

## 2020-11-27 RX ADMIN — FERROUS SULFATE TAB 325 MG (65 MG ELEMENTAL FE) 325 MG: 325 (65 FE) TAB at 09:21

## 2020-11-27 RX ADMIN — GABAPENTIN 100 MG: 100 CAPSULE ORAL at 09:21

## 2020-11-27 RX ADMIN — MEROPENEM 1 G: 1 INJECTION, POWDER, FOR SOLUTION INTRAVENOUS at 18:23

## 2020-11-27 RX ADMIN — LACTOBACILLUS TAB 3 TABLET: TAB at 13:55

## 2020-11-27 RX ADMIN — BUSPIRONE HYDROCHLORIDE 5 MG: 5 TABLET ORAL at 09:21

## 2020-11-27 RX ADMIN — GABAPENTIN 100 MG: 100 CAPSULE ORAL at 22:28

## 2020-11-27 RX ADMIN — DIVALPROEX SODIUM 750 MG: 250 TABLET, FILM COATED, EXTENDED RELEASE ORAL at 13:56

## 2020-11-27 RX ADMIN — GABAPENTIN 100 MG: 100 CAPSULE ORAL at 13:55

## 2020-11-27 RX ADMIN — SODIUM CHLORIDE: 900 INJECTION, SOLUTION INTRAVENOUS at 10:26

## 2020-11-27 RX ADMIN — DIVALPROEX SODIUM 750 MG: 250 TABLET, FILM COATED, EXTENDED RELEASE ORAL at 09:21

## 2020-11-27 RX ADMIN — DIVALPROEX SODIUM 500 MG: 500 TABLET, EXTENDED RELEASE ORAL at 22:28

## 2020-11-27 RX ADMIN — LAMOTRIGINE 200 MG: 100 TABLET ORAL at 09:21

## 2020-11-27 RX ADMIN — BACLOFEN 10 MG: 10 TABLET ORAL at 09:21

## 2020-11-27 RX ADMIN — PIPERACILLIN AND TAZOBACTAM 3.38 G: 3; .375 INJECTION, POWDER, LYOPHILIZED, FOR SOLUTION INTRAVENOUS at 03:41

## 2020-11-27 RX ADMIN — MEROPENEM 1 G: 1 INJECTION, POWDER, FOR SOLUTION INTRAVENOUS at 09:34

## 2020-11-27 RX ADMIN — LAMOTRIGINE 200 MG: 100 TABLET ORAL at 22:29

## 2020-11-27 RX ADMIN — LEVOTHYROXINE SODIUM 50 MCG: 25 TABLET ORAL at 05:19

## 2020-11-27 RX ADMIN — LACTOBACILLUS TAB 3 TABLET: TAB at 09:37

## 2020-11-27 ASSESSMENT — PAIN SCALES - WONG BAKER: WONGBAKER_NUMERICALRESPONSE: 0

## 2020-11-27 ASSESSMENT — PAIN - FUNCTIONAL ASSESSMENT: PAIN_FUNCTIONAL_ASSESSMENT: PREVENTS OR INTERFERES SOME ACTIVE ACTIVITIES AND ADLS

## 2020-11-27 ASSESSMENT — PAIN DESCRIPTION - PROGRESSION: CLINICAL_PROGRESSION: NOT CHANGED

## 2020-11-27 NOTE — PLAN OF CARE
Problem: SAFETY  Goal: Free from accidental physical injury  Outcome: Met This Shift  Goal: Free from intentional harm  Outcome: Met This Shift     Problem: DAILY CARE  Goal: Daily care needs are met  Outcome: Met This Shift     Problem: PAIN  Goal: Patient's pain/discomfort is manageable  Outcome: Met This Shift

## 2020-11-27 NOTE — H&P
Hospital Medicine  History and Physical    Patient:  Cuong Don  MRN: [id number]    CHIEF COMPLAINT:    Chief Complaint   Patient presents with    Urinary Tract Infection     diagnosed with \"critical\" UTI from [address] [place] [address] and needing IV ATB       History Obtained From:  Patient, EMR  Primary Care Physician: Denise Porter MD    HISTORY OF PRESENT ILLNESS:   The patient is a 44 y.o. male with PMH of intellectual disability who lives at the group home. He was diagnosed having ESBL UTI. No oral antibiotic coverage therefore the decision was made to admit him for intravenous antibiotic. Patient has intellectual disability and unable to provide information. Patient is not allowing me to examine him. Every time I try to palpate his abdomen he pushes me away stating that he wants to sleep. Past Medical History:      Diagnosis Date    CKD (chronic kidney disease)     Diabetes mellitus ([address]     Has a tremor     Hyperlipidemia     Hypokalemia     Intellectual disability     Kidney stone     Lennox-Gastaut syndrome (HCC)     [facility]alytic [facility]us ([facility]     Thyroid disease     Venous thrombosis and embolism        Past Surgical History:      Procedure Laterality Date    LITHOTRIPSY  07/30/2020       Medications Prior to Admission:    Prior to Admission medications    Medication Sig Start Date End Date Taking? Authorizing Provider   busPIRone (BUSPAR) 5 MG tablet Take 5 mg by mouth 2 times daily   Yes Historical Provider, MD   calcium carbonate (TUMS EX) 750 MG chewable tablet Take 1 tablet by mouth every 4 hours as needed for Heartburn   Yes Historical Provider, MD   gabapentin (NEURONTIN) 100 MG capsule Take 1 capsule by mouth 3 times daily for 30 days.  11/4/20 12/4/20 Yes Giana Mackey MD   ferrous sulfate (IRON 325) 325 (65 Fe) MG tablet Take 1 tablet by mouth 2 times daily (with meals) 8/3/20  Yes Jeanna Oppenheim, MD   baclofen (LIORESAL) 10 MG tablet Take 10 mg by mouth every morning   Yes Historical Provider, MD   divalproex (DEPAKOTE ER) 250 MG extended release tablet Take 750 mg by mouth 2 times daily   Yes Historical Provider, MD   divalproex (DEPAKOTE ER) 500 MG extended release tablet Take 500 mg by mouth nightly   Yes Historical Provider, MD   lamoTRIgine (LAMICTAL) 200 MG tablet Take 200 mg by mouth 2 times daily   Yes Historical Provider, MD   propranolol (INDERAL LA) 60 MG extended release capsule Take 60 mg by mouth daily   Yes Historical Provider, MD   levothyroxine (SYNTHROID) 50 MCG tablet Take 50 mcg by mouth Daily   Yes Historical Provider, MD   loratadine (CLARITIN) 10 MG capsule Take by mouth daily    Historical Provider, MD   Magnesium Citrate 1.745 GM/30ML solution Take 150 mLs by mouth 2 times daily as needed for Constipation (Take 150 ml by mouth x1 when necessary for constipation. May repeat this x1 one again at no results in 4 hours.) 11/26/20 11/28/20  Richard Anton MD   polyethylene glycol (GLYCOLAX) 17 g packet Take 17 g by mouth daily as needed for Constipation 11/26/20 12/26/20  Richard Anton MD   acetaminophen (TYLENOL) 325 MG tablet Take 650 mg by mouth every 4 hours as needed for Pain or Fever (pain or temp greater than 100)    Historical Provider, MD   guaiFENesin (MUCINEX) 600 MG extended release tablet Take 600 mg by mouth 2 times daily as needed for Congestion For chest congestion    Historical Provider, MD   ondansetron (ZOFRAN ODT) 4 MG disintegrating tablet Take 1 tablet by mouth every 8 hours as needed for Nausea 11/18/20   Nirmala Daniel DO   Bisacodyl (DULCOLAX RE) Place 1 Units rectally daily as needed (constipation)    Historical Provider, MD   magnesium hydroxide (MILK OF MAGNESIA) 400 MG/5ML suspension Take 30 mLs by mouth daily as needed for Constipation    Historical Provider, MD       Allergies:  Cefazolin; Celontin [methsuximide]; Duricef [cefadroxil];  Simvastatin; Tramadol; and Vicodin [hydrocodone-acetaminophen]    Social History:   TOBACCO: BACTERIA, CLARITYU, SPECGRAV, LEUKOCYTESUR, UROBILINOGEN, BILIRUBINUR, BLOODU, GLUCOSEU, AMORPHOUS in the last 72 hours. Invalid input(s): KETONESU  -----------------------------------------------------------------   Ct Abdomen Pelvis W Iv Contrast Additional Contrast? None    Result Date: 11/26/2020  CT of the Abdomen and Pelvis with intravenous contrast medium History: Abdominal pain. Technical Factors: CT imaging of the abdomen and pelvis were obtained and formatted as 5 mm contiguous axial images from the domes of the diaphragm to the symphysis pubis. Sagittal and coronal reconstructions were also obtained. Oral contrast medium:  None. Intravenous contrast medium:  Isovue-370, 100 mL. Comparison:  CT abdomen pelvis, November 18, 2020, November 7, 2020. Findings: Lungs:  Dependent right lower lobe subsegmental consolidation with small right effusion. Liver:  Normal in size, shape, and attenuation. Bile Ducts:  Normal in caliber. Gallbladder:  No stones or wall thickening. Pancreas:  Normal without masses, cysts, ductal dilatation or calcification. Spleen:  Normal in size without masses or calcifications. No splenules. Kidneys:  Normal in size and enhancement. Right kidney shows 3 mm calculus, medial aspect, midpole, cystlike areas measuring 7 mm posterior upper pole. No hydronephrosis. Left kidney shows no pelvocaliectasis perinephric fluid or calculi. Scarring posterior aspect upper pole lateral wall, and caudal aspect lower pole. Adrenals:  Normal. Small bowel:  Normal in caliber. Appendix:  Normal. Colon:  Copious stool in rectum. Rectal wall thickening measuring 8 mm. Minimal pericolonic fat stranding. Copious stool also identified distal half of sigmoid, transverse colon, and distal ascending colon with fluid filling proximal ascending colon and cecum. Peritoneum:  No ascites, free air, or fluid collections. Vessels: Aorta normal in course and caliber.    Portal vein, splenic vein, superior mesenteric vein are patent. Lymph nodes:  Retroperitoneal:  No enlarged retroperitoneal lymph nodes. Mesenteric:  No enlarged mesenteric lymph nodes. Pelvic: No enlarged pelvic lymph nodes. Ureters: Normal in course and caliber. No calcifications. Bladder: Diffuse wall thickening, measuring up to 1.2 cm. Prostate: Enlarged with transverse diameter 5.3 cm. Multiple calculi within prostate. Foci of low attenuation left lateral prostate measuring approximately 1.8 x 1.2 cm. Abdominal Wall:  No hernia identified. No diastasis of rectus musculature. No edema or masses. Bones:  No bone lesions. Diffuse disc space narrowing, L5-S1, and posteriorly L4-5 with anterior osteophyte basal. No post operative changes. Fluid proximal colon may reflect diarrheal state with contrast patient involving distal ascending colon through rectum. Copious fecal material in rectum, with wall thickening, and minimal fat stranding suggestive of stercoral colitis. Nonobstructing right renal calculus. Diffuse wall thickening urinary bladder. Findings may be secondary to bladder outlet obstruction from enlarged heterogenous prostate as discussed. Other etiologies including infectious and inflammatory change secondary consideration. Malignancy less likely. All CT scans at this facility use dose modulation, iterative reconstruction, and/or weight based dosing when appropriate to reduce radiation dose to as low as reasonably achievable. Assessment and Plan     * ESBL UTI. *Hypotension, history of hypertension. Probable systemic inflammatory reactive syndrome. *Seizure disorder. *Intellectual disability. *Hypothyroidism. *Colitis    *History of hypertension    Plan:  Given the multidrug-resistant ESBL with the lack of oral antibiotic coverage the decision was made to admit him for intravenous antibiotic. I will start him on intravenous meropenem. Check stool culture and C. Difficile. Hold his blood pressure medication. Continue IV fluid infusion. Lovenox for DVT prophylaxis. This documentation may or may not be complete, inclusive or conclusive. Patient has a broad differential diagnosis that may not all be listed here or addressed. Patient status is dynamic and the evolutionary therefore additional work-up, investigation and therapeutic intervention will be determined based on the clinical progression and follow-up test result. Patient Active Problem List   Diagnosis Code    Sepsis ([address] A41.9    Small bowel obstruction ([address] K56.609    Epileptic seizure ([address] G40.909    Hypothyroidism E03.9    HTN (hypertension) I10    Moderate intellectual disabilities F71    Chronic pain G89.29    UTI (urinary tract infection) N39.0       Aron Capellan MD  Admitting Hospitalist    TTS: 85mins where I focused more than 75% of my attention on rendering care, and planning treatment course for this patient, in addition to talking to RN team, mid levels, consulting with other physicians and following up on labs and imaging. High Risk Readmission Screening Tool Score Noted.      Emergency Contact:

## 2020-11-28 LAB
C DIFF TOXIN/ANTIGEN: NORMAL
LAMOTRIGINE LEVEL: 14.8 UG/ML (ref 2.5–15)

## 2020-11-28 PROCEDURE — 2580000003 HC RX 258: Performed by: INTERNAL MEDICINE

## 2020-11-28 PROCEDURE — 6360000002 HC RX W HCPCS: Performed by: INTERNAL MEDICINE

## 2020-11-28 PROCEDURE — 1210000000 HC MED SURG R&B

## 2020-11-28 PROCEDURE — 6370000000 HC RX 637 (ALT 250 FOR IP): Performed by: INTERNAL MEDICINE

## 2020-11-28 RX ORDER — SODIUM CHLORIDE 9 MG/ML
INJECTION, SOLUTION INTRAVENOUS CONTINUOUS
Status: DISCONTINUED | OUTPATIENT
Start: 2020-11-28 | End: 2020-11-29

## 2020-11-28 RX ADMIN — DIVALPROEX SODIUM 750 MG: 250 TABLET, FILM COATED, EXTENDED RELEASE ORAL at 15:09

## 2020-11-28 RX ADMIN — GABAPENTIN 100 MG: 100 CAPSULE ORAL at 09:01

## 2020-11-28 RX ADMIN — FERROUS SULFATE TAB 325 MG (65 MG ELEMENTAL FE) 325 MG: 325 (65 FE) TAB at 09:01

## 2020-11-28 RX ADMIN — LAMOTRIGINE 200 MG: 100 TABLET ORAL at 20:35

## 2020-11-28 RX ADMIN — GABAPENTIN 100 MG: 100 CAPSULE ORAL at 20:35

## 2020-11-28 RX ADMIN — BACLOFEN 10 MG: 10 TABLET ORAL at 09:04

## 2020-11-28 RX ADMIN — MEROPENEM 1 G: 1 INJECTION, POWDER, FOR SOLUTION INTRAVENOUS at 00:20

## 2020-11-28 RX ADMIN — MEROPENEM 1 G: 1 INJECTION, POWDER, FOR SOLUTION INTRAVENOUS at 09:03

## 2020-11-28 RX ADMIN — SODIUM CHLORIDE: 900 INJECTION, SOLUTION INTRAVENOUS at 01:20

## 2020-11-28 RX ADMIN — MEROPENEM 1 G: 1 INJECTION, POWDER, FOR SOLUTION INTRAVENOUS at 16:28

## 2020-11-28 RX ADMIN — LACTOBACILLUS TAB 3 TABLET: TAB at 13:36

## 2020-11-28 RX ADMIN — SODIUM CHLORIDE: 9 INJECTION, SOLUTION INTRAVENOUS at 09:07

## 2020-11-28 RX ADMIN — LACTOBACILLUS TAB 3 TABLET: TAB at 20:34

## 2020-11-28 RX ADMIN — GABAPENTIN 100 MG: 100 CAPSULE ORAL at 13:36

## 2020-11-28 RX ADMIN — BUSPIRONE HYDROCHLORIDE 5 MG: 5 TABLET ORAL at 08:58

## 2020-11-28 RX ADMIN — DIVALPROEX SODIUM 500 MG: 500 TABLET, EXTENDED RELEASE ORAL at 20:35

## 2020-11-28 RX ADMIN — LACTOBACILLUS TAB 3 TABLET: TAB at 09:00

## 2020-11-28 RX ADMIN — DIVALPROEX SODIUM 750 MG: 250 TABLET, FILM COATED, EXTENDED RELEASE ORAL at 09:02

## 2020-11-28 RX ADMIN — BUSPIRONE HYDROCHLORIDE 5 MG: 5 TABLET ORAL at 20:34

## 2020-11-28 RX ADMIN — LAMOTRIGINE 200 MG: 100 TABLET ORAL at 08:59

## 2020-11-28 RX ADMIN — FERROUS SULFATE TAB 325 MG (65 MG ELEMENTAL FE) 325 MG: 325 (65 FE) TAB at 16:28

## 2020-11-28 ASSESSMENT — PAIN DESCRIPTION - LOCATION: LOCATION: ARM

## 2020-11-28 ASSESSMENT — PAIN DESCRIPTION - PROGRESSION
CLINICAL_PROGRESSION: GRADUALLY WORSENING
CLINICAL_PROGRESSION: NOT CHANGED

## 2020-11-28 ASSESSMENT — PAIN DESCRIPTION - FREQUENCY: FREQUENCY: CONTINUOUS

## 2020-11-28 ASSESSMENT — PAIN DESCRIPTION - ONSET: ONSET: ON-GOING

## 2020-11-28 ASSESSMENT — PAIN - FUNCTIONAL ASSESSMENT: PAIN_FUNCTIONAL_ASSESSMENT: PREVENTS OR INTERFERES SOME ACTIVE ACTIVITIES AND ADLS

## 2020-11-28 ASSESSMENT — PAIN DESCRIPTION - PAIN TYPE: TYPE: ACUTE PAIN

## 2020-11-28 ASSESSMENT — PAIN SCALES - WONG BAKER
WONGBAKER_NUMERICALRESPONSE: 2
WONGBAKER_NUMERICALRESPONSE: 0

## 2020-11-28 ASSESSMENT — PAIN DESCRIPTION - ORIENTATION: ORIENTATION: LEFT

## 2020-11-28 ASSESSMENT — PAIN DESCRIPTION - DESCRIPTORS: DESCRIPTORS: ACHING;SORE

## 2020-11-28 NOTE — FLOWSHEET NOTE
Patient continues to refuse dinner. Patient is distracted with his movie and states, \" I am not very hungry right now\". Will continue to encourage PO intake.

## 2020-11-28 NOTE — PROGRESS NOTES
Pt noted to have moderate agitation with care this evening. Pt previously refused PM scheduled medications. This nurse informed pt on purpose of medication and pt continues to refuse. Mother informed on and spoke with pt per pt's request. Mother spoke with pt briefly and pt noted to become calm. Soon after agreed to take oral medications. No further complaints voiced. Call light within reach. Safety maintained.

## 2020-11-28 NOTE — PROGRESS NOTES
Pt seen in bed resting with eyes closed. Easily awakened with verbal stimuli. Pt noted to be agitated after awakened refusing care, vitals, and AM medications at this time. Will attempt to inform mother. No further complaints voiced. Will continue to monitor.

## 2020-11-29 ENCOUNTER — HOSPITAL ENCOUNTER (INPATIENT)
Age: 50
LOS: 4 days | Discharge: HOME HEALTH | DRG: 690 | End: 2020-12-03
Attending: INTERNAL MEDICINE | Admitting: INTERNAL MEDICINE
Payer: MEDICARE

## 2020-11-29 VITALS
RESPIRATION RATE: 16 BRPM | WEIGHT: 227.9 LBS | SYSTOLIC BLOOD PRESSURE: 133 MMHG | DIASTOLIC BLOOD PRESSURE: 84 MMHG | HEART RATE: 72 BPM | OXYGEN SATURATION: 97 % | BODY MASS INDEX: 30.87 KG/M2 | HEIGHT: 72 IN | TEMPERATURE: 97.7 F

## 2020-11-29 LAB
ANION GAP SERPL CALCULATED.3IONS-SCNC: 9 MEQ/L (ref 9–15)
BASOPHILS ABSOLUTE: 0 K/UL (ref 0–0.2)
BASOPHILS RELATIVE PERCENT: 0.7 %
BUN BLDV-MCNC: 11 MG/DL (ref 6–20)
CALCIUM SERPL-MCNC: 8.8 MG/DL (ref 8.5–9.9)
CHLORIDE BLD-SCNC: 107 MEQ/L (ref 95–107)
CO2: 27 MEQ/L (ref 20–31)
CREAT SERPL-MCNC: 0.66 MG/DL (ref 0.7–1.2)
EOSINOPHILS ABSOLUTE: 0.3 K/UL (ref 0–0.7)
EOSINOPHILS RELATIVE PERCENT: 5.9 %
GFR AFRICAN AMERICAN: >60
GFR NON-AFRICAN AMERICAN: >60
GLUCOSE BLD-MCNC: 65 MG/DL (ref 70–99)
HCT VFR BLD CALC: 33 % (ref 42–52)
HEMOGLOBIN: 10.7 G/DL (ref 14–18)
LYMPHOCYTES ABSOLUTE: 1.7 K/UL (ref 1–4.8)
LYMPHOCYTES RELATIVE PERCENT: 38.6 %
MCH RBC QN AUTO: 29.2 PG (ref 27–31.3)
MCHC RBC AUTO-ENTMCNC: 32.5 % (ref 33–37)
MCV RBC AUTO: 89.8 FL (ref 80–100)
MONOCYTES ABSOLUTE: 0.6 K/UL (ref 0.2–0.8)
MONOCYTES RELATIVE PERCENT: 12.2 %
NEUTROPHILS ABSOLUTE: 1.9 K/UL (ref 1.4–6.5)
NEUTROPHILS RELATIVE PERCENT: 42.6 %
PDW BLD-RTO: 15.8 % (ref 11.5–14.5)
PLATELET # BLD: 192 K/UL (ref 130–400)
POTASSIUM SERPL-SCNC: 3.4 MEQ/L (ref 3.4–4.9)
RBC # BLD: 3.67 M/UL (ref 4.7–6.1)
SODIUM BLD-SCNC: 143 MEQ/L (ref 135–144)
VALPROIC ACID % FREE: 22 % (ref 5–18)
VALPROIC ACID TOTAL: 90 UG/ML (ref 50–125)
VALPROIC ACID, FREE: 20 UG/ML (ref 7–23)
WBC # BLD: 4.5 K/UL (ref 4.8–10.8)

## 2020-11-29 PROCEDURE — 2580000003 HC RX 258: Performed by: INTERNAL MEDICINE

## 2020-11-29 PROCEDURE — 6370000000 HC RX 637 (ALT 250 FOR IP): Performed by: INTERNAL MEDICINE

## 2020-11-29 PROCEDURE — 87506 IADNA-DNA/RNA PROBE TQ 6-11: CPT

## 2020-11-29 PROCEDURE — 80048 BASIC METABOLIC PNL TOTAL CA: CPT

## 2020-11-29 PROCEDURE — 85025 COMPLETE CBC W/AUTO DIFF WBC: CPT

## 2020-11-29 PROCEDURE — 6360000002 HC RX W HCPCS: Performed by: INTERNAL MEDICINE

## 2020-11-29 PROCEDURE — 1200000002 HC SEMI PRIVATE SWING BED

## 2020-11-29 PROCEDURE — 36415 COLL VENOUS BLD VENIPUNCTURE: CPT

## 2020-11-29 RX ORDER — ACETAMINOPHEN 325 MG/1
650 TABLET ORAL EVERY 6 HOURS PRN
Status: CANCELLED | OUTPATIENT
Start: 2020-11-29

## 2020-11-29 RX ORDER — ACETAMINOPHEN 325 MG/1
650 TABLET ORAL EVERY 6 HOURS PRN
Status: DISCONTINUED | OUTPATIENT
Start: 2020-11-29 | End: 2020-12-03 | Stop reason: HOSPADM

## 2020-11-29 RX ORDER — CALCIUM CARBONATE 200(500)MG
1 TABLET,CHEWABLE ORAL EVERY 4 HOURS PRN
Status: CANCELLED | OUTPATIENT
Start: 2020-11-29

## 2020-11-29 RX ORDER — GABAPENTIN 100 MG/1
100 CAPSULE ORAL 3 TIMES DAILY
Status: DISCONTINUED | OUTPATIENT
Start: 2020-11-29 | End: 2020-12-03 | Stop reason: HOSPADM

## 2020-11-29 RX ORDER — FERROUS SULFATE 325(65) MG
325 TABLET ORAL 2 TIMES DAILY WITH MEALS
Status: CANCELLED | OUTPATIENT
Start: 2020-11-29

## 2020-11-29 RX ORDER — ONDANSETRON 4 MG/1
4 TABLET, ORALLY DISINTEGRATING ORAL EVERY 8 HOURS PRN
Status: DISCONTINUED | OUTPATIENT
Start: 2020-11-29 | End: 2020-12-03 | Stop reason: HOSPADM

## 2020-11-29 RX ORDER — DIVALPROEX SODIUM 500 MG/1
500 TABLET, EXTENDED RELEASE ORAL NIGHTLY
Status: DISCONTINUED | OUTPATIENT
Start: 2020-11-29 | End: 2020-12-03 | Stop reason: HOSPADM

## 2020-11-29 RX ORDER — BISACODYL 10 MG
10 SUPPOSITORY, RECTAL RECTAL DAILY PRN
Status: DISCONTINUED | OUTPATIENT
Start: 2020-11-29 | End: 2020-12-03 | Stop reason: HOSPADM

## 2020-11-29 RX ORDER — BISACODYL 10 MG
10 SUPPOSITORY, RECTAL RECTAL DAILY PRN
Status: CANCELLED | OUTPATIENT
Start: 2020-11-29

## 2020-11-29 RX ORDER — LAMOTRIGINE 100 MG/1
200 TABLET ORAL 2 TIMES DAILY
Status: CANCELLED | OUTPATIENT
Start: 2020-11-29

## 2020-11-29 RX ORDER — L. ACIDOPHILUS/L.BULGARICUS 1MM CELL
3 TABLET ORAL 3 TIMES DAILY
Status: CANCELLED | OUTPATIENT
Start: 2020-11-29

## 2020-11-29 RX ORDER — POLYETHYLENE GLYCOL 3350 17 G/17G
17 POWDER, FOR SOLUTION ORAL DAILY PRN
Status: DISCONTINUED | OUTPATIENT
Start: 2020-11-29 | End: 2020-12-03 | Stop reason: HOSPADM

## 2020-11-29 RX ORDER — ONDANSETRON 4 MG/1
4 TABLET, ORALLY DISINTEGRATING ORAL EVERY 8 HOURS PRN
Status: CANCELLED | OUTPATIENT
Start: 2020-11-29

## 2020-11-29 RX ORDER — BUSPIRONE HYDROCHLORIDE 5 MG/1
5 TABLET ORAL 2 TIMES DAILY
Status: CANCELLED | OUTPATIENT
Start: 2020-11-29

## 2020-11-29 RX ORDER — BACLOFEN 10 MG/1
10 TABLET ORAL EVERY MORNING
Status: DISCONTINUED | OUTPATIENT
Start: 2020-11-30 | End: 2020-12-03 | Stop reason: HOSPADM

## 2020-11-29 RX ORDER — BACLOFEN 10 MG/1
10 TABLET ORAL EVERY MORNING
Status: CANCELLED | OUTPATIENT
Start: 2020-11-30

## 2020-11-29 RX ORDER — GABAPENTIN 100 MG/1
100 CAPSULE ORAL 3 TIMES DAILY
Status: CANCELLED | OUTPATIENT
Start: 2020-11-29

## 2020-11-29 RX ORDER — LAMOTRIGINE 100 MG/1
200 TABLET ORAL 2 TIMES DAILY
Status: DISCONTINUED | OUTPATIENT
Start: 2020-11-29 | End: 2020-12-03 | Stop reason: HOSPADM

## 2020-11-29 RX ORDER — DIVALPROEX SODIUM 250 MG/1
750 TABLET, EXTENDED RELEASE ORAL 2 TIMES DAILY
Status: CANCELLED | OUTPATIENT
Start: 2020-11-29

## 2020-11-29 RX ORDER — FERROUS SULFATE 325(65) MG
325 TABLET ORAL 2 TIMES DAILY WITH MEALS
Status: DISCONTINUED | OUTPATIENT
Start: 2020-11-30 | End: 2020-12-03 | Stop reason: HOSPADM

## 2020-11-29 RX ORDER — CALCIUM CARBONATE 200(500)MG
1 TABLET,CHEWABLE ORAL EVERY 4 HOURS PRN
Status: DISCONTINUED | OUTPATIENT
Start: 2020-11-29 | End: 2020-12-03 | Stop reason: HOSPADM

## 2020-11-29 RX ORDER — LEVOTHYROXINE SODIUM 0.03 MG/1
50 TABLET ORAL DAILY
Status: DISCONTINUED | OUTPATIENT
Start: 2020-11-30 | End: 2020-12-03 | Stop reason: HOSPADM

## 2020-11-29 RX ORDER — LEVOTHYROXINE SODIUM 0.03 MG/1
50 TABLET ORAL DAILY
Status: CANCELLED | OUTPATIENT
Start: 2020-11-30

## 2020-11-29 RX ORDER — DIVALPROEX SODIUM 250 MG/1
750 TABLET, EXTENDED RELEASE ORAL 2 TIMES DAILY
Status: DISCONTINUED | OUTPATIENT
Start: 2020-11-30 | End: 2020-12-03 | Stop reason: HOSPADM

## 2020-11-29 RX ORDER — L. ACIDOPHILUS/L.BULGARICUS 1MM CELL
3 TABLET ORAL 3 TIMES DAILY
Status: DISCONTINUED | OUTPATIENT
Start: 2020-11-29 | End: 2020-12-03 | Stop reason: HOSPADM

## 2020-11-29 RX ORDER — POLYETHYLENE GLYCOL 3350 17 G/17G
17 POWDER, FOR SOLUTION ORAL DAILY PRN
Status: CANCELLED | OUTPATIENT
Start: 2020-11-29

## 2020-11-29 RX ORDER — BUSPIRONE HYDROCHLORIDE 5 MG/1
5 TABLET ORAL 2 TIMES DAILY
Status: DISCONTINUED | OUTPATIENT
Start: 2020-11-29 | End: 2020-12-03 | Stop reason: HOSPADM

## 2020-11-29 RX ORDER — DIVALPROEX SODIUM 500 MG/1
500 TABLET, EXTENDED RELEASE ORAL NIGHTLY
Status: CANCELLED | OUTPATIENT
Start: 2020-11-29

## 2020-11-29 RX ADMIN — MEROPENEM 1 G: 1 INJECTION, POWDER, FOR SOLUTION INTRAVENOUS at 00:17

## 2020-11-29 RX ADMIN — LACTOBACILLUS TAB 3 TABLET: TAB at 20:04

## 2020-11-29 RX ADMIN — DIVALPROEX SODIUM 500 MG: 500 TABLET, EXTENDED RELEASE ORAL at 20:04

## 2020-11-29 RX ADMIN — GABAPENTIN 100 MG: 100 CAPSULE ORAL at 20:04

## 2020-11-29 RX ADMIN — LAMOTRIGINE 200 MG: 100 TABLET ORAL at 20:04

## 2020-11-29 RX ADMIN — BUSPIRONE HYDROCHLORIDE 5 MG: 5 TABLET ORAL at 09:34

## 2020-11-29 RX ADMIN — FERROUS SULFATE TAB 325 MG (65 MG ELEMENTAL FE) 325 MG: 325 (65 FE) TAB at 16:15

## 2020-11-29 RX ADMIN — MEROPENEM 1 G: 1 INJECTION, POWDER, FOR SOLUTION INTRAVENOUS at 09:40

## 2020-11-29 RX ADMIN — ENOXAPARIN SODIUM 40 MG: 40 INJECTION SUBCUTANEOUS at 09:48

## 2020-11-29 RX ADMIN — FERROUS SULFATE TAB 325 MG (65 MG ELEMENTAL FE) 325 MG: 325 (65 FE) TAB at 09:34

## 2020-11-29 RX ADMIN — MEROPENEM 1 G: 1 INJECTION, POWDER, FOR SOLUTION INTRAVENOUS at 23:58

## 2020-11-29 RX ADMIN — BACLOFEN 10 MG: 10 TABLET ORAL at 09:33

## 2020-11-29 RX ADMIN — LAMOTRIGINE 200 MG: 100 TABLET ORAL at 09:34

## 2020-11-29 RX ADMIN — MEROPENEM 1 G: 1 INJECTION, POWDER, FOR SOLUTION INTRAVENOUS at 16:15

## 2020-11-29 RX ADMIN — DIVALPROEX SODIUM 750 MG: 250 TABLET, FILM COATED, EXTENDED RELEASE ORAL at 09:33

## 2020-11-29 RX ADMIN — SODIUM CHLORIDE: 9 INJECTION, SOLUTION INTRAVENOUS at 05:23

## 2020-11-29 RX ADMIN — GABAPENTIN 100 MG: 100 CAPSULE ORAL at 09:34

## 2020-11-29 RX ADMIN — GABAPENTIN 100 MG: 100 CAPSULE ORAL at 14:38

## 2020-11-29 RX ADMIN — DIVALPROEX SODIUM 750 MG: 250 TABLET, FILM COATED, EXTENDED RELEASE ORAL at 16:07

## 2020-11-29 RX ADMIN — BUSPIRONE HYDROCHLORIDE 5 MG: 5 TABLET ORAL at 20:04

## 2020-11-29 NOTE — FLOWSHEET NOTE
4927: This RN attempted to get vitals, pass am medications, AM assessment and feed patient breakfast, Patient is refusing at this time. Patient cussing at this nurse stating that, \" I am just trying to get some sleep\"  Will attempt later this morning. Electronically signed by Jackeline Gambino RN on 11/29/2020 at 8:10 AM    1029: Patients mother is at bedside. Mother was able to calm patient, RN was able to pass AM medications per [facility]

## 2020-11-29 NOTE — PROGRESS NOTES
Pt refused to let this nurse take pt vitals, check him to change him, and give him synthroid pills. Pt stated he just wants to sleep and does not want me to do any of the above actions. Will let dayshift Rn know.    Electronically signed by Luke Hernandez RN on 11/29/2020 at 6:52 AM

## 2020-11-29 NOTE — PROGRESS NOTES
Pt refused to be checked to see if he was incontinent. Will try again in a little while. Will continue to monitor pt.   Electronically signed by Anish Colby RN on 11/28/2020 at 10:14 PM

## 2020-11-29 NOTE — PROGRESS NOTES
At this moment pt refused to allow this nurse to take pt vitals, check him to change him, and give synthroid pills. Will try again before this nurse leaves.   Electronically signed by Lorne Farnsworth RN on 11/29/2020 at 5:57 AM

## 2020-11-29 NOTE — DISCHARGE SUMMARY
Hospital Medicine Discharge Summary    Anaid Brower  :  1970  MRN:  [id number]    Admit date:  2020  Discharge date:  2020    Admitting Physician:  Elyssa Black MD  Primary Care Physician:  Luisa Pierre MD      Discharge Diagnoses:      * ESBL UTI.     *Hypotension, history of hypertension.  Probable systemic inflammatory reactive syndrome. Resolved     *Seizure disorder.     *Intellectual disability.     *Hypothyroidism.     *Colitis as seen on CT. Initially patient had loose stool but now no bowel movement. C. difficile is negative     *History of hypertension.     *DVT prophylaxis. Hospital Course:   Anaid Brower is a 44 y.o. male that was admitted and treated at [facility] for intravenous antibiotic. He was diagnosed having UTI with ESBL that is resistant to all oral antibiotic. The only intravenous antibiotic is meropenem. The patient was admitted and started on meropenem. His blood cultures are negative. Patient exhibited signs and symptoms of a systemic inflammatory reactive syndrome including hypotension. Corrected with IV fluid and IV antibiotic. Case was discussed with nursing staff as well as with patient mother. Given this ESBL recurrent infection I decided to discharge him from acute and admit him to the skilled section to complete intravenous course of meropenem for total of 10 days. Patient otherwise has intellectual disability. The patient is unable to understand the medical facts. We had to rely on his mom to convince him to stay otherwise he was shouting at the nursing staff and refusing care. Patient was seen by the following consultants while admitted to [facility]:   Consults:  None    Significant Diagnostic Studies:    Ct Abdomen Pelvis W Iv Contrast Additional Contrast? None    Result Date: 2020  CT of the Abdomen and Pelvis with intravenous contrast medium History: Abdominal pain.  Technical Factors: CT imaging of the abdomen and pelvis were obtained and formatted as 5 mm contiguous axial images from the domes of the diaphragm to the symphysis pubis. Sagittal and coronal reconstructions were also obtained. Oral contrast medium:  None. Intravenous contrast medium:  Isovue-370, 100 mL. Comparison:  CT abdomen pelvis, November 18, 2020, November 7, 2020. Findings: Lungs:  Dependent right lower lobe subsegmental consolidation with small right effusion. Liver:  Normal in size, shape, and attenuation. Bile Ducts:  Normal in caliber. Gallbladder:  No stones or wall thickening. Pancreas:  Normal without masses, cysts, ductal dilatation or calcification. Spleen:  Normal in size without masses or calcifications. No splenules. Kidneys:  Normal in size and enhancement. Right kidney shows 3 mm calculus, medial aspect, midpole, cystlike areas measuring 7 mm posterior upper pole. No hydronephrosis. Left kidney shows no pelvocaliectasis perinephric fluid or calculi. Scarring posterior aspect upper pole lateral wall, and caudal aspect lower pole. Adrenals:  Normal. Small bowel:  Normal in caliber. Appendix:  Normal. Colon:  Copious stool in rectum. Rectal wall thickening measuring 8 mm. Minimal pericolonic fat stranding. Copious stool also identified distal half of sigmoid, transverse colon, and distal ascending colon with fluid filling proximal ascending colon and cecum. Peritoneum:  No ascites, free air, or fluid collections. Vessels: Aorta normal in course and caliber. Portal vein, splenic vein, superior mesenteric vein are patent. Lymph nodes:  Retroperitoneal:  No enlarged retroperitoneal lymph nodes. Mesenteric:  No enlarged mesenteric lymph nodes. Pelvic: No enlarged pelvic lymph nodes. Ureters: Normal in course and caliber. No calcifications. Bladder: Diffuse wall thickening, measuring up to 1.2 cm. Prostate: Enlarged with transverse diameter 5.3 cm. Multiple calculi within prostate.  Foci of low attenuation left lateral prostate measuring approximately 1.8 x 1.2 cm. Abdominal Wall:  No hernia identified. No diastasis of rectus musculature. No edema or masses. Bones:  No bone lesions. Diffuse disc space narrowing, L5-S1, and posteriorly L4-5 with anterior osteophyte basal. No post operative changes. Fluid proximal colon may reflect diarrheal state with contrast patient involving distal ascending colon through rectum. Copious fecal material in rectum, with wall thickening, and minimal fat stranding suggestive of stercoral colitis. Nonobstructing right renal calculus. Diffuse wall thickening urinary bladder. Findings may be secondary to bladder outlet obstruction from enlarged heterogenous prostate as discussed. Other etiologies including infectious and inflammatory change secondary consideration. Malignancy less likely. All CT scans at this facility use dose modulation, iterative reconstruction, and/or weight based dosing when appropriate to reduce radiation dose to as low as reasonably achievable.        Discharge Medications:       Maria G Contreras   [place] Medication Instructions U:[id number]    Printed on:11/29/20 6251   Medication Information                      acetaminophen (TYLENOL) 325 MG tablet  Take 650 mg by mouth every 4 hours as needed for Pain or Fever (pain or temp greater than 100)             baclofen (LIORESAL) 10 MG tablet  Take 10 mg by mouth every morning             Bisacodyl (DULCOLAX RE)  Place 1 Units rectally daily as needed (constipation)             busPIRone (BUSPAR) 5 MG tablet  Take 5 mg by mouth 2 times daily             calcium carbonate (TUMS EX) 750 MG chewable tablet  Take 1 tablet by mouth every 4 hours as needed for Heartburn             divalproex (DEPAKOTE ER) 250 MG extended release tablet  Take 750 mg by mouth 2 times daily             divalproex (DEPAKOTE ER) 500 MG extended release tablet  Take 500 mg by mouth nightly             ferrous sulfate (IRON symptoms.     I may not have addressed all of your medical illnesses or the abnormal blood work or imaging therefore please ask your PCP, Camilla Dickens MD ,  to obtain [facility] record to follow up on all of the abnormal labs, imaging and findings that I have and have not addressed during your hospitalization.      Discharging you from the hospital does not mean that your medical care ends here and now. You may still need additional work up, investigation, monitoring, and treatment to be handled from this point on by outside providers including your PCP, Camilla Dickens MD , Specialists and other healthcare providers.      Please review your list of discharge medications prior to resuming medications you might still have at home, as the medications you need to be taking, dosages or how often you must take them may have changed. For medication questions, contact your retail pharmacy and your PCP, Camilla Dickens MD .     ** I STRONGLY RECOMMEND that you follow up with Camilla Dickens MD within 3 to 5 days for a post hospitalization evaluation. This specific office visit is covered by your insurance, and is not the same as your annual doctor visit/ check up. This office visit is important, as it may prevent need for repeat and/or future hospitalizations. **    Your medical team at [facility] ([facility]) appreciates the opportunity to work with you to get well! Sincerely,  Deidra Chavez Follow up with Dr. Camilla Dickens MD in the next 7 days or sooner if needed. Please return to ER or call 911 if you develop any significant signs or symptoms. I may or may not have addressed all of your symptoms, medical issues,  Illnesses, or all of the abnormal blood work or imaging therefore please ask your PCP and other specialists to obtain [facility] record entirely to follow up on all of your symptoms, illnesses, abnormal labs, imaging and findings that I have and have not addressed during your hospitalization.      Discharging you from the hospital does not mean that your medical care ends here and now. You may still need to have additional work up, investigation, monitoring, surveillance, and treatment to be handled from this point on by in the out patient setting by  out patient providers including your PCP, Specialists and other healthcare providers. For medication questions, contact your retail pharmacy and your PCP. Your medical team at [facility] ([facility]) appreciates the opportunity to work with you to get well!     Марина Woodall MD

## 2020-11-29 NOTE — FLOWSHEET NOTE
1434: Message sent to pharmacy to request transit for Depakote for patient. 1456: Call to pharmacy to confirm that Depakote will be sent here.

## 2020-11-30 LAB — GI BACTERIAL PATHOGENS BY PCR: NORMAL

## 2020-11-30 PROCEDURE — 1200000002 HC SEMI PRIVATE SWING BED

## 2020-11-30 PROCEDURE — 6370000000 HC RX 637 (ALT 250 FOR IP): Performed by: INTERNAL MEDICINE

## 2020-11-30 PROCEDURE — 6360000002 HC RX W HCPCS: Performed by: INTERNAL MEDICINE

## 2020-11-30 PROCEDURE — 2580000003 HC RX 258: Performed by: INTERNAL MEDICINE

## 2020-11-30 PROCEDURE — XW033N5 INTRODUCTION OF MEROPENEM-VABORBACTAM ANTI-INFECTIVE INTO PERIPHERAL VEIN, PERCUTANEOUS APPROACH, NEW TECHNOLOGY GROUP 5: ICD-10-PCS | Performed by: INTERNAL MEDICINE

## 2020-11-30 RX ADMIN — LACTOBACILLUS TAB 3 TABLET: TAB at 20:55

## 2020-11-30 RX ADMIN — DIVALPROEX SODIUM 500 MG: 500 TABLET, EXTENDED RELEASE ORAL at 20:55

## 2020-11-30 RX ADMIN — LAMOTRIGINE 200 MG: 100 TABLET ORAL at 08:54

## 2020-11-30 RX ADMIN — BUSPIRONE HYDROCHLORIDE 5 MG: 5 TABLET ORAL at 20:56

## 2020-11-30 RX ADMIN — GABAPENTIN 100 MG: 100 CAPSULE ORAL at 08:54

## 2020-11-30 RX ADMIN — DIVALPROEX SODIUM 750 MG: 250 TABLET, FILM COATED, EXTENDED RELEASE ORAL at 08:55

## 2020-11-30 RX ADMIN — FERROUS SULFATE TAB 325 MG (65 MG ELEMENTAL FE) 325 MG: 325 (65 FE) TAB at 08:54

## 2020-11-30 RX ADMIN — BUSPIRONE HYDROCHLORIDE 5 MG: 5 TABLET ORAL at 08:56

## 2020-11-30 RX ADMIN — GABAPENTIN 100 MG: 100 CAPSULE ORAL at 20:56

## 2020-11-30 RX ADMIN — LAMOTRIGINE 200 MG: 100 TABLET ORAL at 20:56

## 2020-11-30 RX ADMIN — LACTOBACILLUS TAB 3 TABLET: TAB at 08:54

## 2020-11-30 RX ADMIN — DIVALPROEX SODIUM 750 MG: 250 TABLET, FILM COATED, EXTENDED RELEASE ORAL at 14:52

## 2020-11-30 RX ADMIN — BACLOFEN 10 MG: 10 TABLET ORAL at 08:54

## 2020-11-30 RX ADMIN — MEROPENEM 1 G: 1 INJECTION, POWDER, FOR SOLUTION INTRAVENOUS at 08:53

## 2020-11-30 RX ADMIN — LACTOBACILLUS TAB 3 TABLET: TAB at 14:52

## 2020-11-30 RX ADMIN — MEROPENEM 1 G: 1 INJECTION, POWDER, FOR SOLUTION INTRAVENOUS at 14:52

## 2020-11-30 RX ADMIN — GABAPENTIN 100 MG: 100 CAPSULE ORAL at 14:52

## 2020-11-30 ASSESSMENT — PAIN SCALES - WONG BAKER
WONGBAKER_NUMERICALRESPONSE: 0
WONGBAKER_NUMERICALRESPONSE: 0

## 2020-11-30 ASSESSMENT — PAIN SCALES - GENERAL: PAINLEVEL_OUTOF10: 0

## 2020-11-30 NOTE — PLAN OF CARE
Problem: Skin Integrity:  Goal: Will show no infection signs and symptoms  Description: Will show no infection signs and symptoms  Outcome: Ongoing  Goal: Absence of new skin breakdown  Description: Absence of new skin breakdown  Outcome: Ongoing     Problem: Falls - Risk of:  Goal: Will remain free from falls  Description: Will remain free from falls  Outcome: Ongoing  Goal: Absence of physical injury  Description: Absence of physical injury  Outcome: Ongoing     Problem: Infection:  Goal: Will remain free from infection  Description: Will remain free from infection  Outcome: Ongoing     Problem: Safety:  Goal: Free from accidental physical injury  Description: Free from accidental physical injury  Outcome: Ongoing  Goal: Free from intentional harm  Description: Free from intentional harm  Outcome: Ongoing     Problem: Daily Care:  Goal: Daily care needs are met  Description: Daily care needs are met  Outcome: Ongoing     Problem: Pain:  Goal: Patient's pain/discomfort is manageable  Description: Patient's pain/discomfort is manageable  Outcome: Ongoing     Problem: Skin Integrity:  Goal: Skin integrity will stabilize  Description: Skin integrity will stabilize  Outcome: Ongoing     Problem: Discharge Planning:  Goal: Patients continuum of care needs are met  Description: Patients continuum of care needs are met  Outcome: Ongoing

## 2020-11-30 NOTE — PROGRESS NOTES
Comprehensive Nutrition Assessment    Type and Reason for Visit:  Initial(Subacute admit 2/2 UTI for antibiotic tx.)    Nutrition Recommendations/Plan: Modify Current Diet, Start Oral Nutrition Supplement    Nutrition Assessment:  Meets criteria for moderate malnutrition in acute setting 2/2 weight loss and poor diet intake. Noted dx of colitis, which may have effected intake prior to admit. He is willing to try ONS. Start ONS BID and follow for LOS. Malnutrition Assessment:  Malnutrition Status: Moderate malnutrition    Context:  Acute Illness     Findings of the 6 clinical characteristics of malnutrition:  Energy Intake:  1 - 75% or less of estimated energy requirements for 7 or more days  Weight Loss:  7 - Greater than 7.5% over 3 months     Body Fat Loss:  Unable to assess     Muscle Mass Loss:  Unable to assess    Fluid Accumulation:  1 - Mild Extremities   Strength:  Not Performed    Estimated Daily Nutrient Needs:  Energy (kcal):  0421-4648 @ 17-19 kcal/kg; Weight Used for Energy Requirements:  Current     Protein (g):   @ 1.2-1.4 gr/kg; Weight Used for Protein Requirements:  Ideal        Fluid (ml/day):  2430 ml/d; Method Used for Fluid Requirements:  ml/Kg      Nutrition Related Findings:  +1 general edema. Last BM 11/30. Glu 104,90,65. PMH: CKD, DM II, HDL. Intellectual disability, resides at group home. Wounds:  None       Current Nutrition Therapies:    Dietary Nutrition Supplements: Diabetic Oral Supplement  DIET GENERAL; Carb Control: 5 carb choices (75 gms)/meal    Anthropometric Measures:  · Height: 6' (182.9 cm)  · Current Body Weight: 227 lb 15.3 oz (103.4 kg)(11/26/20)   · Admission Body Weight: 227 lb 15.3 oz (103.4 kg)    · Usual Body Weight: 257 lb 0.9 oz (116.6 kg)(7/30/20 chart)     · Ideal Body Weight: 178 lbs; % Ideal Body Weight 128.1 %   · BMI: 30.9  · Adjusted Body Weight:  ; No Adjustment   · BMI Categories: Obese Class 1 (BMI 30.0-34. 9)       Nutrition Diagnosis:   · Moderate malnutrition, In context of acute illness or injury related to inadequate protein-energy intake as evidenced by weight loss 7.5% in 3 months, intake 26-50%, GI abnormality      Nutrition Interventions:   Food and/or Nutrient Delivery:  Modify Current Diet, Start Oral Nutrition Supplement  Nutrition Education/Counseling:  No recommendation at this time   Coordination of Nutrition Care:  Continue to monitor while inpatient, Interdisciplinary Rounds    Goals:  Intake > 75% of meals. Glu <180.        Nutrition Monitoring and Evaluation:   Behavioral-Environmental Outcomes:  None Identified   Food/Nutrient Intake Outcomes:  Food and Nutrient Intake, Supplement Intake  Physical Signs/Symptoms Outcomes:  Biochemical Data, GI Status, Meal Time Behavior, Weight     Discharge Planning:    No discharge needs at this time     Electronically signed by Cat Leal RD, LD on 11/30/20 at 1:16 PM EST

## 2020-11-30 NOTE — PROGRESS NOTES
Pt refused vitals this am. Pt also refused to take 7am meds.    Electronically signed by Mariaa Clarke RN on 11/30/2020 at 6:44 AM

## 2020-11-30 NOTE — PLAN OF CARE
Nutrition Problem #1: Moderate malnutrition, In context of acute illness or injury  Intervention: Food and/or Nutrient Delivery: Modify Current Diet, Start Oral Nutrition Supplement  Nutritional Goals: Intake > 75% of meals. Glu <180.

## 2020-12-01 PROBLEM — A49.9 ESBL (EXTENDED SPECTRUM BETA-LACTAMASE) PRODUCING BACTERIA INFECTION: Status: ACTIVE | Noted: 2020-12-01

## 2020-12-01 PROBLEM — Z16.12 ESBL (EXTENDED SPECTRUM BETA-LACTAMASE) PRODUCING BACTERIA INFECTION: Status: ACTIVE | Noted: 2020-12-01

## 2020-12-01 LAB
ANION GAP SERPL CALCULATED.3IONS-SCNC: 9 MEQ/L (ref 9–15)
BASOPHILS ABSOLUTE: 0.1 K/UL (ref 0–0.2)
BASOPHILS RELATIVE PERCENT: 0.8 %
BLOOD CULTURE, ROUTINE: NORMAL
BUN BLDV-MCNC: 10 MG/DL (ref 6–20)
CALCIUM SERPL-MCNC: 9.6 MG/DL (ref 8.5–9.9)
CHLORIDE BLD-SCNC: 104 MEQ/L (ref 95–107)
CO2: 30 MEQ/L (ref 20–31)
CREAT SERPL-MCNC: 0.7 MG/DL (ref 0.7–1.2)
CULTURE, BLOOD 2: NORMAL
EOSINOPHILS ABSOLUTE: 0.4 K/UL (ref 0–0.7)
EOSINOPHILS RELATIVE PERCENT: 5.2 %
GFR AFRICAN AMERICAN: >60
GFR NON-AFRICAN AMERICAN: >60
GLUCOSE BLD-MCNC: 87 MG/DL (ref 70–99)
HCT VFR BLD CALC: 34.4 % (ref 42–52)
HEMOGLOBIN: 11.4 G/DL (ref 14–18)
LYMPHOCYTES ABSOLUTE: 3 K/UL (ref 1–4.8)
LYMPHOCYTES RELATIVE PERCENT: 40.3 %
MCH RBC QN AUTO: 29.8 PG (ref 27–31.3)
MCHC RBC AUTO-ENTMCNC: 33.1 % (ref 33–37)
MCV RBC AUTO: 90.2 FL (ref 80–100)
MONOCYTES ABSOLUTE: 0.6 K/UL (ref 0.2–0.8)
MONOCYTES RELATIVE PERCENT: 8.6 %
NEUTROPHILS ABSOLUTE: 3.3 K/UL (ref 1.4–6.5)
NEUTROPHILS RELATIVE PERCENT: 45.1 %
PDW BLD-RTO: 15.9 % (ref 11.5–14.5)
PLATELET # BLD: 226 K/UL (ref 130–400)
POTASSIUM SERPL-SCNC: 4 MEQ/L (ref 3.4–4.9)
RBC # BLD: 3.82 M/UL (ref 4.7–6.1)
SODIUM BLD-SCNC: 143 MEQ/L (ref 135–144)
WBC # BLD: 7.4 K/UL (ref 4.8–10.8)

## 2020-12-01 PROCEDURE — 6370000000 HC RX 637 (ALT 250 FOR IP): Performed by: INTERNAL MEDICINE

## 2020-12-01 PROCEDURE — 2580000003 HC RX 258: Performed by: INTERNAL MEDICINE

## 2020-12-01 PROCEDURE — 80048 BASIC METABOLIC PNL TOTAL CA: CPT

## 2020-12-01 PROCEDURE — 36415 COLL VENOUS BLD VENIPUNCTURE: CPT

## 2020-12-01 PROCEDURE — 6360000002 HC RX W HCPCS: Performed by: INTERNAL MEDICINE

## 2020-12-01 PROCEDURE — 85025 COMPLETE CBC W/AUTO DIFF WBC: CPT

## 2020-12-01 PROCEDURE — 1200000002 HC SEMI PRIVATE SWING BED

## 2020-12-01 RX ADMIN — BUSPIRONE HYDROCHLORIDE 5 MG: 5 TABLET ORAL at 09:18

## 2020-12-01 RX ADMIN — GABAPENTIN 100 MG: 100 CAPSULE ORAL at 22:17

## 2020-12-01 RX ADMIN — LAMOTRIGINE 200 MG: 100 TABLET ORAL at 22:16

## 2020-12-01 RX ADMIN — FERROUS SULFATE TAB 325 MG (65 MG ELEMENTAL FE) 325 MG: 325 (65 FE) TAB at 16:00

## 2020-12-01 RX ADMIN — LAMOTRIGINE 200 MG: 100 TABLET ORAL at 09:18

## 2020-12-01 RX ADMIN — DIVALPROEX SODIUM 750 MG: 250 TABLET, FILM COATED, EXTENDED RELEASE ORAL at 14:48

## 2020-12-01 RX ADMIN — GABAPENTIN 100 MG: 100 CAPSULE ORAL at 14:46

## 2020-12-01 RX ADMIN — LACTOBACILLUS TAB 3 TABLET: TAB at 22:16

## 2020-12-01 RX ADMIN — DIVALPROEX SODIUM 750 MG: 250 TABLET, FILM COATED, EXTENDED RELEASE ORAL at 09:18

## 2020-12-01 RX ADMIN — MEROPENEM 1 G: 1 INJECTION, POWDER, FOR SOLUTION INTRAVENOUS at 08:52

## 2020-12-01 RX ADMIN — FERROUS SULFATE TAB 325 MG (65 MG ELEMENTAL FE) 325 MG: 325 (65 FE) TAB at 09:18

## 2020-12-01 RX ADMIN — ENOXAPARIN SODIUM 40 MG: 40 INJECTION SUBCUTANEOUS at 09:18

## 2020-12-01 RX ADMIN — LACTOBACILLUS TAB 3 TABLET: TAB at 14:46

## 2020-12-01 RX ADMIN — MEROPENEM 1 G: 1 INJECTION, POWDER, FOR SOLUTION INTRAVENOUS at 00:30

## 2020-12-01 RX ADMIN — LACTOBACILLUS TAB 3 TABLET: TAB at 09:17

## 2020-12-01 RX ADMIN — BUSPIRONE HYDROCHLORIDE 5 MG: 5 TABLET ORAL at 22:17

## 2020-12-01 RX ADMIN — BACLOFEN 10 MG: 10 TABLET ORAL at 09:18

## 2020-12-01 RX ADMIN — MEROPENEM 1 G: 1 INJECTION, POWDER, FOR SOLUTION INTRAVENOUS at 16:00

## 2020-12-01 RX ADMIN — DIVALPROEX SODIUM 500 MG: 500 TABLET, EXTENDED RELEASE ORAL at 22:17

## 2020-12-01 RX ADMIN — GABAPENTIN 100 MG: 100 CAPSULE ORAL at 09:18

## 2020-12-01 ASSESSMENT — PAIN SCALES - WONG BAKER
WONGBAKER_NUMERICALRESPONSE: 0

## 2020-12-01 ASSESSMENT — PAIN - FUNCTIONAL ASSESSMENT: PAIN_FUNCTIONAL_ASSESSMENT: ACTIVITIES ARE NOT PREVENTED

## 2020-12-01 ASSESSMENT — PAIN DESCRIPTION - PROGRESSION: CLINICAL_PROGRESSION: NOT CHANGED

## 2020-12-01 NOTE — FLOWSHEET NOTE
7583: Patient is asleep in bed and is refusing at this time to take his am medication or to eat breakfast. Per PM nursing report patient was awake all night long. This RN was able to get vitals this am on patient and was able to infuse IV antibiotic therapy. Will continue to round on patient to try to give am medications and encourage PO intake. Call light remains in reach. 8932: Patient took all AM medications. Patient ate 90% of his breakfast. Patient is listening to music on his personal CD players. Call light remains in reach. Electronically signed by Myesha Bello RN on 12/1/2020 at 9:45 AM    80: This RN offered to feed patient his lunch. Patient refused his lunch at this time and stated that he is not hungry and would rather sleep. Will attempt to feed patient later.  Electronically signed by Myesah Bello RN on 12/1/2020 at 12:16 PM

## 2020-12-02 PROCEDURE — 2580000003 HC RX 258: Performed by: INTERNAL MEDICINE

## 2020-12-02 PROCEDURE — 6370000000 HC RX 637 (ALT 250 FOR IP): Performed by: INTERNAL MEDICINE

## 2020-12-02 PROCEDURE — 1200000002 HC SEMI PRIVATE SWING BED

## 2020-12-02 PROCEDURE — 6360000002 HC RX W HCPCS: Performed by: INTERNAL MEDICINE

## 2020-12-02 PROCEDURE — 2500000003 HC RX 250 WO HCPCS: Performed by: INTERNAL MEDICINE

## 2020-12-02 RX ADMIN — MEROPENEM 1 G: 1 INJECTION, POWDER, FOR SOLUTION INTRAVENOUS at 08:38

## 2020-12-02 RX ADMIN — LAMOTRIGINE 200 MG: 100 TABLET ORAL at 08:38

## 2020-12-02 RX ADMIN — LACTOBACILLUS TAB 3 TABLET: TAB at 21:17

## 2020-12-02 RX ADMIN — DIVALPROEX SODIUM 750 MG: 250 TABLET, FILM COATED, EXTENDED RELEASE ORAL at 08:38

## 2020-12-02 RX ADMIN — BACLOFEN 10 MG: 10 TABLET ORAL at 08:39

## 2020-12-02 RX ADMIN — LACTOBACILLUS TAB 3 TABLET: TAB at 14:05

## 2020-12-02 RX ADMIN — LEVOTHYROXINE SODIUM 50 MCG: 25 TABLET ORAL at 06:40

## 2020-12-02 RX ADMIN — BUSPIRONE HYDROCHLORIDE 5 MG: 5 TABLET ORAL at 21:18

## 2020-12-02 RX ADMIN — GABAPENTIN 100 MG: 100 CAPSULE ORAL at 14:05

## 2020-12-02 RX ADMIN — LAMOTRIGINE 200 MG: 100 TABLET ORAL at 21:18

## 2020-12-02 RX ADMIN — DIVALPROEX SODIUM 750 MG: 250 TABLET, FILM COATED, EXTENDED RELEASE ORAL at 14:05

## 2020-12-02 RX ADMIN — GABAPENTIN 100 MG: 100 CAPSULE ORAL at 21:18

## 2020-12-02 RX ADMIN — GABAPENTIN 100 MG: 100 CAPSULE ORAL at 08:38

## 2020-12-02 RX ADMIN — DIVALPROEX SODIUM 500 MG: 500 TABLET, EXTENDED RELEASE ORAL at 21:18

## 2020-12-02 RX ADMIN — BUSPIRONE HYDROCHLORIDE 5 MG: 5 TABLET ORAL at 08:39

## 2020-12-02 RX ADMIN — MEROPENEM 1 G: 1 INJECTION, POWDER, FOR SOLUTION INTRAVENOUS at 00:01

## 2020-12-02 RX ADMIN — MEROPENEM 1 G: 1 INJECTION, POWDER, FOR SOLUTION INTRAVENOUS at 17:52

## 2020-12-02 RX ADMIN — LACTOBACILLUS TAB 3 TABLET: TAB at 08:38

## 2020-12-02 RX ADMIN — FERROUS SULFATE TAB 325 MG (65 MG ELEMENTAL FE) 325 MG: 325 (65 FE) TAB at 08:39

## 2020-12-02 RX ADMIN — MICONAZOLE NITRATE: 20 POWDER TOPICAL at 21:18

## 2020-12-02 RX ADMIN — FERROUS SULFATE TAB 325 MG (65 MG ELEMENTAL FE) 325 MG: 325 (65 FE) TAB at 17:53

## 2020-12-02 ASSESSMENT — PAIN SCALES - WONG BAKER
WONGBAKER_NUMERICALRESPONSE: 0
WONGBAKER_NUMERICALRESPONSE: 0

## 2020-12-02 ASSESSMENT — PAIN DESCRIPTION - PROGRESSION
CLINICAL_PROGRESSION: NOT CHANGED
CLINICAL_PROGRESSION: NOT CHANGED

## 2020-12-02 ASSESSMENT — PAIN - FUNCTIONAL ASSESSMENT
PAIN_FUNCTIONAL_ASSESSMENT: ACTIVITIES ARE NOT PREVENTED
PAIN_FUNCTIONAL_ASSESSMENT: ACTIVITIES ARE NOT PREVENTED

## 2020-12-02 NOTE — PROGRESS NOTES
This  met with patient with two RN's in patient's room after hearing patient yelling and using profanities from the hallway. Patient is observed laying in hospital bed shaking, speach is loud and pressured. Patient repeats \" I am a little upset right now. \"  Patient reports, \" I am supposed to be going home on Thursday. \"  Patient's mother is on speaker phone attempting to call patient down. This  provided support through presence, reassurance, validation, and attempting to redirect patient's focus off of discharge. Patient was eventually able to calm down and be redirected. [facility] RN House supervisor advised of above.

## 2020-12-02 NOTE — DISCHARGE INSTR - COC
Continuity of Care Form    Patient Name: Pantera Roberts   :  1970  MRN:  [id number]    Admit date:  2020  Discharge date:  ***    Code Status Order: Prior   Advance Directives:   [facility] Documentation       Date/Time Healthcare Directive Type of Healthcare Directive Copy in [address] Agent's Name Healthcare Agent's Phone Number    [phone]  Unknown, patient unable to respond due to medical condition  Durable power of  for health care -- -- -- --            Admitting Physician:  Rudy Jordan MD  PCP: Marie Turpin MD    Discharging Nurse: [facility] Unit/Room#: 0202/0202-01  Discharging Unit Phone Number: ***    Emergency Contact:   Extended Emergency Contact Information  Primary Emergency Contact: Zohaib Ferrera  Address: [address] [place] [address],[address], [address] Phone: [phone]  Relation: Parent    Past Surgical History:  Past Surgical History:   Procedure Laterality Date    LITHOTRIPSY  2020       Immunization History: There is no immunization history on file for this patient. Active Problems:  Patient Active Problem List   Diagnosis Code    Sepsis ([address] A41.9    Small bowel obstruction ([address] K56.609    Epileptic seizure ([address] G40.909    Hypothyroidism E03.9    HTN (hypertension) I10    Moderate intellectual disabilities F71    Chronic pain G89.29    UTI (urinary tract infection) N39.0    ESBL (extended spectrum beta-lactamase) producing bacteria infection A49.9, Z16.12       Isolation/Infection:   Isolation            Contact          Patient Infection Status       Infection Onset Added Last Indicated Last Indicated By Review Planned Expiration Resolved Resolved By    ESBL (Extended Spectrum Beta Lactamase) 20 Culture, Urine        Klebsiella pneumoniae + ESBL of urine 20. Electronically signed by Neri García RN on 2020 at 10:10 AM     Klebsiella pneumoniae ESBL of blood and urine 20. Electronically signed by Leonides Loja RN on 20 at 6:28 PM EDT       MDRO (multi-drug resistant organism) 20 Culture, Urine        Klebsiella pneumoniae ESBL of blood and urine 20. Electronically signed by Leonides Loja RN on 20 at 6:28 PM EDT      Resolved    C-diff Rule Out 20 Gastrointestinal Panel, Molecular (Ordered)   20 Rule-Out Test Resulted    C-diff Rule Out 20 Clostridium Difficile Toxin/Antigen (Ordered)   20 Rule-Out Test Resulted    COVID-19 Rule Out 20 COVID-19 (Ordered)   20 Rule-Out Test Resulted    COVID-19 Rule Out 20 COVID-19 (Ordered)   20     COVID-19 Rule Out 20 COVID-19 (Ordered)   20 Rule-Out Test Resulted    COVID-19 Rule Out 20 COVID-19 (Ordered)   20 Rule-Out Test Resulted            Nurse Assessment:  Last Vital Signs: /76   Pulse 71   Temp 97.8 °F (36.6 °C) (Axillary)   Resp 18   Ht 6' (1.829 m)   Wt 228 lb 1.6 oz (103.5 kg)   SpO2 97%   BMI 30.94 kg/m²     Last documented pain score (0-10 scale): Pain Level: 0  Last Weight:   Wt Readings from Last 1 Encounters:   20 228 lb 1.6 oz (103.5 kg)     Mental Status:  disoriented and alert    IV Access:  - None    Nursing Mobility/ADLs:  Walking   Dependent  Transfer  Dependent  Bathing  Dependent  Dressing  Dependent  Toileting  Dependent  Feeding  Assisted  Med Admin  Assisted  Med Delivery   whole    Wound Care Documentation and Therapy:  Wound 20 Buttocks syage two  noted on both buttocks (Active)   Number of days: 122       Wound 20 Buttocks Left stage 2  meplex off due to incontinence (Active)   Number of days: 122        Elimination:  Continence:   · Bowel: No  · Bladder: No  Urinary Catheter: None retention. Advise against chronic Jameson catheter due to his history of multidrug-resistant UTI. Physician Certification: I certify the above information and transfer of Tere Lobo  is necessary for the continuing treatment of the diagnosis listed and that he requires Home Care for greater 30 days.      Update Admission H&P: No change in H&P    PHYSICIAN SIGNATURE:  {Esignature:[id number]}

## 2020-12-02 NOTE — PROGRESS NOTES
Patient is doing well. No chest pain or palpitation. No abdominal pain or vomiting. HEENT: Head: Normocephalic, no lesions, without obvious abnormality. Neck: no adenopathy, no carotid bruit, no JVD, supple, symmetrical, trachea midline and thyroid not enlarged, symmetric, no tenderness/mass/nodules  Lungs: clear to auscultation bilaterally  Heart: regular rate and rhythm, S1, S2 normal, no murmur, click, rub or gallop  Abdomen: soft, non-tender; bowel sounds normal; no masses,  no organomegaly, no guarding, no rebound. Extremities: extremities normal, atraumatic, no cyanosis or edema  Neurologic: Mental status: Alert, disoriented to exact date and location. .  Patient is calm more today. [place] is able to answer simple questions. [place] is unable to elaborate or engage in any complex conversation.  Functional and cognitive impairment, moderate degree.      * ESBL UTI.     *Seizure disorder.     *Intellectual disability.     *Hypothyroidism.     *History of hypertension.     *DVT prophylaxis. Case was discussed with his mom at the bedside. Continue course of antibiotic until it is completed.

## 2020-12-02 NOTE — PROGRESS NOTES
Chart reviewed. Patient was admitted to [facility] [place] unit for IV antibiotics needs. Patient is reported to be a resident of [address] [place] [address] in [place]. Patient's mother is legal guardian and primary decision maker due to patient's impaired cognition. This  met with patient and then spoke with patient's mother via phone to complete psychosocial assessment. Upon visit patient is alert and sitting up in hospital bed watching DVD on personal devise. Patient appears well groomed with short kept hair and shaved face. Patient greets this  with a smile. Mood is happy demonstrated by laughing and joking with this  about DVD patient is watching. Patient reports no current pain or discomfort. Patient reports that he plans to return to the group home \"on Thursday. \"  Patient reports that he likes the group home. Patient reports no issues at this time. This  spoke with patient's mother via phone whom provided patient's background information. Patient's mother reports that patient was residing in an apartment in [place] with /7 care for a year before moving to [facility] [address] [place] [address] in [place]. Patient's mother reports that patient has only been at the group home for \"6 weeks\" and seems to be happy there. Mother reports that plan is for patient to return to [facility] [address] [place] [address] upon completing IV antibiotics.   SS to continue to follow

## 2020-12-03 VITALS
HEART RATE: 66 BPM | WEIGHT: 228.1 LBS | TEMPERATURE: 97.9 F | HEIGHT: 72 IN | SYSTOLIC BLOOD PRESSURE: 146 MMHG | BODY MASS INDEX: 30.89 KG/M2 | RESPIRATION RATE: 18 BRPM | DIASTOLIC BLOOD PRESSURE: 73 MMHG | OXYGEN SATURATION: 98 %

## 2020-12-03 PROCEDURE — 6360000002 HC RX W HCPCS: Performed by: INTERNAL MEDICINE

## 2020-12-03 PROCEDURE — 51798 US URINE CAPACITY MEASURE: CPT

## 2020-12-03 PROCEDURE — 6370000000 HC RX 637 (ALT 250 FOR IP): Performed by: INTERNAL MEDICINE

## 2020-12-03 PROCEDURE — 51701 INSERT BLADDER CATHETER: CPT

## 2020-12-03 PROCEDURE — 2580000003 HC RX 258: Performed by: INTERNAL MEDICINE

## 2020-12-03 RX ORDER — MAGNESIUM CARB/ALUMINUM HYDROX 105-160MG
150 TABLET,CHEWABLE ORAL 2 TIMES DAILY PRN
Qty: 2 BOTTLE | Refills: 0 | Status: SHIPPED | OUTPATIENT
Start: 2020-12-03 | End: 2020-12-03 | Stop reason: HOSPADM

## 2020-12-03 RX ORDER — AMLODIPINE BESYLATE 2.5 MG/1
2.5 TABLET ORAL DAILY
Qty: 30 TABLET | Refills: 1 | Status: SHIPPED | OUTPATIENT
Start: 2020-12-03 | End: 2021-01-04

## 2020-12-03 RX ADMIN — BACLOFEN 10 MG: 10 TABLET ORAL at 09:08

## 2020-12-03 RX ADMIN — MICONAZOLE NITRATE: 20 POWDER TOPICAL at 09:09

## 2020-12-03 RX ADMIN — BUSPIRONE HYDROCHLORIDE 5 MG: 5 TABLET ORAL at 09:08

## 2020-12-03 RX ADMIN — DIVALPROEX SODIUM 750 MG: 250 TABLET, FILM COATED, EXTENDED RELEASE ORAL at 09:08

## 2020-12-03 RX ADMIN — LACTOBACILLUS TAB 3 TABLET: TAB at 09:08

## 2020-12-03 RX ADMIN — FERROUS SULFATE TAB 325 MG (65 MG ELEMENTAL FE) 325 MG: 325 (65 FE) TAB at 09:08

## 2020-12-03 RX ADMIN — MEROPENEM 1 G: 1 INJECTION, POWDER, FOR SOLUTION INTRAVENOUS at 00:36

## 2020-12-03 RX ADMIN — LAMOTRIGINE 200 MG: 100 TABLET ORAL at 09:08

## 2020-12-03 RX ADMIN — MEROPENEM 1 G: 1 INJECTION, POWDER, FOR SOLUTION INTRAVENOUS at 09:09

## 2020-12-03 RX ADMIN — GABAPENTIN 100 MG: 100 CAPSULE ORAL at 09:08

## 2020-12-03 RX ADMIN — GABAPENTIN 100 MG: 100 CAPSULE ORAL at 14:13

## 2020-12-03 RX ADMIN — ENOXAPARIN SODIUM 40 MG: 40 INJECTION SUBCUTANEOUS at 09:08

## 2020-12-03 RX ADMIN — LACTOBACILLUS TAB 3 TABLET: TAB at 14:13

## 2020-12-03 ASSESSMENT — PAIN - FUNCTIONAL ASSESSMENT
PAIN_FUNCTIONAL_ASSESSMENT: PREVENTS OR INTERFERES SOME ACTIVE ACTIVITIES AND ADLS
PAIN_FUNCTIONAL_ASSESSMENT: PREVENTS OR INTERFERES SOME ACTIVE ACTIVITIES AND ADLS

## 2020-12-03 ASSESSMENT — PAIN SCALES - WONG BAKER
WONGBAKER_NUMERICALRESPONSE: 0
WONGBAKER_NUMERICALRESPONSE: 0

## 2020-12-03 ASSESSMENT — PAIN DESCRIPTION - PROGRESSION
CLINICAL_PROGRESSION: NOT CHANGED
CLINICAL_PROGRESSION: NOT CHANGED

## 2020-12-03 NOTE — DISCHARGE SUMMARY
acetaminophen (TYLENOL) 325 MG tablet  Take 650 mg by mouth every 4 hours as needed for Pain or Fever (pain or temp greater than 100)             amLODIPine (NORVASC) 2.5 MG tablet  Take 1 tablet by mouth daily             baclofen (LIORESAL) 10 MG tablet  Take 10 mg by mouth every morning             Bisacodyl (DULCOLAX RE)  Place 1 Units rectally daily as needed (constipation)             busPIRone (BUSPAR) 5 MG tablet  Take 5 mg by mouth 2 times daily             calcium carbonate (TUMS EX) 750 MG chewable tablet  Take 1 tablet by mouth every 4 hours as needed for Heartburn             divalproex (DEPAKOTE ER) 250 MG extended release tablet  Take 750 mg by mouth 2 times daily             divalproex (DEPAKOTE ER) 500 MG extended release tablet  Take 500 mg by mouth nightly             ferrous sulfate (IRON 325) 325 (65 Fe) MG tablet  Take 1 tablet by mouth 2 times daily (with meals)             gabapentin (NEURONTIN) 100 MG capsule  Take 1 capsule by mouth 3 times daily for 30 days. guaiFENesin (MUCINEX) 600 MG extended release tablet  Take 600 mg by mouth 2 times daily as needed for Congestion For chest congestion             lamoTRIgine (LAMICTAL) 200 MG tablet  Take 200 mg by mouth 2 times daily             levothyroxine (SYNTHROID) 50 MCG tablet  Take 50 mcg by mouth Daily             miconazole (MICOTIN) 2 % powder  Apply topically 2 times daily as need for fungal rash             polyethylene glycol (GLYCOLAX) 17 g packet  Take 17 g by mouth daily as needed for Constipation                 Disposition:   Discharged to [place] [address] at group home  Home. Any [facility] needs that were indicated and/or required as been addressed and set up by Social Work. Condition at discharge: Pt was medically stable at the time of discharge. Significant improvement in clinical condition compared to initial condition at presentation to hospital    Activity: activity as tolerated, fall precautions.      Total time taken for discharging this patient: 40 minutes. Greater than 70% of time was spent focused exclusively on this patient. Time was taken to review chart, discuss plans with consultants, reconciling medications, discussing plan answering questions with patient. Javier Tai  12/3/2020, 10:31 AM  ----------------------------------------------------------------------------------------------------------------------    Zoila Otoole,     Please return to ER or call 911 if you develop any significant signs or symptoms.     I may not have addressed all of your medical illnesses or the abnormal blood work or imaging therefore please ask your PCP, Jose Valladares MD ,  to obtain [facility] record to follow up on all of the abnormal labs, imaging and findings that I have and have not addressed during your hospitalization.      Discharging you from the hospital does not mean that your medical care ends here and now. You may still need additional work up, investigation, monitoring, and treatment to be handled from this point on by outside providers including your PCP, Jose Valladares MD , Specialists and other healthcare providers.      Please review your list of discharge medications prior to resuming medications you might still have at home, as the medications you need to be taking, dosages or how often you must take them may have changed. For medication questions, contact your retail pharmacy and your PCP, Jose Valladares MD .     ** I STRONGLY RECOMMEND that you follow up with Jose Valladares MD within 3 to 5 days for a post hospitalization evaluation. This specific office visit is covered by your insurance, and is not the same as your annual doctor visit/ check up. This office visit is important, as it may prevent need for repeat and/or future hospitalizations. **    Your medical team at [facility]) appreciates the opportunity to work with you to get well!     Sincerely,  Rafik Massouh Follow up with Dr. Jose Valladares MD in the next 7 days or sooner if needed. Please return to ER or call 911 if you develop any significant signs or symptoms. I may or may not have addressed all of your symptoms, medical issues,  Illnesses, or all of the abnormal blood work or imaging therefore please ask your PCP and other specialists to obtain [id number] [facility] record entirely to follow up on all of your symptoms, illnesses, abnormal labs, imaging and findings that I have and have not addressed during your hospitalization. Discharging you from the hospital does not mean that your medical care ends here and now. You may still need to have additional work up, investigation, monitoring, surveillance, and treatment to be handled from this point on by in the out patient setting by  out patient providers including your PCP, Specialists and other healthcare providers. For medication questions, contact your retail pharmacy and your PCP. Your medical team at [facility]) appreciates the opportunity to work with you to get well!     Shey Galeana MD

## 2020-12-03 NOTE — PROGRESS NOTES
Pt is A&O x 3 with short term memory loss noted. Mild agitation with care at times but easily redirected.

## 2020-12-04 ENCOUNTER — VIRTUAL VISIT (OUTPATIENT)
Dept: GERIATRIC MEDICINE | Age: 50
End: 2020-12-04
Payer: MEDICARE

## 2020-12-04 DIAGNOSIS — Z46.6: Primary | ICD-10-CM

## 2020-12-04 DIAGNOSIS — R32 URINARY INCONTINENCE, UNSPECIFIED TYPE: ICD-10-CM

## 2020-12-04 PROCEDURE — 99442 PR PHYS/QHP TELEPHONE EVALUATION 11-20 MIN: CPT | Performed by: PHYSICIAN ASSISTANT

## 2020-12-17 ENCOUNTER — OFFICE VISIT (OUTPATIENT)
Dept: GERIATRIC MEDICINE | Age: 50
End: 2020-12-17
Payer: MEDICARE

## 2020-12-17 DIAGNOSIS — R39.81 FUNCTIONAL URINARY INCONTINENCE: ICD-10-CM

## 2020-12-17 DIAGNOSIS — I10 ESSENTIAL HYPERTENSION: Primary | ICD-10-CM

## 2020-12-17 DIAGNOSIS — R33.9 URINARY RETENTION WITH INCOMPLETE BLADDER EMPTYING: ICD-10-CM

## 2020-12-17 DIAGNOSIS — K21.9 GASTROESOPHAGEAL REFLUX DISEASE WITHOUT ESOPHAGITIS: ICD-10-CM

## 2020-12-17 DIAGNOSIS — N39.0 RECURRENT UTI: ICD-10-CM

## 2020-12-17 DIAGNOSIS — Z97.8 FOLEY CATHETER IN PLACE: ICD-10-CM

## 2020-12-17 PROCEDURE — G8484 FLU IMMUNIZE NO ADMIN: HCPCS | Performed by: PHYSICIAN ASSISTANT

## 2020-12-17 PROCEDURE — 99309 SBSQ NF CARE MODERATE MDM 30: CPT | Performed by: PHYSICIAN ASSISTANT

## 2020-12-26 ENCOUNTER — APPOINTMENT (OUTPATIENT)
Dept: ULTRASOUND IMAGING | Age: 50
End: 2020-12-26
Payer: MEDICARE

## 2020-12-26 ENCOUNTER — HOSPITAL ENCOUNTER (EMERGENCY)
Age: 50
Discharge: HOME | End: 2020-12-26
Attending: EMERGENCY MEDICINE
Payer: MEDICARE

## 2020-12-26 VITALS
TEMPERATURE: 99.1 F | OXYGEN SATURATION: 98 % | HEART RATE: 78 BPM | DIASTOLIC BLOOD PRESSURE: 78 MMHG | BODY MASS INDEX: 29.8 KG/M2 | SYSTOLIC BLOOD PRESSURE: 127 MMHG | WEIGHT: 220 LBS | HEIGHT: 72 IN | RESPIRATION RATE: 16 BRPM

## 2020-12-26 PROBLEM — N39.0 UTI (URINARY TRACT INFECTION): Status: RESOLVED | Noted: 2020-11-26 | Resolved: 2020-12-26

## 2020-12-26 LAB
ANION GAP SERPL CALCULATED.3IONS-SCNC: 10 MEQ/L (ref 9–15)
BACTERIA: ABNORMAL /HPF
BASOPHILS ABSOLUTE: 0 K/UL (ref 0–0.2)
BASOPHILS RELATIVE PERCENT: 0.2 %
BILIRUBIN URINE: NEGATIVE
BLOOD, URINE: ABNORMAL
BUN BLDV-MCNC: 23 MG/DL (ref 6–20)
CALCIUM SERPL-MCNC: 9.7 MG/DL (ref 8.5–9.9)
CHLORIDE BLD-SCNC: 101 MEQ/L (ref 95–107)
CLARITY: ABNORMAL
CO2: 27 MEQ/L (ref 20–31)
COLOR: YELLOW
CREAT SERPL-MCNC: 0.98 MG/DL (ref 0.7–1.2)
EOSINOPHILS ABSOLUTE: 0.2 K/UL (ref 0–0.7)
EOSINOPHILS RELATIVE PERCENT: 2.5 %
EPITHELIAL CELLS, UA: ABNORMAL /HPF
GFR AFRICAN AMERICAN: >60
GFR NON-AFRICAN AMERICAN: >60
GLUCOSE BLD-MCNC: 102 MG/DL (ref 70–99)
GLUCOSE URINE: NEGATIVE MG/DL
HCT VFR BLD CALC: 37.1 % (ref 42–52)
HEMOGLOBIN: 11.9 G/DL (ref 14–18)
KETONES, URINE: ABNORMAL MG/DL
LEUKOCYTE ESTERASE, URINE: ABNORMAL
LYMPHOCYTES ABSOLUTE: 1.6 K/UL (ref 1–4.8)
LYMPHOCYTES RELATIVE PERCENT: 17 %
MCH RBC QN AUTO: 29.4 PG (ref 27–31.3)
MCHC RBC AUTO-ENTMCNC: 32.1 % (ref 33–37)
MCV RBC AUTO: 91.5 FL (ref 80–100)
MONOCYTES ABSOLUTE: 1.4 K/UL (ref 0.2–0.8)
MONOCYTES RELATIVE PERCENT: 14.6 %
NEUTROPHILS ABSOLUTE: 6.1 K/UL (ref 1.4–6.5)
NEUTROPHILS RELATIVE PERCENT: 65.7 %
NITRITE, URINE: POSITIVE
PDW BLD-RTO: 14.6 % (ref 11.5–14.5)
PH UA: 6.5 (ref 5–9)
PLATELET # BLD: 257 K/UL (ref 130–400)
POTASSIUM SERPL-SCNC: 4.1 MEQ/L (ref 3.4–4.9)
PROTEIN UA: 100 MG/DL
RBC # BLD: 4.05 M/UL (ref 4.7–6.1)
RBC UA: ABNORMAL /HPF (ref 0–2)
SODIUM BLD-SCNC: 138 MEQ/L (ref 135–144)
SPECIFIC GRAVITY UA: 1.02 (ref 1–1.03)
URINE REFLEX TO CULTURE: YES
UROBILINOGEN, URINE: 0.2 E.U./DL
WBC # BLD: 9.3 K/UL (ref 4.8–10.8)
WBC UA: ABNORMAL /HPF (ref 0–5)

## 2020-12-26 PROCEDURE — 36415 COLL VENOUS BLD VENIPUNCTURE: CPT

## 2020-12-26 PROCEDURE — 87077 CULTURE AEROBIC IDENTIFY: CPT

## 2020-12-26 PROCEDURE — 85025 COMPLETE CBC W/AUTO DIFF WBC: CPT

## 2020-12-26 PROCEDURE — 87086 URINE CULTURE/COLONY COUNT: CPT

## 2020-12-26 PROCEDURE — 93971 EXTREMITY STUDY: CPT

## 2020-12-26 PROCEDURE — 99284 EMERGENCY DEPT VISIT MOD MDM: CPT

## 2020-12-26 PROCEDURE — 2580000003 HC RX 258: Performed by: EMERGENCY MEDICINE

## 2020-12-26 PROCEDURE — 81001 URINALYSIS AUTO W/SCOPE: CPT

## 2020-12-26 PROCEDURE — 80048 BASIC METABOLIC PNL TOTAL CA: CPT

## 2020-12-26 PROCEDURE — 87186 SC STD MICRODIL/AGAR DIL: CPT

## 2020-12-26 RX ORDER — ONDANSETRON 4 MG/1
4 TABLET, FILM COATED ORAL EVERY 8 HOURS PRN
Status: ON HOLD | COMMUNITY

## 2020-12-26 RX ORDER — SULFAMETHOXAZOLE AND TRIMETHOPRIM 800; 160 MG/1; MG/1
1 TABLET ORAL 2 TIMES DAILY
Qty: 14 TABLET | Refills: 0 | Status: SHIPPED | OUTPATIENT
Start: 2020-12-26 | End: 2021-01-02

## 2020-12-26 RX ORDER — SODIUM CHLORIDE 0.9 % (FLUSH) 0.9 %
3 SYRINGE (ML) INJECTION EVERY 8 HOURS
Status: DISCONTINUED | OUTPATIENT
Start: 2020-12-26 | End: 2020-12-26 | Stop reason: HOSPADM

## 2020-12-26 RX ORDER — PROPRANOLOL HCL 60 MG
60 CAPSULE, EXTENDED RELEASE 24HR ORAL DAILY
Status: ON HOLD | COMMUNITY
End: 2021-01-22 | Stop reason: ALTCHOICE

## 2020-12-26 RX ORDER — DIAZEPAM 10 MG/2ML
10 GEL RECTAL
Status: ON HOLD | COMMUNITY

## 2020-12-26 RX ORDER — LORATADINE 10 MG/1
10 TABLET ORAL DAILY PRN
Status: ON HOLD | COMMUNITY

## 2020-12-26 RX ADMIN — Medication 3 ML: at 09:32

## 2020-12-26 ASSESSMENT — ENCOUNTER SYMPTOMS
COUGH: 0
NAUSEA: 0
SORE THROAT: 0
ABDOMINAL PAIN: 0
BACK PAIN: 1
EYE REDNESS: 0
EYE DISCHARGE: 0
SHORTNESS OF BREATH: 0
VOMITING: 0

## 2020-12-26 ASSESSMENT — PAIN DESCRIPTION - ONSET: ONSET: SUDDEN

## 2020-12-26 ASSESSMENT — PAIN DESCRIPTION - PAIN TYPE: TYPE: ACUTE PAIN

## 2020-12-26 ASSESSMENT — PAIN DESCRIPTION - FREQUENCY: FREQUENCY: CONTINUOUS

## 2020-12-26 ASSESSMENT — PAIN DESCRIPTION - LOCATION: LOCATION: LEG

## 2020-12-26 ASSESSMENT — PAIN DESCRIPTION - PROGRESSION: CLINICAL_PROGRESSION: GRADUALLY WORSENING

## 2020-12-26 ASSESSMENT — PAIN DESCRIPTION - ORIENTATION: ORIENTATION: LEFT

## 2020-12-26 ASSESSMENT — PAIN SCALES - GENERAL
PAINLEVEL_OUTOF10: 7
PAINLEVEL_OUTOF10: 7

## 2020-12-26 NOTE — ED NOTES
Abhinav called r/t Pt status with d/c back to facility. Spoke with Pb Moore, asked if ATB could be phoned in to [facility] for them to p/u later. Pt aware of plan of care.      Julee Robledo RN  12/26/20 7631

## 2020-12-26 NOTE — ED TRIAGE NOTES
Pt from [address] [place] [address] via EMS for left leg pain and discoloration, Pt has Hx of DVT. Pt states his leg has been hurting and has back pain. Pain rated 7/10. Pt does not move lower extremities or walk, Pt is w/c bound at [address] [place] [address]. Pt has discoloration to lower left leg and states its been hurting for about 10 days. Pt plan of care explained to Pt at bedside during exam by Dr. Nathan Salgado.

## 2020-12-26 NOTE — ED PROVIDER NOTES
[facility] ED  EMERGENCY DEPARTMENT ENCOUNTER      Pt Name: Natalie Parks  MRN: [id number]  [place] 1970  Date of evaluation: 12/26/2020  Provider: Chely Whitehead DO        HISTORY OF PRESENT ILLNESS    Natalie Parks is a 44 y.o. male per chart review has ah/o DVT and back pain. He presents from a group home by EMS. His left leg has increased swelling and pain. He has to self cath and has frequent UTI's. Patient has MRDD so history and exam is limited. The history is provided by the patient. Leg Injury  Location:  Leg  Leg location:  L leg  Pain details:     Quality:  Aching and pressure    Radiates to:  L leg    Severity:  Moderate    Onset quality:  Gradual    Duration:  1 day    Timing:  Constant    Progression:  Worsening  Chronicity:  New  Dislocation: no    Foreign body present:  No foreign bodies  Prior injury to area:  Unable to specify  Relieved by:  Nothing  Worsened by: Activity  Ineffective treatments:  NSAIDs  Associated symptoms: back pain and swelling    Associated symptoms: no fever and no neck pain             REVIEW OF SYSTEMS       Review of Systems   Unable to perform ROS: Psychiatric disorder   Constitutional: Negative for chills and fever. HENT: Negative for ear pain and sore throat. Eyes: Negative for discharge and redness. Respiratory: Negative for cough and shortness of breath. Cardiovascular: Positive for leg swelling. Negative for chest pain and palpitations. Gastrointestinal: Negative for abdominal pain, nausea and vomiting. Genitourinary: Negative for difficulty urinating and dysuria. Musculoskeletal: Positive for back pain. Negative for neck pain. Skin: Negative for rash and wound. Neurological: Negative for dizziness and syncope. Psychiatric/Behavioral: Negative for confusion. The patient is not nervous/anxious. All other systems reviewed and are negative.       Except as noted above the remainder of the review of systems was 2 % POWDER    Apply topically 2 times daily as need for fungal rash    ONDANSETRON (ZOFRAN) 4 MG TABLET    Take 4 mg by mouth every 8 hours as needed for Nausea or Vomiting    POLYETHYLENE GLYCOL (GLYCOLAX) 17 G PACKET    Take 17 g by mouth daily as needed for Constipation    PROPRANOLOL (INDERAL LA) 60 MG EXTENDED RELEASE CAPSULE    Take 60 mg by mouth daily       ALLERGIES     Cefazolin, Celontin [methsuximide], Duricef [cefadroxil], Simvastatin, Tramadol, and Vicodin [hydrocodone-acetaminophen]    FAMILY HISTORY     History reviewed. No pertinent family history.        SOCIAL HISTORY       Social History     Socioeconomic History    Marital status: Single     Spouse name: None    Number of children: None    Years of education: None    Highest education level: None   Occupational History    None   Social Needs    Financial resource strain: None    Food insecurity     Worry: None     Inability: None    Transportation needs     Medical: None     Non-medical: None   Tobacco Use    Smoking status: Never Smoker    Smokeless tobacco: Never Used   Substance and Sexual Activity    Alcohol use: Never     Frequency: Never    Drug use: Never    Sexual activity: Not Currently   Lifestyle    Physical activity     Days per week: None     Minutes per session: None    Stress: None   Relationships    Social connections     Talks on phone: None     Gets together: None     Attends Zoroastrian service: None     Active member of club or organization: None     Attends meetings of clubs or organizations: None     Relationship status: None    Intimate partner violence     Fear of current or ex partner: None     Emotionally abused: None     Physically abused: None     Forced sexual activity: None   Other Topics Concern    None   Social History Narrative    None         PHYSICAL EXAM       ED Triage Vitals   BP Temp Temp src Pulse Resp SpO2 Height Weight   -- -- -- -- -- -- -- --       Physical Exam  Vitals signs and nursing note reviewed. Constitutional:       Appearance: Normal appearance. HENT:      Head: Normocephalic and atraumatic. Right Ear: Tympanic membrane normal.      Left Ear: Tympanic membrane normal.      Nose: Nose normal.      Mouth/Throat:      Mouth: Mucous membranes are moist.      Pharynx: Oropharynx is clear. Eyes:      General: Lids are normal.      Extraocular Movements: Extraocular movements intact. Conjunctiva/sclera: Conjunctivae normal.      Pupils: Pupils are equal, round, and reactive to light. Neck:      Musculoskeletal: Full passive range of motion without pain, normal range of motion and neck supple. Cardiovascular:      Rate and Rhythm: Normal rate and regular rhythm. Pulses: Normal pulses. Heart sounds: Normal heart sounds. Pulmonary:      Effort: Pulmonary effort is normal.      Breath sounds: Normal breath sounds. Abdominal:      General: Abdomen is flat. Bowel sounds are normal.      Palpations: Abdomen is soft. Musculoskeletal: Normal range of motion. General: Tenderness present. Left lower leg: He exhibits tenderness. Edema present. Skin:     General: Skin is warm. Capillary Refill: Capillary refill takes less than 2 seconds. Neurological:      General: No focal deficit present. Mental Status: He is alert and oriented to person, place, and time. Deep Tendon Reflexes: Reflexes are normal and symmetric. Psychiatric:         Attention and Perception: Attention and perception normal.         Mood and Affect: Mood normal.         Behavior: Behavior normal. Behavior is cooperative.            LABS:  Labs Reviewed   BASIC METABOLIC PANEL - Abnormal; Notable for the following components:       Result Value    Glucose 102 (*)     BUN 23 (*)     All other components within normal limits   CBC WITH AUTO DIFFERENTIAL - Abnormal; Notable for the following components:    RBC 4.05 (*)     Hemoglobin 11.9 (*)     Hematocrit 37.1 (*) MCHC 32.1 (*)     RDW 14.6 (*)     Monocytes Absolute 1.4 (*)     All other components within normal limits   URINE RT REFLEX TO CULTURE - Abnormal; Notable for the following components:    Protein,  (*)     All other components within normal limits   MICROSCOPIC URINALYSIS - Abnormal; Notable for the following components:    RBC, UA 5-10 (*)     Bacteria, UA MODERATE (*)     All other components within normal limits   CULTURE, URINE         MDM:   Vitals:    Vitals:    12/26/20 0906   BP: (!) 113/99   Pulse: 90   Resp: 16   Temp: 99.1 °F (37.3 °C)   TempSrc: Tympanic   SpO2: 95%   Weight: 220 lb (99.8 kg)   Height: 6' (1.829 m)       MDM  Number of Diagnoses or Management Options  Infective urethritis  Left leg pain  Diagnosis management comments: Patient presents with left lower extremity edema and back pain. Labs, left lower extremity ultrasound and IV ordered. Ultrasound is negative for DVT. His UA is positive for UTI. All of his labs were reviewed. He will be discharged back to his group home. I will give him Rx for Bactrim DS 1 tab PO BID. Patel ANDERSON     The lab results, radiology and test results were reviewed with the patient and family. The patient will follow up in 2 days with their primary care doctor. If their symptoms change or get worse they will return to the ER. CRITICAL CARE TIME   Total CriticalCare time was 0 minutes, excluding separately reportable procedures. There was a high probability of clinically significant/life threatening deterioration in the patient's condition which required my urgent intervention. PROCEDURES:  Unlessotherwise noted below, none     Procedures      FINAL IMPRESSION      1. Left leg pain    2.  Infective urethritis          DISPOSITION/PLAN   DISPOSITION Decision To Discharge 12/26/2020 10:58:50 AM          COLLEEN Serna DO (electronically signed)  Attending Emergency Physician          Agustin Hamilton DO  12/31/20 1006

## 2020-12-26 NOTE — ED NOTES
Bed: 08  Expected date:   Expected time:   Means of arrival:   Comments:     Luigi Conte RN  12/26/20 8664

## 2020-12-28 LAB
ORGANISM: ABNORMAL
URINE CULTURE, ROUTINE: ABNORMAL

## 2020-12-29 ENCOUNTER — HOSPITAL ENCOUNTER (OUTPATIENT)
Dept: INTERVENTIONAL RADIOLOGY/VASCULAR | Age: 50
Discharge: HOME | End: 2020-12-31
Payer: MEDICARE

## 2020-12-29 ENCOUNTER — VIRTUAL VISIT (OUTPATIENT)
Dept: GERIATRIC MEDICINE | Age: 50
End: 2020-12-29
Payer: MEDICARE

## 2020-12-29 DIAGNOSIS — N34.2 INFECTIVE URETHRITIS: ICD-10-CM

## 2020-12-29 PROCEDURE — 2709999900 IR PICC WO SQ PORT/PUMP > 5 YEARS

## 2020-12-29 PROCEDURE — 2580000003 HC RX 258: Performed by: PHYSICIAN ASSISTANT

## 2020-12-29 PROCEDURE — 2500000003 HC RX 250 WO HCPCS: Performed by: PHYSICIAN ASSISTANT

## 2020-12-29 PROCEDURE — 36573 INSJ PICC RS&I 5 YR+: CPT | Performed by: RADIOLOGY

## 2020-12-29 PROCEDURE — 99443 PR PHYS/QHP TELEPHONE EVALUATION 21-30 MIN: CPT | Performed by: PHYSICIAN ASSISTANT

## 2020-12-29 RX ORDER — SODIUM CHLORIDE 0.9 % (FLUSH) 0.9 %
10 SYRINGE (ML) INJECTION EVERY 12 HOURS SCHEDULED
Status: DISCONTINUED | OUTPATIENT
Start: 2020-12-29 | End: 2021-01-01 | Stop reason: HOSPADM

## 2020-12-29 RX ORDER — SODIUM CHLORIDE 0.9 % (FLUSH) 0.9 %
10 SYRINGE (ML) INJECTION PRN
Status: DISCONTINUED | OUTPATIENT
Start: 2020-12-29 | End: 2021-01-01 | Stop reason: HOSPADM

## 2020-12-29 RX ORDER — SODIUM CHLORIDE 9 MG/ML
250 INJECTION, SOLUTION INTRAVENOUS ONCE
Status: COMPLETED | OUTPATIENT
Start: 2020-12-29 | End: 2020-12-29

## 2020-12-29 RX ORDER — LIDOCAINE HYDROCHLORIDE 20 MG/ML
5 INJECTION, SOLUTION INFILTRATION; PERINEURAL ONCE
Status: COMPLETED | OUTPATIENT
Start: 2020-12-29 | End: 2020-12-29

## 2020-12-29 RX ADMIN — LIDOCAINE HYDROCHLORIDE 10 ML: 20 INJECTION, SOLUTION INFILTRATION; PERINEURAL at 17:26

## 2020-12-29 RX ADMIN — SODIUM CHLORIDE 250 ML: 9 INJECTION, SOLUTION INTRAVENOUS at 17:27

## 2020-12-29 NOTE — PROGRESS NOTES
I affirm this is a Patient Initiated Episode with a Patient who has not had a related appointment within my department in the past 7 days or scheduled within the next 24 hours.     Patient identification was verified at the start of the visit: Yes    Total Time: minutes: 21-30 minutes    Note: not billable if this call serves to triage the patient into an appointment for the relevant concern      Barbara Monique

## 2021-01-04 RX ORDER — IMIPENEM AND CILASTATIN 500; 500 MG/1; MG/1
500 INJECTION, POWDER, FOR SOLUTION INTRAVENOUS EVERY 6 HOURS
Status: ON HOLD | COMMUNITY
End: 2021-01-28 | Stop reason: HOSPADM

## 2021-01-06 LAB
BASOPHILS ABSOLUTE: 0.1 /ΜL
BASOPHILS RELATIVE PERCENT: 1 %
BUN BLDV-MCNC: 13 MG/DL
CALCIUM SERPL-MCNC: 8.3 MG/DL
CHLORIDE BLD-SCNC: 107 MMOL/L
CO2: 28 MMOL/L
CREAT SERPL-MCNC: 0.6 MG/DL
EOSINOPHILS ABSOLUTE: 0.2 /ΜL
EOSINOPHILS RELATIVE PERCENT: 3 %
GFR CALCULATED: NORMAL
GLUCOSE BLD-MCNC: 84 MG/DL
HCT VFR BLD CALC: 32.7 % (ref 41–53)
HEMOGLOBIN: 10.8 G/DL (ref 13.5–17.5)
LYMPHOCYTES ABSOLUTE: 2.3 /ΜL
LYMPHOCYTES RELATIVE PERCENT: 37.5 %
MCH RBC QN AUTO: 29.5 PG
MCHC RBC AUTO-ENTMCNC: 33 G/DL
MCV RBC AUTO: 89.5 FL
MONOCYTES ABSOLUTE: 0.6 /ΜL
MONOCYTES RELATIVE PERCENT: 9.8 %
NEUTROPHILS ABSOLUTE: 3 /ΜL
NEUTROPHILS RELATIVE PERCENT: 48.7 %
PLATELET # BLD: 210 K/ΜL
PMV BLD AUTO: 7.9 FL
POTASSIUM SERPL-SCNC: 3.6 MMOL/L
RBC # BLD: 3.65 10^6/ΜL
SODIUM BLD-SCNC: 144 MMOL/L
WBC # BLD: 6.1 10^3/ML

## 2021-01-07 NOTE — PROGRESS NOTES
PATIENT: Thelma Pitts : 1970 DOS: 10/21/2020     [facility] [place]    Vital signs are reviewed via , within normal limits. HPI: The patient is being seen today as a new admission at [facility]. The patient is being transferred from [address] [place] [address], in [address] [place] [address] for long term care at this facility. The patient has a history of urinary retention, moderate IDD, and   During stay at care, we had orders for routine straight catheterization, due to abdominal distention secondary to urinary retention. We will adapt orders to this facility in order to continue mitigating bladder distention and urinary retention and subsequent UTIs. The patient overall has had an uneventful past few month clinical course. He seems to be a little bit indifferent to his transfer here. Does have close relationship with family. Upon admission, we will do a CMP, Hgb A1c and CBC as well to establish at baseline for him while he is here. Otherwise, no new active changes per nursing staff. We will review last reconsultation notes to get up to speed on the patient's past three months of care. No further complaints per nursing staff. Will review medications, allergies, immunizations, and most recent laboratory work as well. MEDICATIONS: APAP 6 to 50 mg p.o. every 4 hours as needed, calcium carbonate 1000 mg p.o. daily. Every 4 hours, BuSpar 5 mg p.o. twice daily, Lamictal 200 mg p.o. twice daily, gabapentin 100 mg p.o. 3 times daily, Depakote ER 5 mg p.o. nightly, Depakote  mg p.o. twice daily, Diastat gel 10 mg rectally once daily as needed, Arriaga Aland 4 mg p.o. every 8 hours as needed, loratadine 10 mg p.o. every morning, propranolol 60 mg extended release p.o. daily, Mucinex 6 mg p.o. twice daily as needed, ferrous sulfate 325 mg p.o. twice daily with meals, Dulcolax RE 1 unit rectally daily as needed, baclofen 10 mg p.o. every morning, Synthroid 50 mcg p.o. every morning 30 minutes prior to breakfast.    ALLERGIES:  Cefazolin, Skelaxin, Duricef, simvastatin, tramadol, Vicodin. .    Social History     Socioeconomic History    Marital status: Single     Spouse name: Not on file    Number of children: Not on file    Years of education: Not on file    Highest education level: Not on file   Occupational History    Not on file   Social Needs    Financial resource strain: Not on file    Food insecurity     Worry: Not on file     Inability: Not on file    Transportation needs     Medical: Not on file     Non-medical: Not on file   Tobacco Use    Smoking status: Never Smoker    Smokeless tobacco: Never Used   Substance and Sexual Activity    Alcohol use: Never     Frequency: Never    Drug use: Never    Sexual activity: Not Currently   Lifestyle    Physical activity     Days per week: Not on file     Minutes per session: Not on file    Stress: Not on file   Relationships    Social connections     Talks on phone: Not on file     Gets together: Not on file     Attends Mandaen service: Not on file     Active member of club or organization: Not on file     Attends meetings of clubs or organizations: Not on file     Relationship status: Not on file    Intimate partner violence     Fear of current or ex partner: Not on file Emotionally abused: Not on file     Physically abused: Not on file     Forced sexual activity: Not on file   Other Topics Concern    Not on file   Social History Narrative    Not on file     No family history on file. There is no immunization history on file for this patient. (See pt. Paper chart for details)      REVIEW OF SYSTEMS:  Constitutional:  Denies any fever, chills, nausea, vomiting, weakness or fatigue. The patient does have significant IDD prohibiting good history giving. Neurologic:  Does have slight slurring of speech, focal weakness of bilateral lower legs, difficulty walking, and generalized weakness. Denies any new headache, LOC, lightheadedness. Does show mild confusion, secondary to IDD. Cardiopulmonary:  Denies any chest pain, orthopnea, [place], denies any wheezing, POI or shortness of breath. Gastrointestinal:  No specific complaints. Denies abdominal pain, constipation, or diarrhea. Does have a good appetite. The patient is obese. :  Denies any polyuria, hematuria, is incontinent of urine. He does require straight catheterization. Psychiatric:  The patient does have history of IDD. Denies any depression or anxiety. Remaining 14-point ROS unremarkable. PHYSICAL EXAMINATION:  General:  Fair hygiene, bright affect, no acute distress, fairly pleasant. HEENT:  Pupils are equal, round, and active to light. No injection or icterus. MMM:  No erythema or exudate. Cardiopulmonary:  RRR. Intact distal pulses bilaterally. No JVD. Minor lower extremity edema. Lung sounds are overall clear to auscultation bilaterally throughout. No wheezes, rales or rhonchi noted. Normal bowel sounds, soft and nontender abdomen. Mental Status: The patient is awake, alert and oriented, 2/3. Strength is 5/5 upper extremities and 3-4/5 lower extremities. Intact sensation to bilateral lower legs.       A&P: 1.  Seizure disorder-patient is followed by neurology. Continue current medications as prescribed. Patient follow-up with neurology as needed as well as for routine follow-up. 2.  Urinary incontinence/retention-continue straight cath orders. Will utilize Jameson catheter if ineffective. Will monitor for recurrent UTI. 3.  Recurrent UTI-see #2  4. Muscle spasm/muscle pain-continue baclofen. No new orders at this time. 5.  Anemia-continue iron supplementation. Will monitor iron levels and CBC as needed. 6.  GERD-no new symptoms. Continue current meds. Monitor for acute changes. 7.  Hypertension-continue current medications monitor vital signs routinely. 8.  Hypothyroidism-we will continue current Synthroid orders. Will adjust per TSH/free T4 level as needed. No new symptoms of acute hyper or hyper thyroidism. 9.  Moderate IDD-patient is close with family who are his POA's. We will continue skilled nursing care as currently prescribed to patient.     Electronically Signed By: Dex Arzola PA-C on 12/30/2020 11:31:40  ______________________________  ORALIA Valadez/[id number]  D: 12/27/2020 21:45:58  T: 12/28/2020 13:22:23    cc: Cal Aldana

## 2021-01-08 ENCOUNTER — VIRTUAL VISIT (OUTPATIENT)
Dept: GERIATRIC MEDICINE | Age: 51
End: 2021-01-08
Payer: MEDICARE

## 2021-01-08 DIAGNOSIS — B96.89 URINARY TRACT INFECTION DUE TO ESBL KLEBSIELLA: ICD-10-CM

## 2021-01-08 DIAGNOSIS — N39.0 URINARY TRACT INFECTION DUE TO ESBL KLEBSIELLA: ICD-10-CM

## 2021-01-08 DIAGNOSIS — N39.0 RECURRENT UTI: Primary | ICD-10-CM

## 2021-01-08 LAB
BASOPHILS ABSOLUTE: ABNORMAL
BASOPHILS RELATIVE PERCENT: 0.4 %
BUN BLDV-MCNC: 16 MG/DL
CALCIUM SERPL-MCNC: 8.4 MG/DL
CHLORIDE BLD-SCNC: 105 MMOL/L
CO2: 28 MMOL/L
CREAT SERPL-MCNC: 0.7 MG/DL
EOSINOPHILS ABSOLUTE: 0.3 /ΜL
EOSINOPHILS RELATIVE PERCENT: 3 %
GFR CALCULATED: NORMAL
GLUCOSE BLD-MCNC: 71 MG/DL
HCT VFR BLD CALC: 31.4 % (ref 41–53)
HEMOGLOBIN: 10.6 G/DL (ref 13.5–17.5)
LYMPHOCYTES ABSOLUTE: 2.7 /ΜL
LYMPHOCYTES RELATIVE PERCENT: 28.7 %
MCH RBC QN AUTO: 30 PG
MCHC RBC AUTO-ENTMCNC: 33.8 G/DL
MCV RBC AUTO: 88.7 FL
MONOCYTES ABSOLUTE: 0.7 /ΜL
MONOCYTES RELATIVE PERCENT: 7.9 %
NEUTROPHILS ABSOLUTE: 5.6 /ΜL
NEUTROPHILS RELATIVE PERCENT: 60 %
PLATELET # BLD: 210 K/ΜL
PMV BLD AUTO: 7.9 FL
POTASSIUM SERPL-SCNC: 3.7 MMOL/L
RBC # BLD: 3.54 10^6/ΜL
SODIUM BLD-SCNC: 143 MMOL/L
WBC # BLD: 9.4 10^3/ML

## 2021-01-08 PROCEDURE — 99442 PR PHYS/QHP TELEPHONE EVALUATION 11-20 MIN: CPT | Performed by: PHYSICIAN ASSISTANT

## 2021-01-10 PROBLEM — D50.9 IRON DEFICIENCY ANEMIA: Status: ACTIVE | Noted: 2021-01-10

## 2021-01-10 PROBLEM — K21.9 GASTROESOPHAGEAL REFLUX DISEASE WITHOUT ESOPHAGITIS: Status: ACTIVE | Noted: 2021-01-10

## 2021-01-10 PROBLEM — R39.81 FUNCTIONAL URINARY INCONTINENCE: Status: ACTIVE | Noted: 2021-01-10

## 2021-01-12 NOTE — PROGRESS NOTES
Arabella Gomez is a 44 y.o. male evaluated via telephone on 10/28/2020. : 1970    Consent:  He and/or health care decision maker is aware that that he may receive a bill for this telephone service, depending on his insurance coverage, and has provided verbal consent to proceed: Yes      Documentation:  I communicated with the patient and/or health care decision maker about       Mervin Hunt     Seen via tele visit. The patient was evaluated today for history of urinary retention. The patient has moderate IDD. Discussed with nurse present during routine Jameson catheter versus routine straight cath. We will continue straight cathing as needed. The patient does utilize brief, has urinary retention history and history of recurrent UTIs. The patient does not maintain fair self urination, although with incontinence. Monitoring for any signs or symptoms of retention. There is no bladder scan available. We will monitor residuals if straight catheterization is utilized. Electronically Signed By: Mary Michelle PA-C on 2021 23:48:07  ______________________________  ORALIA Hugo/[id number]  D: 2021 21:11:12  T: 2021 17:12:29     cc: - [facility]    I affirm this is a Patient Initiated Episode with a Patient who has not had a related appointment within my department in the past 7 days or scheduled within the next 24 hours.     Patient identification was verified at the start of the visit: Yes    Total Time: minutes: 11-20 minutes    Note: not billable if this call serves to triage the patient into an appointment for the relevant concern      Mary Michelle

## 2021-01-13 ENCOUNTER — OFFICE VISIT (OUTPATIENT)
Dept: GERIATRIC MEDICINE | Age: 51
End: 2021-01-13
Payer: MEDICARE

## 2021-01-13 DIAGNOSIS — R33.9 URINARY RETENTION WITH INCOMPLETE BLADDER EMPTYING: Primary | ICD-10-CM

## 2021-01-13 DIAGNOSIS — Z97.8 FOLEY CATHETER STATUS: ICD-10-CM

## 2021-01-13 PROCEDURE — 99442 PR PHYS/QHP TELEPHONE EVALUATION 11-20 MIN: CPT | Performed by: PHYSICIAN ASSISTANT

## 2021-01-19 NOTE — PROGRESS NOTES
Chas Gonzalez is a 44 y.o. male evaluated via telephone on 2020. Consent:  He and/or health care decision maker is aware that that he may receive a bill for this telephone service, depending on his insurance coverage, and has provided verbal consent to proceed: Yes      Documentation:  I communicated with the patient and/or health care decision maker about     PATIENT: Meagan Reddy : 1970 DOS: 2020     [facility]    Vital signs reviewed. The patient is having ongoing issues with urinary retention. Has had this issue since he was at [facility]. Discussed parameters for ongoing urinary straight catheterization. The patient has orders for t.i.d. straight cath due to urinary retention. We will not continue this. We will allow the patient to urinate on his own in brief and/or with assistance. However, we will continue doing straight cath q.h.s. to maintain drainage of bladder on a nightly basis. We will return to urology for further consultation as needed. If any continual evidence of retention, straining, pain, or any other urinary abnormalities, we will resort back to three times a day straight cath. Electronically Signed By: Issac Gilliam PA-C on 2021 22:48:03  ______________________________  ORALIA Vaz/[id number]  D: 2021 53:92:39  T: 2021 17:16:23    cc: - [facility]. Details of this discussion including any medical advice provided: see above      I affirm this is a Patient Initiated Episode with a Patient who has not had a related appointment within my department in the past 7 days or scheduled within the next 24 hours.     Patient identification was verified at the start of the visit: Yes    Total Time: minutes: 11-20 minutes    Note: not billable if this call serves to triage the patient into an appointment for the relevant concern      Issac Gilliam

## 2021-01-22 ENCOUNTER — APPOINTMENT (OUTPATIENT)
Dept: GENERAL RADIOLOGY | Age: 51
DRG: 690 | End: 2021-01-22
Payer: MEDICARE

## 2021-01-22 ENCOUNTER — HOSPITAL ENCOUNTER (INPATIENT)
Age: 51
LOS: 6 days | Discharge: NURSING, REHAB OR LONG-TERM CARE | DRG: 690 | End: 2021-01-28
Attending: EMERGENCY MEDICINE | Admitting: INTERNAL MEDICINE
Payer: MEDICARE

## 2021-01-22 DIAGNOSIS — N30.00 ACUTE CYSTITIS WITHOUT HEMATURIA: ICD-10-CM

## 2021-01-22 DIAGNOSIS — R50.9 FEBRILE ILLNESS, ACUTE: Primary | ICD-10-CM

## 2021-01-22 PROBLEM — N39.0 UTI (URINARY TRACT INFECTION): Status: ACTIVE | Noted: 2021-01-22

## 2021-01-22 LAB
ALBUMIN SERPL-MCNC: 4.1 G/DL (ref 3.5–4.6)
ALP BLD-CCNC: 61 U/L (ref 35–104)
ALT SERPL-CCNC: 8 U/L (ref 0–41)
AMYLASE: 61 U/L (ref 22–93)
ANION GAP SERPL CALCULATED.3IONS-SCNC: 13 MEQ/L (ref 9–15)
AST SERPL-CCNC: 20 U/L (ref 0–40)
BACTERIA: ABNORMAL /HPF
BASOPHILS ABSOLUTE: 0 K/UL (ref 0–0.2)
BASOPHILS RELATIVE PERCENT: 0.7 %
BILIRUB SERPL-MCNC: 0.5 MG/DL (ref 0.2–0.7)
BILIRUBIN URINE: ABNORMAL
BLOOD, URINE: ABNORMAL
BUN BLDV-MCNC: 12 MG/DL (ref 6–20)
CALCIUM SERPL-MCNC: 9.8 MG/DL (ref 8.5–9.9)
CHLORIDE BLD-SCNC: 104 MEQ/L (ref 95–107)
CLARITY: ABNORMAL
CO2: 25 MEQ/L (ref 20–31)
COLOR: YELLOW
CREAT SERPL-MCNC: 0.81 MG/DL (ref 0.7–1.2)
EOSINOPHILS ABSOLUTE: 0.3 K/UL (ref 0–0.7)
EOSINOPHILS RELATIVE PERCENT: 5.6 %
EPITHELIAL CELLS, UA: ABNORMAL /HPF
GFR AFRICAN AMERICAN: >60
GFR NON-AFRICAN AMERICAN: >60
GLOBULIN: 2.7 G/DL (ref 2.3–3.5)
GLUCOSE BLD-MCNC: 87 MG/DL (ref 70–99)
GLUCOSE URINE: NEGATIVE MG/DL
HCT VFR BLD CALC: 36 % (ref 42–52)
HEMOGLOBIN: 11.7 G/DL (ref 14–18)
KETONES, URINE: 15 MG/DL
LEUKOCYTE ESTERASE, URINE: ABNORMAL
LIPASE: 34 U/L (ref 12–95)
LYMPHOCYTES ABSOLUTE: 1.5 K/UL (ref 1–4.8)
LYMPHOCYTES RELATIVE PERCENT: 31 %
MCH RBC QN AUTO: 28.8 PG (ref 27–31.3)
MCHC RBC AUTO-ENTMCNC: 32.6 % (ref 33–37)
MCV RBC AUTO: 88.3 FL (ref 80–100)
MONOCYTES ABSOLUTE: 0.4 K/UL (ref 0.2–0.8)
MONOCYTES RELATIVE PERCENT: 9.2 %
NEUTROPHILS ABSOLUTE: 2.6 K/UL (ref 1.4–6.5)
NEUTROPHILS RELATIVE PERCENT: 53.5 %
NITRITE, URINE: NEGATIVE
PDW BLD-RTO: 15.4 % (ref 11.5–14.5)
PH UA: 7 (ref 5–9)
PLATELET # BLD: 167 K/UL (ref 130–400)
POTASSIUM SERPL-SCNC: 3.7 MEQ/L (ref 3.4–4.9)
PROTEIN UA: 100 MG/DL
RBC # BLD: 4.08 M/UL (ref 4.7–6.1)
RBC UA: ABNORMAL /HPF (ref 0–2)
SARS-COV-2, NAAT: NOT DETECTED
SODIUM BLD-SCNC: 142 MEQ/L (ref 135–144)
SPECIFIC GRAVITY UA: 1.02 (ref 1–1.03)
TOTAL PROTEIN: 6.8 G/DL (ref 6.3–8)
URINE REFLEX TO CULTURE: YES
UROBILINOGEN, URINE: 1 E.U./DL
WBC # BLD: 4.8 K/UL (ref 4.8–10.8)
WBC UA: >100 /HPF (ref 0–5)

## 2021-01-22 PROCEDURE — 87077 CULTURE AEROBIC IDENTIFY: CPT

## 2021-01-22 PROCEDURE — 81001 URINALYSIS AUTO W/SCOPE: CPT

## 2021-01-22 PROCEDURE — 6370000000 HC RX 637 (ALT 250 FOR IP): Performed by: INTERNAL MEDICINE

## 2021-01-22 PROCEDURE — 74018 RADEX ABDOMEN 1 VIEW: CPT

## 2021-01-22 PROCEDURE — 83690 ASSAY OF LIPASE: CPT

## 2021-01-22 PROCEDURE — 80053 COMPREHEN METABOLIC PANEL: CPT

## 2021-01-22 PROCEDURE — 99284 EMERGENCY DEPT VISIT MOD MDM: CPT

## 2021-01-22 PROCEDURE — 82150 ASSAY OF AMYLASE: CPT

## 2021-01-22 PROCEDURE — 87186 SC STD MICRODIL/AGAR DIL: CPT

## 2021-01-22 PROCEDURE — U0002 COVID-19 LAB TEST NON-CDC: HCPCS

## 2021-01-22 PROCEDURE — 96375 TX/PRO/DX INJ NEW DRUG ADDON: CPT

## 2021-01-22 PROCEDURE — 1210000000 HC MED SURG R&B

## 2021-01-22 PROCEDURE — 36415 COLL VENOUS BLD VENIPUNCTURE: CPT

## 2021-01-22 PROCEDURE — 87086 URINE CULTURE/COLONY COUNT: CPT

## 2021-01-22 PROCEDURE — 2580000003 HC RX 258: Performed by: EMERGENCY MEDICINE

## 2021-01-22 PROCEDURE — 96374 THER/PROPH/DIAG INJ IV PUSH: CPT

## 2021-01-22 PROCEDURE — 6360000002 HC RX W HCPCS: Performed by: EMERGENCY MEDICINE

## 2021-01-22 PROCEDURE — 85025 COMPLETE CBC W/AUTO DIFF WBC: CPT

## 2021-01-22 RX ORDER — AMLODIPINE BESYLATE 2.5 MG/1
2.5 TABLET ORAL DAILY
Status: ON HOLD | COMMUNITY
End: 2021-01-28 | Stop reason: HOSPADM

## 2021-01-22 RX ORDER — LORAZEPAM 2 MG/ML
1 INJECTION INTRAMUSCULAR ONCE
Status: COMPLETED | OUTPATIENT
Start: 2021-01-22 | End: 2021-01-22

## 2021-01-22 RX ORDER — BUSPIRONE HYDROCHLORIDE 5 MG/1
5 TABLET ORAL 2 TIMES DAILY
Status: DISCONTINUED | OUTPATIENT
Start: 2021-01-22 | End: 2021-01-28 | Stop reason: HOSPADM

## 2021-01-22 RX ORDER — BISACODYL 10 MG
10 SUPPOSITORY, RECTAL RECTAL DAILY PRN
Status: DISCONTINUED | OUTPATIENT
Start: 2021-01-22 | End: 2021-01-28 | Stop reason: HOSPADM

## 2021-01-22 RX ORDER — BACLOFEN 10 MG/1
10 TABLET ORAL EVERY MORNING
Status: DISCONTINUED | OUTPATIENT
Start: 2021-01-23 | End: 2021-01-28 | Stop reason: HOSPADM

## 2021-01-22 RX ORDER — DIVALPROEX SODIUM 250 MG/1
750 TABLET, EXTENDED RELEASE ORAL 2 TIMES DAILY
Status: DISCONTINUED | OUTPATIENT
Start: 2021-01-22 | End: 2021-01-22

## 2021-01-22 RX ORDER — ONDANSETRON 4 MG/1
4 TABLET, ORALLY DISINTEGRATING ORAL EVERY 8 HOURS PRN
Status: DISCONTINUED | OUTPATIENT
Start: 2021-01-22 | End: 2021-01-28 | Stop reason: HOSPADM

## 2021-01-22 RX ORDER — GABAPENTIN 100 MG/1
100 CAPSULE ORAL 3 TIMES DAILY
Status: DISCONTINUED | OUTPATIENT
Start: 2021-01-22 | End: 2021-01-28 | Stop reason: HOSPADM

## 2021-01-22 RX ORDER — ACETAMINOPHEN 325 MG/1
650 TABLET ORAL EVERY 6 HOURS PRN
Status: DISCONTINUED | OUTPATIENT
Start: 2021-01-22 | End: 2021-01-28 | Stop reason: HOSPADM

## 2021-01-22 RX ORDER — ONDANSETRON 2 MG/ML
4 INJECTION INTRAMUSCULAR; INTRAVENOUS ONCE
Status: COMPLETED | OUTPATIENT
Start: 2021-01-22 | End: 2021-01-22

## 2021-01-22 RX ORDER — DIVALPROEX SODIUM 250 MG/1
750 TABLET, EXTENDED RELEASE ORAL NIGHTLY
Status: DISCONTINUED | OUTPATIENT
Start: 2021-01-22 | End: 2021-01-28 | Stop reason: HOSPADM

## 2021-01-22 RX ORDER — DIVALPROEX SODIUM 500 MG/1
500 TABLET, EXTENDED RELEASE ORAL NIGHTLY
Status: DISCONTINUED | OUTPATIENT
Start: 2021-01-22 | End: 2021-01-22

## 2021-01-22 RX ORDER — 0.9 % SODIUM CHLORIDE 0.9 %
1000 INTRAVENOUS SOLUTION INTRAVENOUS ONCE
Status: COMPLETED | OUTPATIENT
Start: 2021-01-22 | End: 2021-01-22

## 2021-01-22 RX ORDER — DIVALPROEX SODIUM 250 MG/1
250 TABLET, EXTENDED RELEASE ORAL 2 TIMES DAILY
Status: DISCONTINUED | OUTPATIENT
Start: 2021-01-22 | End: 2021-01-28 | Stop reason: HOSPADM

## 2021-01-22 RX ORDER — KETOROLAC TROMETHAMINE 30 MG/ML
30 INJECTION, SOLUTION INTRAMUSCULAR; INTRAVENOUS ONCE
Status: COMPLETED | OUTPATIENT
Start: 2021-01-22 | End: 2021-01-22

## 2021-01-22 RX ORDER — LEVOTHYROXINE SODIUM 0.05 MG/1
50 TABLET ORAL DAILY
Status: DISCONTINUED | OUTPATIENT
Start: 2021-01-22 | End: 2021-01-28 | Stop reason: HOSPADM

## 2021-01-22 RX ORDER — SODIUM CHLORIDE 0.9 % (FLUSH) 0.9 %
3 SYRINGE (ML) INJECTION EVERY 8 HOURS
Status: DISCONTINUED | OUTPATIENT
Start: 2021-01-22 | End: 2021-01-28 | Stop reason: HOSPADM

## 2021-01-22 RX ORDER — PROPRANOLOL HCL 60 MG
60 CAPSULE, EXTENDED RELEASE 24HR ORAL DAILY
Status: DISCONTINUED | OUTPATIENT
Start: 2021-01-22 | End: 2021-01-22

## 2021-01-22 RX ORDER — LAMOTRIGINE 100 MG/1
200 TABLET ORAL 2 TIMES DAILY
Status: DISCONTINUED | OUTPATIENT
Start: 2021-01-22 | End: 2021-01-28 | Stop reason: HOSPADM

## 2021-01-22 RX ADMIN — ONDANSETRON 4 MG: 2 INJECTION INTRAMUSCULAR; INTRAVENOUS at 11:26

## 2021-01-22 RX ADMIN — LAMOTRIGINE 200 MG: 100 TABLET ORAL at 20:50

## 2021-01-22 RX ADMIN — Medication 3 ML: at 12:40

## 2021-01-22 RX ADMIN — KETOROLAC TROMETHAMINE 30 MG: 30 INJECTION, SOLUTION INTRAMUSCULAR at 11:26

## 2021-01-22 RX ADMIN — DIVALPROEX SODIUM 750 MG: 250 TABLET, FILM COATED, EXTENDED RELEASE ORAL at 20:52

## 2021-01-22 RX ADMIN — DIVALPROEX SODIUM 250 MG: 250 TABLET, FILM COATED, EXTENDED RELEASE ORAL at 20:53

## 2021-01-22 RX ADMIN — BUSPIRONE HYDROCHLORIDE 5 MG: 5 TABLET ORAL at 20:51

## 2021-01-22 RX ADMIN — MEROPENEM 1000 MG: 1 INJECTION, POWDER, FOR SOLUTION INTRAVENOUS at 12:39

## 2021-01-22 RX ADMIN — SODIUM CHLORIDE 1000 ML: 9 INJECTION, SOLUTION INTRAVENOUS at 11:26

## 2021-01-22 RX ADMIN — GABAPENTIN 100 MG: 100 CAPSULE ORAL at 17:34

## 2021-01-22 RX ADMIN — GABAPENTIN 100 MG: 100 CAPSULE ORAL at 20:50

## 2021-01-22 RX ADMIN — LORAZEPAM 1 MG: 2 INJECTION INTRAMUSCULAR; INTRAVENOUS at 12:17

## 2021-01-22 ASSESSMENT — ENCOUNTER SYMPTOMS
DIARRHEA: 1
VOMITING: 0
EYE REDNESS: 0
TROUBLE SWALLOWING: 0
CHEST TIGHTNESS: 0
BLOOD IN STOOL: 0
WHEEZING: 0
COUGH: 0
SORE THROAT: 0
ABDOMINAL PAIN: 1
EYE PAIN: 0
CHOKING: 0
EYE DISCHARGE: 0
CONSTIPATION: 0
SINUS PRESSURE: 0
FACIAL SWELLING: 0
SHORTNESS OF BREATH: 0
VOICE CHANGE: 0
STRIDOR: 0
BACK PAIN: 0

## 2021-01-22 ASSESSMENT — PAIN SCALES - GENERAL: PAINLEVEL_OUTOF10: 0

## 2021-01-22 NOTE — ED TRIAGE NOTES
Patient sentt in by [facility] for loose stool . He has a low grade temp. Lungs clear bilat.  Denies pain

## 2021-01-22 NOTE — ED NOTES
Informed nursing supervisor pt is ready to be taken to the floor.       Yuri Rodriguez  01/22/21 [id number]

## 2021-01-22 NOTE — ED PROVIDER NOTES
[facility] ED  eMERGENCY dEPARTMENT eNCOUnter      Pt Name: Anna Meyer  MRN: [id number]  [place] 1970  Date of evaluation: 1/22/2021  Provider: Paola Ventura MD    [address] [place] [address]       Chief Complaint   Patient presents with    Diarrhea         HISTORY OF PRESENT ILLNESS   (Location/Symptom, Timing/Onset,Context/Setting, Quality, Duration, Modifying Factors, Severity)  Note limiting factors. Anna Meyer is a 44 y.o. male who presents to the emergency department patient is from group home did receive his second corona vaccination 2 days ago got sick afterwards has low-grade fever diarrhea loose watery stool as per group [place] and sent here for evaluation no fever no short of breath with history of hypothyroidism hypertension mild mental retardation and history of remote small bowel obstruction patient has no projectile vomiting grade fever noted in the nursing home no short of breath patient has remote lithotripsy in the past because a kidney stone, today being less active sent here for further evaluation paramedics found him in stable condition    HPI    NursingNotes were reviewed. REVIEW OF SYSTEMS    (2-9 systems for level 4, 10 or more for level 5)     Review of Systems   Constitutional: Positive for activity change, appetite change, chills, diaphoresis and fatigue. Negative for fever. HENT: Negative for congestion, drooling, facial swelling, mouth sores, nosebleeds, sinus pressure, sore throat, trouble swallowing and voice change. Eyes: Negative for pain, discharge, redness and visual disturbance. Respiratory: Negative for cough, choking, chest tightness, shortness of breath, wheezing and stridor. Cardiovascular: Negative for chest pain, palpitations and leg swelling. Gastrointestinal: Positive for abdominal pain and diarrhea. Negative for blood in stool, constipation and vomiting. Endocrine: Negative for cold intolerance, polyphagia and polyuria. Genitourinary: Negative for dysuria, flank pain, frequency, genital sores and urgency. Musculoskeletal: Negative for back pain, joint swelling, neck pain and neck stiffness. Skin: Negative for pallor and rash. Neurological: Positive for weakness. Negative for tremors, seizures, syncope, numbness and headaches. Hematological: Negative for adenopathy. Does not bruise/bleed easily. Psychiatric/Behavioral: Negative for agitation, behavioral problems, hallucinations and sleep disturbance. The patient is not hyperactive. All other systems reviewed and are negative. Except as noted above the remainder of the review of systems was reviewed and negative. PAST MEDICAL HISTORY     Past Medical History:   Diagnosis Date    Anemia     CKD (chronic kidney disease)     [facility] mellitus [facility]     Has a tremor     Hyperlipidemia     Hypokalemia     Intellectual disability     Kidney stone     Lennox-Gastaut syndrome (HCC)     Paralytic ileus ([facility] [facility]     Thyroid disease     Venous thrombosis and embolism          SURGICALHISTORY       Past Surgical History:   Procedure Laterality Date    LITHOTRIPSY  07/30/2020         CURRENT MEDICATIONS       Previous Medications    ACETAMINOPHEN (TYLENOL) 325 MG TABLET    Take 650 mg by mouth every 4 hours as needed for Pain or Fever (pain or temp greater than 100)    BACLOFEN (LIORESAL) 10 MG TABLET    Take 10 mg by mouth every morning    BISACODYL (DULCOLAX RE)    Place 1 Units rectally daily as needed (constipation)    BUSPIRONE (BUSPAR) 5 MG TABLET    Take 5 mg by mouth 2 times daily    CALCIUM CARBONATE (TUMS EX) 750 MG CHEWABLE TABLET    Take 1,000 mg by mouth every 4 hours as needed for Heartburn     DIAZEPAM (DIASTAT) 10 MG GEL    Place 10 mg rectally once as needed.     DIVALPROEX (DEPAKOTE ER) 250 MG EXTENDED RELEASE TABLET    Take 750 mg by mouth 2 times daily     DIVALPROEX (DEPAKOTE ER) 500 MG EXTENDED RELEASE TABLET    Take 500 mg by mouth nightly FERROUS SULFATE (IRON 325) 325 (65 FE) MG TABLET    Take 1 tablet by mouth 2 times daily (with meals)    GABAPENTIN (NEURONTIN) 100 MG CAPSULE    Take 1 capsule by mouth 3 times daily for 30 days. GUAIFENESIN (MUCINEX) 600 MG EXTENDED RELEASE TABLET    Take 600 mg by mouth 2 times daily as needed for Congestion For chest congestion    IMIPENEM-CILASTATIN (PRIMAXIN) 500 MG INJECTION    Infuse 500 mg intravenously every 6 hours X 14 days    LAMOTRIGINE (LAMICTAL) 200 MG TABLET    Take 200 mg by mouth 2 times daily    LEVOTHYROXINE (SYNTHROID) 50 MCG TABLET    Take 50 mcg by mouth Daily    LORATADINE (CLARITIN) 10 MG TABLET    Take 10 mg by mouth daily    ONDANSETRON (ZOFRAN) 4 MG TABLET    Take 4 mg by mouth every 8 hours as needed for Nausea or Vomiting    PROPRANOLOL (INDERAL LA) 60 MG EXTENDED RELEASE CAPSULE    Take 60 mg by mouth daily       ALLERGIES     Cefazolin, Celontin [methsuximide], Duricef [cefadroxil], Simvastatin, Tramadol, and Vicodin [hydrocodone-acetaminophen]    FAMILY HISTORY     No family history on file.        SOCIAL HISTORY       Social History     Socioeconomic History    Marital status: Single     Spouse name: Not on file    Number of children: Not on file    Years of education: Not on file    Highest education level: Not on file   Occupational History    Not on file   Social Needs    Financial resource strain: Not on file    Food insecurity     Worry: Not on file     Inability: Not on file    Transportation needs     Medical: Not on file     Non-medical: Not on file   Tobacco Use    Smoking status: Never Smoker    Smokeless tobacco: Never Used   Substance and Sexual Activity    Alcohol use: Never     Frequency: Never    Drug use: Never    Sexual activity: Not Currently   Lifestyle    Physical activity     Days per week: Not on file     Minutes per session: Not on file    Stress: Not on file   Relationships    Social connections     Talks on phone: Not on file     Gets together: Not on file     Attends Mandaen service: Not on file     Active member of club or organization: Not on file     Attends meetings of clubs or organizations: Not on file     Relationship status: Not on file    Intimate partner violence     Fear of current or ex partner: Not on file     Emotionally abused: Not on file     Physically abused: Not on file     Forced sexual activity: Not on file   Other Topics Concern    Not on file   Social History Narrative    Not on file       SCREENINGS      @[id number])@      PHYSICAL EXAM    (up to 7 for level 4, 8 or more for level 5)     ED Triage Vitals   BP Temp Temp src Pulse Resp SpO2 Height Weight   -- -- -- -- -- -- -- --       Physical Exam  Vitals signs and nursing note reviewed. Constitutional:       General: He is not in acute distress. Appearance: He is not toxic-appearing. Comments: Alert cooperative patient in no acute distress noted patient follows verbal commands   HENT:      Head: Atraumatic. Right Ear: Tympanic membrane, ear canal and external ear normal.      Left Ear: Tympanic membrane, ear canal and external ear normal.      Nose: No congestion or rhinorrhea. Mouth/Throat:      Pharynx: No oropharyngeal exudate or posterior oropharyngeal erythema. Eyes:      General:         Right eye: No discharge. Left eye: No discharge. Extraocular Movements: Extraocular movements intact. Pupils: Pupils are equal, round, and reactive to light. Neck:      Musculoskeletal: Normal range of motion and neck supple. No neck rigidity or muscular tenderness. Cardiovascular:      Rate and Rhythm: Normal rate and regular rhythm. Heart sounds: No murmur. No friction rub. No gallop. Pulmonary:      Effort: No respiratory distress. Breath sounds: No stridor. No wheezing, rhonchi or rales. Abdominal:      General: Bowel sounds are normal. There is no distension. Palpations: Abdomen is soft. There is no mass. Tenderness: There is no abdominal tenderness. There is no right CVA tenderness, left CVA tenderness, guarding or rebound. Hernia: No hernia is present. Comments: Attention come to the abdomen patient abdomen is soft no distention no guarding no rebound tenderness good bowel sounds   Musculoskeletal:         General: No swelling or deformity. Lymphadenopathy:      Cervical: No cervical adenopathy. Skin:     General: Skin is warm. Capillary Refill: Capillary refill takes less than 2 seconds. Coloration: Skin is not jaundiced. Findings: No bruising or lesion. Neurological:      General: No focal deficit present. Mental Status: He is alert. Cranial Nerves: No cranial nerve deficit. Sensory: No sensory deficit. Coordination: Coordination normal.      Gait: Gait normal.      Deep Tendon Reflexes: Reflexes normal.   Psychiatric:         Mood and Affect: Mood normal.         DIAGNOSTIC RESULTS     EKG: All EKG's are interpreted by the Emergency Department Physician who either signs or Co-signsthis chart in the absence of a cardiologist.        RADIOLOGY:   Cindy Karen such as CT, Ultrasound and MRI are read by the radiologist. Plain radiographic images are visualized and preliminarily interpreted by the emergency physician with the below findings:      Interpretation per the Radiologist below, if available at the time ofthis note:    XR ABDOMEN (KUB) (SINGLE AP VIEW)    (Results Pending)         ED BEDSIDE ULTRASOUND:   Performed by ED Physician - none    LABS:  Labs Reviewed   COMPREHENSIVE METABOLIC PANEL   CBC WITH AUTO DIFFERENTIAL   URINE RT REFLEX TO CULTURE   LIPASE   AMYLASE       All other labs were within normal range or not returned as of this dictation. EMERGENCY DEPARTMENT COURSE and DIFFERENTIAL DIAGNOSIS/MDM:   Vitals: There were no vitals filed for this visit.     MDM    CRITICAL CARE TIME   Total Critical Care time was minutes, excluding separately reportableprocedures. There was a high probability of clinicallysignificant/life threatening deterioration in the patient's condition which required my urgent intervention. CONSULTS:  None    PROCEDURES:  Unless otherwise noted below, none     Procedures    FINAL IMPRESSION    No diagnosis found. DISPOSITION/PLAN   DISPOSITION        PATIENT REFERRED TO:  No follow-up provider specified.     DISCHARGE MEDICATIONS:  New Prescriptions    No medications on file          (Please note that portions of this note were completed with a voice recognition program.  Efforts were made to edit the dictations but occasionally words are mis-transcribed.)    Mariano Marti MD (electronically signed)  Attending Emergency Physician       Mariano Marti MD  01/22/21 [id number]

## 2021-01-22 NOTE — Clinical Note
Patient Class: Inpatient [101]   REQUIRED: Diagnosis: UTI (urinary tract infection) [988530]   Estimated Length of Stay: Estimated stay of more than 2 midnights   Admitting Provider: Joaquin Mo [[id number]]   Telemetry Bed Required?: Yes

## 2021-01-23 ENCOUNTER — APPOINTMENT (OUTPATIENT)
Dept: CT IMAGING | Age: 51
DRG: 690 | End: 2021-01-23
Payer: MEDICARE

## 2021-01-23 LAB
ANION GAP SERPL CALCULATED.3IONS-SCNC: 10 MEQ/L (ref 9–15)
BASOPHILS ABSOLUTE: 0 K/UL (ref 0–0.2)
BASOPHILS RELATIVE PERCENT: 0.2 %
BUN BLDV-MCNC: 19 MG/DL (ref 6–20)
CALCIUM SERPL-MCNC: 9.7 MG/DL (ref 8.5–9.9)
CHLORIDE BLD-SCNC: 104 MEQ/L (ref 95–107)
CO2: 29 MEQ/L (ref 20–31)
CREAT SERPL-MCNC: 1.04 MG/DL (ref 0.7–1.2)
EOSINOPHILS ABSOLUTE: 0.2 K/UL (ref 0–0.7)
EOSINOPHILS RELATIVE PERCENT: 4.5 %
GFR AFRICAN AMERICAN: >60
GFR NON-AFRICAN AMERICAN: >60
GLUCOSE BLD-MCNC: 117 MG/DL (ref 70–99)
HCT VFR BLD CALC: 36.8 % (ref 42–52)
HEMOGLOBIN: 12.2 G/DL (ref 14–18)
LYMPHOCYTES ABSOLUTE: 0.7 K/UL (ref 1–4.8)
LYMPHOCYTES RELATIVE PERCENT: 12.6 %
MCH RBC QN AUTO: 29.4 PG (ref 27–31.3)
MCHC RBC AUTO-ENTMCNC: 33.2 % (ref 33–37)
MCV RBC AUTO: 88.6 FL (ref 80–100)
MONOCYTES ABSOLUTE: 0.5 K/UL (ref 0.2–0.8)
MONOCYTES RELATIVE PERCENT: 9.7 %
NEUTROPHILS ABSOLUTE: 3.9 K/UL (ref 1.4–6.5)
NEUTROPHILS RELATIVE PERCENT: 73 %
PDW BLD-RTO: 15.6 % (ref 11.5–14.5)
PLATELET # BLD: 183 K/UL (ref 130–400)
POTASSIUM SERPL-SCNC: 4 MEQ/L (ref 3.4–4.9)
RBC # BLD: 4.15 M/UL (ref 4.7–6.1)
SODIUM BLD-SCNC: 143 MEQ/L (ref 135–144)
WBC # BLD: 5.4 K/UL (ref 4.8–10.8)

## 2021-01-23 PROCEDURE — 51798 US URINE CAPACITY MEASURE: CPT

## 2021-01-23 PROCEDURE — 1210000000 HC MED SURG R&B

## 2021-01-23 PROCEDURE — 36415 COLL VENOUS BLD VENIPUNCTURE: CPT

## 2021-01-23 PROCEDURE — 6360000002 HC RX W HCPCS: Performed by: INTERNAL MEDICINE

## 2021-01-23 PROCEDURE — 87324 CLOSTRIDIUM AG IA: CPT

## 2021-01-23 PROCEDURE — 2500000003 HC RX 250 WO HCPCS: Performed by: INTERNAL MEDICINE

## 2021-01-23 PROCEDURE — 87449 NOS EACH ORGANISM AG IA: CPT

## 2021-01-23 PROCEDURE — 85025 COMPLETE CBC W/AUTO DIFF WBC: CPT

## 2021-01-23 PROCEDURE — 74177 CT ABD & PELVIS W/CONTRAST: CPT

## 2021-01-23 PROCEDURE — 80048 BASIC METABOLIC PNL TOTAL CA: CPT

## 2021-01-23 PROCEDURE — 2580000003 HC RX 258: Performed by: EMERGENCY MEDICINE

## 2021-01-23 PROCEDURE — 2580000003 HC RX 258: Performed by: INTERNAL MEDICINE

## 2021-01-23 PROCEDURE — 6370000000 HC RX 637 (ALT 250 FOR IP): Performed by: INTERNAL MEDICINE

## 2021-01-23 PROCEDURE — 6360000004 HC RX CONTRAST MEDICATION: Performed by: INTERNAL MEDICINE

## 2021-01-23 RX ORDER — SODIUM CHLORIDE 9 MG/ML
INJECTION, SOLUTION INTRAVENOUS CONTINUOUS
Status: DISPENSED | OUTPATIENT
Start: 2021-01-23 | End: 2021-01-23

## 2021-01-23 RX ORDER — BISACODYL 10 MG
10 SUPPOSITORY, RECTAL RECTAL DAILY
Status: DISPENSED | OUTPATIENT
Start: 2021-01-23 | End: 2021-01-26

## 2021-01-23 RX ORDER — SENNA PLUS 8.6 MG/1
1 TABLET ORAL 2 TIMES DAILY
Status: DISCONTINUED | OUTPATIENT
Start: 2021-01-23 | End: 2021-01-25

## 2021-01-23 RX ADMIN — GABAPENTIN 100 MG: 100 CAPSULE ORAL at 14:46

## 2021-01-23 RX ADMIN — BACLOFEN 10 MG: 10 TABLET ORAL at 09:18

## 2021-01-23 RX ADMIN — SENNOSIDES 8.6 MG: 8.6 TABLET, FILM COATED ORAL at 09:18

## 2021-01-23 RX ADMIN — SENNOSIDES 8.6 MG: 8.6 TABLET, FILM COATED ORAL at 20:00

## 2021-01-23 RX ADMIN — GABAPENTIN 100 MG: 100 CAPSULE ORAL at 09:18

## 2021-01-23 RX ADMIN — MEROPENEM 1000 MG: 1 INJECTION, POWDER, FOR SOLUTION INTRAVENOUS at 12:22

## 2021-01-23 RX ADMIN — LAMOTRIGINE 200 MG: 100 TABLET ORAL at 09:18

## 2021-01-23 RX ADMIN — IOPAMIDOL 100 ML: 755 INJECTION, SOLUTION INTRAVENOUS at 12:52

## 2021-01-23 RX ADMIN — LAMOTRIGINE 200 MG: 100 TABLET ORAL at 20:00

## 2021-01-23 RX ADMIN — MEROPENEM 1000 MG: 1 INJECTION, POWDER, FOR SOLUTION INTRAVENOUS at 00:04

## 2021-01-23 RX ADMIN — DIVALPROEX SODIUM 250 MG: 250 TABLET, FILM COATED, EXTENDED RELEASE ORAL at 20:01

## 2021-01-23 RX ADMIN — SODIUM CHLORIDE: 9 INJECTION, SOLUTION INTRAVENOUS at 09:25

## 2021-01-23 RX ADMIN — BARIUM SULFATE 450 ML: 20 SUSPENSION ORAL at 09:59

## 2021-01-23 RX ADMIN — DIVALPROEX SODIUM 250 MG: 250 TABLET, FILM COATED, EXTENDED RELEASE ORAL at 09:18

## 2021-01-23 RX ADMIN — Medication 3 ML: at 01:09

## 2021-01-23 RX ADMIN — BISACODYL 10 MG: 10 SUPPOSITORY RECTAL at 09:18

## 2021-01-23 RX ADMIN — GABAPENTIN 100 MG: 100 CAPSULE ORAL at 20:00

## 2021-01-23 RX ADMIN — BUSPIRONE HYDROCHLORIDE 5 MG: 5 TABLET ORAL at 20:00

## 2021-01-23 RX ADMIN — BUSPIRONE HYDROCHLORIDE 5 MG: 5 TABLET ORAL at 09:18

## 2021-01-23 RX ADMIN — DIVALPROEX SODIUM 750 MG: 250 TABLET, FILM COATED, EXTENDED RELEASE ORAL at 20:00

## 2021-01-23 RX ADMIN — LEVOTHYROXINE SODIUM 50 MCG: 0.03 TABLET ORAL at 06:56

## 2021-01-23 NOTE — PROGRESS NOTES
Pt pm meds and assessment complete. Pt alert and oriented. Pt has iv antibiotics infusing now. Pt incontinent. Pt cleaned up an hour ago and pt did not want to turn. Was able to help pt turn without him being in too much pain. Pt watching tv at this time. Pt on telemetry. Will continue to monitor pt.   Electronically signed by Durga Santos RN on 1/23/2021 at 12:19 AM

## 2021-01-23 NOTE — H&P
Hospital Medicine  History and Physical    Patient:  Aldo Driver  MRN: [id number]    CHIEF COMPLAINT:    Chief Complaint   Patient presents with    Diarrhea       History Obtained From:  Patient, EMR  Primary Care Physician: Julio Velazco MD    HISTORY OF PRESENT ILLNESS:   The patient is a 44 y.o. male with PMH of intellectual disability. The patient was sent to the emergency room after he developed fever at the group home. He was found to have yet again another episode of UTI. Given the history of multidrug-resistant UTI the decision was made to admit him to initiate intravenous antibiotic. Patient denies any abdominal pain. No nausea or vomiting. No cough or congestion. No headaches, loss of consciousness or seizure. Past Medical History:      Diagnosis Date    Anemia     CKD (chronic kidney disease)     Diabetes mellitus ([facility] [address]     Has a tremor     Hyperlipidemia     Hypokalemia     Intellectual disability     Kidney stone     Lennox-Gastaut syndrome (HCC)     Paralytic ileus ([facility] [address]     Thyroid disease     Venous thrombosis and embolism        Past Surgical History:      Procedure Laterality Date    LITHOTRIPSY  07/30/2020       Medications Prior to Admission:    Prior to Admission medications    Medication Sig Start Date End Date Taking? Authorizing Provider   amLODIPine (NORVASC) 2.5 MG tablet Take 2.5 mg by mouth daily   Yes Historical Provider, MD   imipenem-cilastatin (PRIMAXIN) 500 MG injection Infuse 500 mg intravenously every 6 hours X 14 days   Yes Historical Provider, MD   loratadine (CLARITIN) 10 MG tablet Take 10 mg by mouth daily as needed    Yes Historical Provider, MD   diazePAM (DIASTAT) 10 MG GEL Place 10 mg rectally once as needed.    Yes Historical Provider, MD   ondansetron (ZOFRAN) 4 MG tablet Take 4 mg by mouth every 8 hours as needed for Nausea or Vomiting   Yes Historical Provider, MD   busPIRone (BUSPAR) 5 MG tablet Take 5 mg by mouth 2 times daily   Yes Historical Provider, MD   acetaminophen (TYLENOL) 325 MG tablet Take 650 mg by mouth every 4 hours as needed for Pain or Fever (pain or temp greater than 100)   Yes Historical Provider, MD   guaiFENesin (MUCINEX) 600 MG extended release tablet Take 600 mg by mouth 2 times daily as needed for Congestion For chest congestion   Yes Historical Provider, MD   calcium carbonate (TUMS EX) 750 MG chewable tablet Take 1,000 mg by mouth every 4 hours as needed for Heartburn    Yes Historical Provider, MD   gabapentin (NEURONTIN) 100 MG capsule Take 1 capsule by mouth 3 times daily for 30 days. 11/4/20 1/22/21 Yes Ranulfo Ventura MD   ferrous sulfate (IRON 325) 325 (65 Fe) MG tablet Take 1 tablet by mouth 2 times daily (with meals) 8/3/20  Yes Elroy Chang MD   baclofen (LIORESAL) 10 MG tablet Take 10 mg by mouth every morning   Yes Historical Provider, MD   divalproex (DEPAKOTE ER) 250 MG extended release tablet Take 750 mg by mouth 2 times daily    Yes Historical Provider, MD   divalproex (DEPAKOTE ER) 500 MG extended release tablet Take 500 mg by mouth nightly   Yes Historical Provider, MD   Bisacodyl (DULCOLAX RE) Place 1 Units rectally daily as needed (constipation)   Yes Historical Provider, MD   lamoTRIgine (LAMICTAL) 200 MG tablet Take 200 mg by mouth 2 times daily   Yes Historical Provider, MD   levothyroxine (SYNTHROID) 50 MCG tablet Take 50 mcg by mouth Daily   Yes Historical Provider, MD       Allergies:  Cefazolin, Celontin [methsuximide], Duricef [cefadroxil], Simvastatin, Tramadol, and Vicodin [hydrocodone-acetaminophen]    Social History:   TOBACCO:   reports that he has never smoked. He has never used smokeless tobacco.  ETOH:   reports no history of alcohol use. Family History:   History reviewed. No pertinent family history.     REVIEW OF SYSTEMS:  Ten systems reviewed and negative except for stated in HPI    Physical Exam:    Vitals: BP (!) 157/81   Pulse 110   Temp 97.8 °F (36.6 °C) (Oral)   Resp 18   Ht 5' 10\" (1.778 m)   Wt 214 lb 6.4 oz (97.3 kg)   SpO2 95%   BMI 30.76 kg/m²   General appearance: alert, appears stated age and cooperative, no apparent distress. Skin: Skin color, texture, turgor normal. No rashes or lesions  HEENT: Head: Normocephalic, no lesions, without obvious abnormality. Neck: no adenopathy, no carotid bruit, no JVD, supple, symmetrical, trachea midline and thyroid not enlarged, symmetric, no tenderness/mass/nodules  Lungs: clear to auscultation bilaterally  Heart: regular rate and rhythm, S1, S2 normal, no murmur, click, rub or gallop  Abdomen: soft, non-tender; bowel sounds normal; no masses,  no organomegaly  Extremities: extremities normal, atraumatic, no cyanosis or edema  Neurologic: Mental status: Patient has intellectual disability. Patient is able to understand basic and simple conversation such as discussion about food. Patient is able to answer yes and no questions. Patient is unable to provide comprehensive history. Patient is unable to engage in any complex conversation. Recent Labs     01/22/21  1118 01/23/21  0421   WBC 4.8 5.4   HGB 11.7* 12.2*    183     Recent Labs     01/22/21  1118 01/23/21  0421    143   K 3.7 4.0    104   CO2 25 29   BUN 12 19   CREATININE 0.81 1.04   GLUCOSE 87 117*   AST 20  --    ALT 8  --    BILITOT 0.5  --    ALKPHOS 61  --      Troponin T: No results for input(s): TROPONINI in the last 72 hours. ABGs: No results found for: PHART, PO2ART, UOI7GIW  INR: No results for input(s): INR in the last 72 hours.   URINALYSIS:  Recent Labs     01/22/21  1150   COLORU Yellow   PHUR 7.0   WBCUA >100*   RBCUA 0-2   BACTERIA MANY*   CLARITYU Cloudy   SPECGRAV 1.025   LEUKOCYTESUR Moderate   UROBILINOGEN 1.0   BILIRUBINUR Small   BLOODU Trace-intact   GLUCOSEU Negative     -----------------------------------------------------------------   Xr Abdomen (kub) (single Ap View)    Result Date: 1/22/2021  EXAMINATION: XR ABDOMEN (KUB) (SINGLE AP VIEW) CLINICAL HISTORY:  abd pain COMPARISONS:  NONE AVAILABLE TECHNIQUE:  Anterior x-ray views of the abdomen and pelvis. FINDINGS: There is retained fecal material throughout the colon and rectum worrisome for constipation. There are filled loops of colon including a large cecum with some extension diameter of 13 cm. There are dystrophic calcifications in the pelvis. No acute osseous findings. There are air-filled loops of colon including a distended cecum measuring 13 cm in greatest diameter. There is a large amount retained fecal material throughout the colon. The findings may be secondary to colonic ileus versus obstruction, Too's. Assessment and Plan     *Recurrent multidrug resistant UTI. No Jameson catheter. Intermittent straight catheter. Rule out urological abnormalities causing him to have recurrent UTI. *Chronic constipation. *Mental /intellectual disability. *Hypothyroidism    *Seizure disorder    Plan:  I had accepted to admit the patient to the medical floor. I started him on IV antibiotic. I requested the CT abdomen and pelvis to rule out urological abnormalities. Requested a PSA. I requested infectious disease to evaluate his recurrent UTI and provide further advice and recommendations. Continue other preadmission home medications.     Patient Active Problem List   Diagnosis Code    Sepsis ([address] A41.9    Small bowel obstruction ([address] K56.609    Epileptic seizure ([address] G40.909    Hypothyroidism E03.9    HTN (hypertension) I10    Moderate intellectual disabilities F71    Chronic pain G89.29    Recurrent UTI N39.0    ESBL (extended spectrum beta-lactamase) producing bacteria infection A49.9, Z16.12    Functional urinary incontinence R39.81    Gastroesophageal reflux disease without esophagitis K21.9    Iron deficiency anemia D50.9    UTI (urinary tract infection) N39.0       Pieter Cantrell MD  Admitting Hospitalist    TTS: 85mins where I focused more than 75% of my attention on rendering care, and planning treatment course for this patient, in addition to talking to RN team, mid levels, consulting with other physicians and following up on labs and imaging. High Risk Readmission Screening Tool Score Noted.      Emergency Contact:

## 2021-01-24 LAB — C DIFF TOXIN/ANTIGEN: NORMAL

## 2021-01-24 PROCEDURE — 6370000000 HC RX 637 (ALT 250 FOR IP): Performed by: INTERNAL MEDICINE

## 2021-01-24 PROCEDURE — 1210000000 HC MED SURG R&B

## 2021-01-24 PROCEDURE — 51702 INSERT TEMP BLADDER CATH: CPT

## 2021-01-24 PROCEDURE — 2580000003 HC RX 258: Performed by: EMERGENCY MEDICINE

## 2021-01-24 PROCEDURE — 2580000003 HC RX 258: Performed by: INTERNAL MEDICINE

## 2021-01-24 PROCEDURE — 6360000002 HC RX W HCPCS: Performed by: INTERNAL MEDICINE

## 2021-01-24 RX ORDER — MAGNESIUM CARB/ALUMINUM HYDROX 105-160MG
296 TABLET,CHEWABLE ORAL ONCE
Status: DISCONTINUED | OUTPATIENT
Start: 2021-01-24 | End: 2021-01-28

## 2021-01-24 RX ADMIN — GABAPENTIN 100 MG: 100 CAPSULE ORAL at 13:14

## 2021-01-24 RX ADMIN — BUSPIRONE HYDROCHLORIDE 5 MG: 5 TABLET ORAL at 19:58

## 2021-01-24 RX ADMIN — DIVALPROEX SODIUM 250 MG: 250 TABLET, FILM COATED, EXTENDED RELEASE ORAL at 13:13

## 2021-01-24 RX ADMIN — DIVALPROEX SODIUM 750 MG: 250 TABLET, FILM COATED, EXTENDED RELEASE ORAL at 19:58

## 2021-01-24 RX ADMIN — MEROPENEM 1000 MG: 1 INJECTION, POWDER, FOR SOLUTION INTRAVENOUS at 00:17

## 2021-01-24 RX ADMIN — LEVOTHYROXINE SODIUM 50 MCG: 0.03 TABLET ORAL at 06:05

## 2021-01-24 RX ADMIN — MEROPENEM 1000 MG: 1 INJECTION, POWDER, FOR SOLUTION INTRAVENOUS at 13:13

## 2021-01-24 RX ADMIN — BACLOFEN 10 MG: 10 TABLET ORAL at 13:13

## 2021-01-24 RX ADMIN — GABAPENTIN 100 MG: 100 CAPSULE ORAL at 19:58

## 2021-01-24 RX ADMIN — LAMOTRIGINE 200 MG: 100 TABLET ORAL at 19:58

## 2021-01-24 RX ADMIN — DIVALPROEX SODIUM 250 MG: 250 TABLET, FILM COATED, EXTENDED RELEASE ORAL at 20:00

## 2021-01-24 RX ADMIN — MEROPENEM 1000 MG: 1 INJECTION, POWDER, FOR SOLUTION INTRAVENOUS at 23:53

## 2021-01-24 RX ADMIN — Medication 3 ML: at 03:53

## 2021-01-24 RX ADMIN — LAMOTRIGINE 200 MG: 100 TABLET ORAL at 13:13

## 2021-01-24 RX ADMIN — SENNOSIDES 8.6 MG: 8.6 TABLET, FILM COATED ORAL at 13:14

## 2021-01-24 RX ADMIN — SENNOSIDES 8.6 MG: 8.6 TABLET, FILM COATED ORAL at 19:58

## 2021-01-24 RX ADMIN — BUSPIRONE HYDROCHLORIDE 5 MG: 5 TABLET ORAL at 13:13

## 2021-01-24 NOTE — PROGRESS NOTES
Uneventful night. No fever or chills. No chest pain or palpitation. High urinary residual.  400 range. No seizure or convulsion. General appearance: alert, appears stated age and cooperative, no apparent distress. Skin: Skin color, texture, turgor normal. No rashes or lesions  HEENT: Head: Normocephalic, no lesions, without obvious abnormality. Neck: no adenopathy, no carotid bruit, no JVD, supple, symmetrical, trachea midline and thyroid not enlarged, symmetric, no tenderness/mass/nodules  Lungs: clear to auscultation bilaterally  Heart: regular rate and rhythm, S1, S2 normal, no murmur, click, rub or gallop  Abdomen: soft, non-tender; bowel sounds normal; no masses,  no organomegaly  Extremities: extremities normal, atraumatic, no cyanosis or edema  Neurologic: Mental status: Patient has intellectual disability. Patient is able to understand basic and simple conversation such as discussion about food. Patient is able to answer yes and no questions. Patient is unable to provide comprehensive history. Patient is unable to engage in any complex conversation. Assessment and plan:     *Recurrent multidrug resistant UTI. No Jameson catheter at the group home but patient gets  intermittent straight cath twice or 3 times a day. CT scan findings were reviewed. Patient has urinary retention contributing to his recurrent infection. Continue antibiotic. Pending infectious disease consultation. Consider urological evaluation. May need a suprapubic catheter insertion. *Urinary retention, probable neurogenic bladder, patient gets straight cath at the group home few times a day. Consider urological evaluation. May need suprapubic catheter insertion.     *Chronic constipation.     *Mental /intellectual disability.     *Hypothyroidism     *Seizure disorder. Plan:  Continue antibiotic pending infectious disease input. PSA is pending. Consider urological evaluation.   Continue preadmission seizure medications. I started the patient on laxatives and stimulants. I may or may not have addressed all of this pt symptoms, medical issues, abnormal labs and findings. Pt will need additional work up, investigation, testing, surveillance, and treatment to be done at a later time and date during this hospitalization or post discharge by PCP and other out pt providers. Portion of patient care is managed by other providers. Please refer to their notes for details. I will follow specialists recommendations given their expertise in specialty subject matters. Further workup and plan will be determined based on the clinical progression and a follow-up tests result. Night and next week  hospitalist will take care of the patient in my absence.

## 2021-01-24 NOTE — PROGRESS NOTES
Pt sleeping at this time. Pt incontinent of urine and stool. Pt denies any pain or needs at this time. Pt has telemetry on and runs sinus rhythm to sinus tachy. Will continue to monitor pt.   Electronically signed by Annabella Aparicio RN on 1/23/2021 at 11:07 PM

## 2021-01-24 NOTE — PROGRESS NOTES
0900-Pt refusing to take AM medications at this time. Pt shouted \"leave me alone I don't need any medicine! \" Pt laying in bed awake and alert, he ate 100% of his breakfast and tolerated well. Will try again in a bit. 5- Pt now agreeable to take some of his AM medications. He is refusing to be turned or changed. This RN explained that it is important to be changed because patient is incontinent of stool at times. Patient yelling out \"I have a tube that drains my urine I don't need changed! \". This RN provided education on washburn catheter and importance of turning and shifting his weight, along with hygeine to prevent further breakdown of skin. Patient still refusing at this time. 1645- Pts mother at bedside. Pt now agreeable to getting cleaned up. Brief change and new zinc applied to stage 2 on buttock.

## 2021-01-24 NOTE — PROGRESS NOTES
Pt urinated and stated to this nurse he felt like he had to urinated some more and couldn't. Bladder scanned pt which had 467ml in bladder. Notified dr. Rob Landin who then called with orders. Will continue to monitor pt.   Electronically signed by Martine Jacobs RN on 1/23/2021 at 11:45 PM

## 2021-01-25 LAB
ANION GAP SERPL CALCULATED.3IONS-SCNC: 10 MEQ/L (ref 9–15)
BASOPHILS ABSOLUTE: 0 K/UL (ref 0–0.2)
BASOPHILS RELATIVE PERCENT: 0.4 %
BUN BLDV-MCNC: 13 MG/DL (ref 6–20)
CALCIUM SERPL-MCNC: 9.2 MG/DL (ref 8.5–9.9)
CHLORIDE BLD-SCNC: 104 MEQ/L (ref 95–107)
CO2: 28 MEQ/L (ref 20–31)
CREAT SERPL-MCNC: 0.77 MG/DL (ref 0.7–1.2)
EOSINOPHILS ABSOLUTE: 0.5 K/UL (ref 0–0.7)
EOSINOPHILS RELATIVE PERCENT: 10.3 %
GFR AFRICAN AMERICAN: >60
GFR NON-AFRICAN AMERICAN: >60
GLUCOSE BLD-MCNC: 94 MG/DL (ref 70–99)
HCT VFR BLD CALC: 34.9 % (ref 42–52)
HEMOGLOBIN: 11.4 G/DL (ref 14–18)
LYMPHOCYTES ABSOLUTE: 1.2 K/UL (ref 1–4.8)
LYMPHOCYTES RELATIVE PERCENT: 27.3 %
MCH RBC QN AUTO: 28.9 PG (ref 27–31.3)
MCHC RBC AUTO-ENTMCNC: 32.7 % (ref 33–37)
MCV RBC AUTO: 88.2 FL (ref 80–100)
MONOCYTES ABSOLUTE: 0.4 K/UL (ref 0.2–0.8)
MONOCYTES RELATIVE PERCENT: 9.8 %
NEUTROPHILS ABSOLUTE: 2.3 K/UL (ref 1.4–6.5)
NEUTROPHILS RELATIVE PERCENT: 52.2 %
ORGANISM: ABNORMAL
PDW BLD-RTO: 15.6 % (ref 11.5–14.5)
PLATELET # BLD: 188 K/UL (ref 130–400)
POTASSIUM SERPL-SCNC: 3.4 MEQ/L (ref 3.4–4.9)
RBC # BLD: 3.95 M/UL (ref 4.7–6.1)
SODIUM BLD-SCNC: 142 MEQ/L (ref 135–144)
URINE CULTURE, ROUTINE: ABNORMAL
WBC # BLD: 4.4 K/UL (ref 4.8–10.8)

## 2021-01-25 PROCEDURE — 6370000000 HC RX 637 (ALT 250 FOR IP): Performed by: INTERNAL MEDICINE

## 2021-01-25 PROCEDURE — 6360000002 HC RX W HCPCS: Performed by: INTERNAL MEDICINE

## 2021-01-25 PROCEDURE — 80048 BASIC METABOLIC PNL TOTAL CA: CPT

## 2021-01-25 PROCEDURE — 2580000003 HC RX 258: Performed by: INTERNAL MEDICINE

## 2021-01-25 PROCEDURE — 1210000000 HC MED SURG R&B

## 2021-01-25 PROCEDURE — 2580000003 HC RX 258: Performed by: EMERGENCY MEDICINE

## 2021-01-25 PROCEDURE — 85025 COMPLETE CBC W/AUTO DIFF WBC: CPT

## 2021-01-25 PROCEDURE — 36415 COLL VENOUS BLD VENIPUNCTURE: CPT

## 2021-01-25 PROCEDURE — 99222 1ST HOSP IP/OBS MODERATE 55: CPT | Performed by: INTERNAL MEDICINE

## 2021-01-25 RX ORDER — LACTULOSE 10 G/15ML
20 SOLUTION ORAL 2 TIMES DAILY
Status: DISCONTINUED | OUTPATIENT
Start: 2021-01-25 | End: 2021-01-28 | Stop reason: HOSPADM

## 2021-01-25 RX ORDER — SODIUM CHLORIDE 9 MG/ML
250 INJECTION, SOLUTION INTRAVENOUS ONCE
Status: DISCONTINUED | OUTPATIENT
Start: 2021-01-25 | End: 2021-01-28 | Stop reason: HOSPADM

## 2021-01-25 RX ORDER — LIDOCAINE HYDROCHLORIDE 20 MG/ML
5 INJECTION, SOLUTION EPIDURAL; INFILTRATION; INTRACAUDAL; PERINEURAL ONCE
Status: DISCONTINUED | OUTPATIENT
Start: 2021-01-25 | End: 2021-01-28 | Stop reason: HOSPADM

## 2021-01-25 RX ORDER — SODIUM CHLORIDE 0.9 % (FLUSH) 0.9 %
10 SYRINGE (ML) INJECTION PRN
Status: DISCONTINUED | OUTPATIENT
Start: 2021-01-25 | End: 2021-01-28 | Stop reason: HOSPADM

## 2021-01-25 RX ORDER — POLYETHYLENE GLYCOL 3350 17 G/17G
17 POWDER, FOR SOLUTION ORAL 2 TIMES DAILY
Status: DISCONTINUED | OUTPATIENT
Start: 2021-01-25 | End: 2021-01-28 | Stop reason: HOSPADM

## 2021-01-25 RX ORDER — TAMSULOSIN HYDROCHLORIDE 0.4 MG/1
0.4 CAPSULE ORAL DAILY
Status: DISCONTINUED | OUTPATIENT
Start: 2021-01-25 | End: 2021-01-28 | Stop reason: HOSPADM

## 2021-01-25 RX ORDER — POTASSIUM CHLORIDE 750 MG/1
40 TABLET, EXTENDED RELEASE ORAL ONCE
Status: COMPLETED | OUTPATIENT
Start: 2021-01-25 | End: 2021-01-25

## 2021-01-25 RX ORDER — SODIUM CHLORIDE 0.9 % (FLUSH) 0.9 %
10 SYRINGE (ML) INJECTION EVERY 12 HOURS SCHEDULED
Status: DISCONTINUED | OUTPATIENT
Start: 2021-01-25 | End: 2021-01-28 | Stop reason: HOSPADM

## 2021-01-25 RX ORDER — SENNA PLUS 8.6 MG/1
2 TABLET ORAL 2 TIMES DAILY
Status: DISCONTINUED | OUTPATIENT
Start: 2021-01-25 | End: 2021-01-28 | Stop reason: HOSPADM

## 2021-01-25 RX ORDER — FINASTERIDE 5 MG/1
5 TABLET, FILM COATED ORAL DAILY
Status: DISCONTINUED | OUTPATIENT
Start: 2021-01-25 | End: 2021-01-28 | Stop reason: HOSPADM

## 2021-01-25 RX ADMIN — GABAPENTIN 100 MG: 100 CAPSULE ORAL at 20:59

## 2021-01-25 RX ADMIN — MEROPENEM 1000 MG: 1 INJECTION, POWDER, FOR SOLUTION INTRAVENOUS at 12:38

## 2021-01-25 RX ADMIN — BUSPIRONE HYDROCHLORIDE 5 MG: 5 TABLET ORAL at 20:59

## 2021-01-25 RX ADMIN — SENNOSIDES 17.2 MG: 8.6 TABLET, FILM COATED ORAL at 20:59

## 2021-01-25 RX ADMIN — MEROPENEM 1000 MG: 1 INJECTION, POWDER, FOR SOLUTION INTRAVENOUS at 21:21

## 2021-01-25 RX ADMIN — BUSPIRONE HYDROCHLORIDE 5 MG: 5 TABLET ORAL at 08:52

## 2021-01-25 RX ADMIN — POTASSIUM CHLORIDE 40 MEQ: 750 TABLET, EXTENDED RELEASE ORAL at 08:54

## 2021-01-25 RX ADMIN — DIVALPROEX SODIUM 250 MG: 250 TABLET, FILM COATED, EXTENDED RELEASE ORAL at 08:52

## 2021-01-25 RX ADMIN — Medication 3 ML: at 12:39

## 2021-01-25 RX ADMIN — LAMOTRIGINE 200 MG: 100 TABLET ORAL at 08:52

## 2021-01-25 RX ADMIN — TAMSULOSIN HYDROCHLORIDE 0.4 MG: 0.4 CAPSULE ORAL at 21:06

## 2021-01-25 RX ADMIN — LAMOTRIGINE 200 MG: 100 TABLET ORAL at 21:00

## 2021-01-25 RX ADMIN — LACTULOSE 20 G: 20 SOLUTION ORAL at 21:02

## 2021-01-25 RX ADMIN — GABAPENTIN 100 MG: 100 CAPSULE ORAL at 08:52

## 2021-01-25 RX ADMIN — Medication 10 ML: at 21:00

## 2021-01-25 RX ADMIN — DIVALPROEX SODIUM 750 MG: 250 TABLET, FILM COATED, EXTENDED RELEASE ORAL at 20:59

## 2021-01-25 RX ADMIN — BACLOFEN 10 MG: 10 TABLET ORAL at 08:52

## 2021-01-25 RX ADMIN — Medication 3 ML: at 00:27

## 2021-01-25 RX ADMIN — POLYETHYLENE GLYCOL 3350 17 G: 17 POWDER, FOR SOLUTION ORAL at 21:03

## 2021-01-25 RX ADMIN — SENNOSIDES 17.2 MG: 8.6 TABLET, FILM COATED ORAL at 08:54

## 2021-01-25 RX ADMIN — Medication 3 ML: at 21:01

## 2021-01-25 RX ADMIN — FINASTERIDE 5 MG: 5 TABLET, FILM COATED ORAL at 21:06

## 2021-01-25 ASSESSMENT — PAIN SCALES - GENERAL: PAINLEVEL_OUTOF10: 0

## 2021-01-25 NOTE — PROGRESS NOTES
Pt refused to take am meds. Pt also refused to turn to allow this nurse and pca to check to see if he needs changed from incontinence. Pt stated \"Shut up about that\". Pt refused to turn to change pt. Pt stated \"I just want to sleep\". Will continue to monitor pt.   Electronically signed by Subha Pugh RN on 1/25/2021 at 5:25 AM

## 2021-01-25 NOTE — PROGRESS NOTES
Pt called stating he needed changed for stool incontinence. Pt was very tearful stating he wants to go home to see his girlfriend. Pt cleaned up and had loose stool. Pt bottom red and has an open area. Zinc paste applied to sacrum.    Electronically signed by Tobin Cruz RN on 1/25/2021 at 7:44 AM

## 2021-01-25 NOTE — CONSULTS
Infectious Diseases Inpatient Consult Note      Reason for Consult:   ESBL Klebsiella UTI  Requesting Physician:   Dr. Talia Cornejo  Primary Care Physician:  Ariane Ponce MD  History Obtained From:   Pt, EPIC    Admit Date: 1/22/2021  Hospital Day: 4      HISTORY OF PRESENT ILLNESS:    Patient was informed that this is a billable visit. I am in my office, patient is at [facility]. [employer]. me/ [employer] was used for communication and exam. Chart was reviewed and labs/ X Rays were discussed with the patient. our practice is making every effort to adhere to current recommendations, including social distancing. For the health and safety of our patients, and to prevent unnecessary exposure, we are currently scheduling telephone appointments. Patient was informed that these appointments are official appointments and will be billed through patient's insurance . Patient confirms this and agreed to the televised visit. Time spend in review of chart and on the IPAD using Doxy. me was 45  minutes. This is a 44 y.o. male was admitted to [facility],  from group home  through ER with acute onset of fevers. Patient was treated for multidrug-resistant UTI last month. Was admitted and was started on IV meropenem which well-tolerated. Patient was evaluated for possible C. difficile with negative results, was found to have constipation. Past medical history significant for kidney stones and recurrent UTI and neurogenic bladder with intermittent catheterization. Has Lennox- Gastaut sx.   CHIEF COMPLAINT:       Past Medical History:   Diagnosis Date    Anemia     CKD (chronic kidney disease)     Diabetes mellitus ([facility] [address]     Has a tremor     Hyperlipidemia     Hypokalemia     Intellectual disability     Kidney stone     Lennox-Gastaut syndrome (HCC)     Paralytic ileus (HCC)     Thyroid disease     Venous thrombosis and embolism        Past Surgical History:   Procedure Laterality Date    LITHOTRIPSY Other Topics Concern    Not on file   Social History Narrative    Not on file         Family History:   History reviewed. No pertinent family history. Review of Systems  Limited 14 system review is negative other than HPI, anxious to be discharged to see his girlfriend    Physical Exam  Vitals:    01/23/21 2000 01/24/21 0015 01/24/21 0524 01/24/21 0536   BP: 121/79 (!) 143/76 135/81 135/81   Pulse: 90  78 79   Resp:   18    Temp:   98 °F (36.7 °C) 98.1 °F (36.7 °C)   TempSrc:   Axillary    SpO2:   100%    Weight:       Height:         General Appearance: alert and oriented to self, knew that he was in the hospital but unsure what hospital, well-developed and well-nourished, in no acute distress, on room air  Skin: warm anddry, no rash. Head: normocephalic and atraumatic  Eyes: anicteric sclerae  ENT:  normal mucous membranes. Lungs: normal respiratory effort  Abdomen:  no distention  NEUROLOGICAL: alert and oriented x 1.5, no focal deficits  No leg edema  No erythema  Dark yellow urine in [place]        DATA:    Lab Results   Component Value Date    WBC 4.4 (L) 01/25/2021    HGB 11.4 (L) 01/25/2021    HCT 34.9 (L) 01/25/2021    MCV 88.2 01/25/2021     01/25/2021     Lab Results   Component Value Date    CREATININE 0.77 01/25/2021    BUN 13 01/25/2021     01/25/2021    K 3.4 01/25/2021     01/25/2021    CO2 28 01/25/2021       Hepatic Function Panel:   Lab Results   Component Value Date    ALKPHOS 61 01/22/2021    ALT 8 01/22/2021    AST 20 01/22/2021    PROT 6.8 01/22/2021    BILITOT 0.5 01/22/2021    LABALBU 4.1 01/22/2021           Imaging:   CT of abdomen and pelvis      Impression       MILDLY WORSENING MODERATE URINARY BLADDER DISTENTION AND WALL THICKENING.       MILD UPPER URINARY TRACT DILATATION AND SURROUNDING INFLAMMATION SUGGESTIVE OF ASCENDING INFECTION.       NO ABSCESS OR OTHER COMPLICATION IDENTIFIED.       CONSTIPATION.          IMPRESSION:    · ESBL Klebsiella UTI,

## 2021-01-25 NOTE — PROGRESS NOTES
Chart reviewed. Patient was admitted to [facility] with UTI. Patient was seen by ID via telehealth on this date. ID recommending IV atb Q 8 hrs for 10-14 days. [facility] RN [facility] reports that patient is scheduled for PICC Line at [facility] on Wednesday 1/27/21 at 1pm.  Patient is a resident of [address] [place] [address] and [facility] is unable to administer IV antibioitcs. Patient is unable to make own health care decisions due to limited cognition. Patient's mother is legal guardian and primary decision maker. This  met with patient and mother to discuss DC planning and IV antibiotic needs. Patient's mother reports that patient was recently at [facility] for IV antibiotics. Patient reports desire to go back to The [address] [place] [address] for IV antibiotics. Patient's mother agreeable with plan for patient to go to The [address] [place] [address] SNF after PICC Line placement. It is worth noting that patient's insurance requires a 3 midnight qualifying inpatient stay before patient is eligible to receive inpatient skilled care. Patient was admitted to [facility] on 1/22/21. This  contacted [facility] in admissions at [facility] with new referral.  Information faxed for review. [facility] plans to review patient's information and will notify this  if a bed will be available or not this Thursday 1/28.   SS to continue to follow

## 2021-01-25 NOTE — PROGRESS NOTES
01/23/21  0421 01/25/21  0531   WBC 4.8 5.4 4.4*   HGB 11.7* 12.2* 11.4*   HCT 36.0* 36.8* 34.9*    183 188     Recent Labs     01/22/21  1118 01/23/21  0421 01/25/21  0531    143 142   K 3.7 4.0 3.4    104 104   CO2 25 29 28   BUN 12 19 13   CREATININE 0.81 1.04 0.77   CALCIUM 9.8 9.7 9.2     Recent Labs     01/22/21  1118   AST 20   ALT 8   BILITOT 0.5   ALKPHOS 61     No results for input(s): INR in the last 72 hours. No results for input(s): [place] in the last 72 hours. Urinalysis:      Lab Results   Component Value Date    NITRU Negative 01/22/2021    WBCUA >100 01/22/2021    BACTERIA MANY 01/22/2021    RBCUA 0-2 01/22/2021    BLOODU Trace-intact 01/22/2021    SPECGRAV 1.025 01/22/2021    GLUCOSEU Negative 01/22/2021       Radiology:  CT ABDOMEN PELVIS W IV CONTRAST Additional Contrast? Oral   Final Result      MILDLY WORSENING MODERATE URINARY BLADDER DISTENTION AND WALL THICKENING. MILD UPPER URINARY TRACT DILATATION AND SURROUNDING INFLAMMATION SUGGESTIVE OF ASCENDING INFECTION. NO ABSCESS OR OTHER COMPLICATION IDENTIFIED. CONSTIPATION. XR ABDOMEN (KUB) (SINGLE AP VIEW)   Final Result   There are air-filled loops of colon including a distended cecum measuring 13 cm in greatest diameter. There is a large amount retained fecal material throughout the colon. The findings may be secondary to colonic ileus versus obstruction,    Moapa's. Assessment/Plan:    Active Hospital Problems    Diagnosis Date Noted    UTI (urinary tract infection) [N39.0] 01/22/2021         DVT Prophylaxis:   Diet: DIET GENERAL;  Code Status: Prior    PT/OT Eval Status:     Dispo - UTI/fever- UC report pending, previous multidrug resistant klebsiella- continue with meropenem for now, ID on case   Acute/chronic urinary retention- washburn in place.  Will follow up with urology service   Constipation- add lactulose, follow up clinically  MRDD- supportive care, group home resident    Hypokalemia- replace   Medically stable for acute admission at [place]     Electronically signed by Siva Partida MD on 1/25/2021 at 8:05 AM

## 2021-01-25 NOTE — PROGRESS NOTES
----- Message from Brie Neves sent at 2/23/2017  8:16 AM CST -----  Contact: pt  Refill on naproxen (NAPROSYN) 500 MG tablet ( only two pills left)  Call back on # [phone]  thanks      [id number] - [place] - [address] [address] [facility] [address] [address] [facility]  [place] [address]  Phone: [phone] Fax: [phone]     I reviewed the patient's chart and current Urologic care is provided at [facility]. I discussed my recommendations with Dr Mitchell Camejo as well. This is a 45 yo male with h/o CKD, DM, Lennox-Gastaut syndrome, and intellectual disability lives at a group home and was admitted for UTI. He has h/o recurrent UTI's and neurogenic bladder managed with CIC as per [facility] notes. According to Dr Mitchell Camejo the Jo[place]hester may not be happening as frequently as recommended and has h/o incomplete bladder emptying. He is currently clinically stable and I recommend starting him on Flomax and Proscar given his age and possible BPH as a contributing factor as this may help maximize his spontaneous bladder emptying. If CIC can be used at least 4 times daily to help maximize emptying, this is the best option. Otherwise a chronic washburn (changed every 4 weeks) or SP tube could be considered at [facility]. The CT shows no upper tract obstruction. No plans for any current Urologic intervention and should F/U with est Urologist at [facility], Dr Way Him.

## 2021-01-25 NOTE — PROGRESS NOTES
Pt watching tv at this time. Pt denies any pain or needs at this time. Pt refuses to turn. Pt has a washburn cath in place. Will continue to monitor pt and try to turn him.    Electronically signed by Aydee Marcus RN on 1/24/2021 at 11:13 PM

## 2021-01-26 PROCEDURE — 6360000002 HC RX W HCPCS: Performed by: INTERNAL MEDICINE

## 2021-01-26 PROCEDURE — 6370000000 HC RX 637 (ALT 250 FOR IP): Performed by: INTERNAL MEDICINE

## 2021-01-26 PROCEDURE — 2580000003 HC RX 258: Performed by: EMERGENCY MEDICINE

## 2021-01-26 PROCEDURE — 2580000003 HC RX 258: Performed by: INTERNAL MEDICINE

## 2021-01-26 PROCEDURE — 99212 OFFICE O/P EST SF 10 MIN: CPT

## 2021-01-26 PROCEDURE — 1210000000 HC MED SURG R&B

## 2021-01-26 RX ADMIN — Medication 3 ML: at 23:01

## 2021-01-26 RX ADMIN — BACLOFEN 10 MG: 10 TABLET ORAL at 08:42

## 2021-01-26 RX ADMIN — MEROPENEM 1000 MG: 1 INJECTION, POWDER, FOR SOLUTION INTRAVENOUS at 06:15

## 2021-01-26 RX ADMIN — LAMOTRIGINE 200 MG: 100 TABLET ORAL at 08:42

## 2021-01-26 RX ADMIN — SENNOSIDES 17.2 MG: 8.6 TABLET, FILM COATED ORAL at 08:42

## 2021-01-26 RX ADMIN — BUSPIRONE HYDROCHLORIDE 5 MG: 5 TABLET ORAL at 08:42

## 2021-01-26 RX ADMIN — TAMSULOSIN HYDROCHLORIDE 0.4 MG: 0.4 CAPSULE ORAL at 08:42

## 2021-01-26 RX ADMIN — Medication 10 ML: at 19:52

## 2021-01-26 RX ADMIN — FINASTERIDE 5 MG: 5 TABLET, FILM COATED ORAL at 08:42

## 2021-01-26 RX ADMIN — GABAPENTIN 100 MG: 100 CAPSULE ORAL at 13:28

## 2021-01-26 RX ADMIN — MEROPENEM 1000 MG: 1 INJECTION, POWDER, FOR SOLUTION INTRAVENOUS at 23:00

## 2021-01-26 RX ADMIN — GABAPENTIN 100 MG: 100 CAPSULE ORAL at 08:42

## 2021-01-26 RX ADMIN — MEROPENEM 1000 MG: 1 INJECTION, POWDER, FOR SOLUTION INTRAVENOUS at 13:28

## 2021-01-26 RX ADMIN — Medication 3 ML: at 06:16

## 2021-01-26 RX ADMIN — Medication 10 ML: at 13:28

## 2021-01-26 RX ADMIN — GABAPENTIN 100 MG: 100 CAPSULE ORAL at 19:52

## 2021-01-26 RX ADMIN — LACTULOSE 20 G: 20 SOLUTION ORAL at 08:42

## 2021-01-26 RX ADMIN — LEVOTHYROXINE SODIUM 50 MCG: 0.03 TABLET ORAL at 06:15

## 2021-01-26 RX ADMIN — LAMOTRIGINE 200 MG: 100 TABLET ORAL at 19:52

## 2021-01-26 RX ADMIN — BUSPIRONE HYDROCHLORIDE 5 MG: 5 TABLET ORAL at 19:52

## 2021-01-26 RX ADMIN — DIVALPROEX SODIUM 750 MG: 250 TABLET, FILM COATED, EXTENDED RELEASE ORAL at 19:51

## 2021-01-26 RX ADMIN — POLYETHYLENE GLYCOL 3350 17 G: 17 POWDER, FOR SOLUTION ORAL at 08:45

## 2021-01-26 RX ADMIN — DIVALPROEX SODIUM 250 MG: 250 TABLET, FILM COATED, EXTENDED RELEASE ORAL at 08:42

## 2021-01-26 NOTE — PROGRESS NOTES
Wound Ostomy Continence Nurse  Consult Note       NAME:  Dori Otoole  MEDICAL RECORD NUMBER:  [id number]  AGE: 44 y.o. GENDER: male  : 1970  TODAY'S DATE:  2021    Subjective   Reason for [id number] Nurse Evaluation and Assessment: Stage 2 buttocks      Altagracia Ott is a 44 y.o. male referred by:   [x] Physician  [] Nursing  [] Other:     Wound Identification:  Wound Type: pressure  Contributing Factors: diabetes, chronic pressure, decreased mobility, shear force, incontinence of stool and incontinence of urine    Wound History: Patient admitted to [facility] with stage 2 pressure injury to the buttocks. Patient refusing assessment, even after educating on importance of wound care to prevent pressure injury from worsening. Patient then adamantly yelled \"you shut the hell up and get out of my god damn room. \" refusing assessment. Will place wound care orders per Skin care prolotol  Current Wound Care Treatment:  Recommendin) Pressure injury prevention interventions 2) Protective barrier cream for wound care and moisture management. Patient Goal of Care:  [x] Wound Healing  [] Odor Control  [] Palliative Care  [] Pain Control   [] Other:         PAST MEDICAL HISTORY        Diagnosis Date    Anemia     CKD (chronic kidney disease)     Diabetes mellitus ([address]     Has a tremor     Hyperlipidemia     Hypokalemia     Intellectual disability     Kidney stone     Lennox-Gastaut syndrome (HCC)     Paralytic ileus ([facility] [address]     Thyroid disease     Venous thrombosis and embolism        PAST SURGICAL HISTORY    Past Surgical History:   Procedure Laterality Date    LITHOTRIPSY  2020       FAMILY HISTORY    History reviewed. No pertinent family history.     SOCIAL HISTORY    Social History     Tobacco Use    Smoking status: Never Smoker    Smokeless tobacco: Never Used   Substance Use Topics    Alcohol use: Never     Frequency: Never    Drug use: Never ALLERGIES    Allergies   Allergen Reactions    Cefazolin     Celontin [Methsuximide]     Duricef [Cefadroxil]     Simvastatin     Tramadol Other (See Comments)     Other reaction(s): Other: See Comments  \"causes seizures\"  Causes seizures      Vicodin [Hydrocodone-Acetaminophen]        MEDICATIONS    No current facility-administered medications on file prior to encounter. Current Outpatient Medications on File Prior to Encounter   Medication Sig Dispense Refill    amLODIPine (NORVASC) 2.5 MG tablet Take 2.5 mg by mouth daily      imipenem-cilastatin (PRIMAXIN) 500 MG injection Infuse 500 mg intravenously every 6 hours X 14 days      loratadine (CLARITIN) 10 MG tablet Take 10 mg by mouth daily as needed       diazePAM (DIASTAT) 10 MG GEL Place 10 mg rectally once as needed.  ondansetron (ZOFRAN) 4 MG tablet Take 4 mg by mouth every 8 hours as needed for Nausea or Vomiting      busPIRone (BUSPAR) 5 MG tablet Take 5 mg by mouth 2 times daily      acetaminophen (TYLENOL) 325 MG tablet Take 650 mg by mouth every 4 hours as needed for Pain or Fever (pain or temp greater than 100)      guaiFENesin (MUCINEX) 600 MG extended release tablet Take 600 mg by mouth 2 times daily as needed for Congestion For chest congestion      calcium carbonate (TUMS EX) 750 MG chewable tablet Take 1,000 mg by mouth every 4 hours as needed for Heartburn       gabapentin (NEURONTIN) 100 MG capsule Take 1 capsule by mouth 3 times daily for 30 days.  90 capsule 2    ferrous sulfate (IRON 325) 325 (65 Fe) MG tablet Take 1 tablet by mouth 2 times daily (with meals) 30 tablet 3    baclofen (LIORESAL) 10 MG tablet Take 10 mg by mouth every morning      divalproex (DEPAKOTE ER) 250 MG extended release tablet Take 750 mg by mouth 2 times daily       divalproex (DEPAKOTE ER) 500 MG extended release tablet Take 500 mg by mouth nightly      Bisacodyl (DULCOLAX RE) Place 1 Units rectally daily as needed (constipation) Pressure Redistribution  [] Fluid Immersion  [] Bariatric  [] Other:     Current Diet: DIET GENERAL;  Dietician consult:  Yes    Discharge Plan:  Placement for patient upon discharge: skilled nursing    Patient appropriate for Outpatient [address]: N/A    Referrals:  []   [] [facility]  [] Supplies  [] Other    Patient/Caregiver Teaching:  Level of patient/caregiver understanding able to:   [] Indicates understanding       [] Needs reinforcement  [] Unsuccessful      [] Verbal Understanding  [] Demonstrated understanding       [] No evidence of learning  [] Refused teaching         [x] N/A       Electronically signed by LUCAS GuillermoN, RN, CWOCN on 1/26/2021 at 2:19 PM

## 2021-01-27 ENCOUNTER — HOSPITAL ENCOUNTER (OUTPATIENT)
Dept: INTERVENTIONAL RADIOLOGY/VASCULAR | Age: 51
Discharge: HOME | End: 2021-01-29
Payer: MEDICARE

## 2021-01-27 LAB — SARS-COV-2, NAAT: NOT DETECTED

## 2021-01-27 PROCEDURE — 6360000002 HC RX W HCPCS: Performed by: INTERNAL MEDICINE

## 2021-01-27 PROCEDURE — 2500000003 HC RX 250 WO HCPCS: Performed by: INTERNAL MEDICINE

## 2021-01-27 PROCEDURE — U0002 COVID-19 LAB TEST NON-CDC: HCPCS

## 2021-01-27 PROCEDURE — 6370000000 HC RX 637 (ALT 250 FOR IP): Performed by: INTERNAL MEDICINE

## 2021-01-27 PROCEDURE — 2709999900 IR PICC WO SQ PORT/PUMP > 5 YEARS

## 2021-01-27 PROCEDURE — 2580000003 HC RX 258: Performed by: EMERGENCY MEDICINE

## 2021-01-27 PROCEDURE — 2580000003 HC RX 258: Performed by: INTERNAL MEDICINE

## 2021-01-27 PROCEDURE — 02HV33Z INSERTION OF INFUSION DEVICE INTO SUPERIOR VENA CAVA, PERCUTANEOUS APPROACH: ICD-10-PCS | Performed by: INTERNAL MEDICINE

## 2021-01-27 PROCEDURE — 1210000000 HC MED SURG R&B

## 2021-01-27 PROCEDURE — 36573 INSJ PICC RS&I 5 YR+: CPT | Performed by: RADIOLOGY

## 2021-01-27 RX ORDER — SODIUM CHLORIDE 0.9 % (FLUSH) 0.9 %
10 SYRINGE (ML) INJECTION EVERY 12 HOURS SCHEDULED
Status: DISCONTINUED | OUTPATIENT
Start: 2021-01-27 | End: 2021-01-30 | Stop reason: HOSPADM

## 2021-01-27 RX ORDER — SODIUM CHLORIDE 0.9 % (FLUSH) 0.9 %
10 SYRINGE (ML) INJECTION PRN
Status: DISCONTINUED | OUTPATIENT
Start: 2021-01-27 | End: 2021-01-30 | Stop reason: HOSPADM

## 2021-01-27 RX ORDER — SODIUM CHLORIDE 9 MG/ML
250 INJECTION, SOLUTION INTRAVENOUS ONCE
Status: COMPLETED | OUTPATIENT
Start: 2021-01-27 | End: 2021-01-27

## 2021-01-27 RX ORDER — LIDOCAINE HYDROCHLORIDE 20 MG/ML
5 INJECTION, SOLUTION INFILTRATION; PERINEURAL ONCE
Status: COMPLETED | OUTPATIENT
Start: 2021-01-27 | End: 2021-01-27

## 2021-01-27 RX ADMIN — LAMOTRIGINE 200 MG: 100 TABLET ORAL at 21:51

## 2021-01-27 RX ADMIN — LAMOTRIGINE 200 MG: 100 TABLET ORAL at 12:07

## 2021-01-27 RX ADMIN — MEROPENEM 1000 MG: 1 INJECTION, POWDER, FOR SOLUTION INTRAVENOUS at 21:53

## 2021-01-27 RX ADMIN — Medication 3 ML: at 05:21

## 2021-01-27 RX ADMIN — DIVALPROEX SODIUM 250 MG: 250 TABLET, FILM COATED, EXTENDED RELEASE ORAL at 12:07

## 2021-01-27 RX ADMIN — Medication 3 ML: at 18:21

## 2021-01-27 RX ADMIN — MEROPENEM 1000 MG: 1 INJECTION, POWDER, FOR SOLUTION INTRAVENOUS at 15:09

## 2021-01-27 RX ADMIN — DIVALPROEX SODIUM 750 MG: 250 TABLET, FILM COATED, EXTENDED RELEASE ORAL at 21:58

## 2021-01-27 RX ADMIN — MEROPENEM 1000 MG: 1 INJECTION, POWDER, FOR SOLUTION INTRAVENOUS at 05:20

## 2021-01-27 RX ADMIN — LEVOTHYROXINE SODIUM 50 MCG: 0.03 TABLET ORAL at 05:21

## 2021-01-27 RX ADMIN — LIDOCAINE HYDROCHLORIDE 5 ML: 20 INJECTION, SOLUTION INFILTRATION; PERINEURAL at 14:13

## 2021-01-27 RX ADMIN — DIVALPROEX SODIUM 250 MG: 250 TABLET, FILM COATED, EXTENDED RELEASE ORAL at 21:50

## 2021-01-27 RX ADMIN — Medication 10 ML: at 21:21

## 2021-01-27 RX ADMIN — BUSPIRONE HYDROCHLORIDE 5 MG: 5 TABLET ORAL at 12:07

## 2021-01-27 RX ADMIN — GABAPENTIN 100 MG: 100 CAPSULE ORAL at 21:59

## 2021-01-27 RX ADMIN — GABAPENTIN 100 MG: 100 CAPSULE ORAL at 15:09

## 2021-01-27 RX ADMIN — BUSPIRONE HYDROCHLORIDE 5 MG: 5 TABLET ORAL at 21:59

## 2021-01-27 RX ADMIN — SODIUM CHLORIDE 250 ML: 9 INJECTION, SOLUTION INTRAVENOUS at 14:12

## 2021-01-27 ASSESSMENT — PAIN SCALES - GENERAL
PAINLEVEL_OUTOF10: 0

## 2021-01-27 ASSESSMENT — PAIN SCALES - WONG BAKER: WONGBAKER_NUMERICALRESPONSE: 0

## 2021-01-27 NOTE — PROGRESS NOTES
This  received phone call from Owen Parker in admissions at [facility] in [place]. Oewn Candelariau reports that the [facility] has accepted patient and patient is able to admit on Thursday 1/28/21. Patient will need a negative COVID and 0700 prior to admission. This  will complete 0700 and SANDRO. This  notified [address] whom plans to follow up with hospitalist regarding rapid COVID order. This  collaborated with unit coordinator Cindi whom plans to assist in arranging transportation.   SS to continue to follow

## 2021-01-27 NOTE — PROGRESS NOTES
Nutrition Assessment     Type and Reason for Visit: Positive Nutrition Screen    Nutrition Recommendations/Plan:     Nutrition Assessment:  Shows ~ 17% weight loss over 1 year, not significant. Refused breakfast & lunch, but when he returned from picc placement ate 2 omelets and is now asking for snacks. Discussed with staff monitor glucose, as he is drinking regular soda now. May need to add carbohydrate restriction to diet if he returns to eating 3 times a day. Is a very picky eater, refuses most menu items offered. His mom is providing some meals. POC is to d/c to SNF in next 24-48 hrs. Will trial glucerna at lunch. Malnutrition Assessment:  Malnutrition Status: At risk for Malnutrition      Current Nutrition Therapies:    DIET GENERAL;     Anthropometric Measures:  · Height: 5' 10\" (177.8 cm)  · Current Body Wt: 214 lb 8.1 oz (97.3 kg)(will ask for reweight.)   · BMI: 30.8    Nutrition Interventions:   Food and/or Nutrient Delivery:  Continue Current Diet, Start Oral Nutrition Supplement  Nutrition Education/Counseling:  No recommendation at this time   Coordination of Nutrition Care:  Continue to monitor while inpatient    Electronically signed by Gwendolyn Lowe RD, LD on 1/27/21 at 5:33 PM EST

## 2021-01-27 NOTE — DISCHARGE INSTR - COC
Continuity of Care Form    Patient Name: Jude Horta   :  1970  MRN:  [id number]    Admit date:  2021  Discharge date:  ***    Code Status Order: Prior   Advance Directives:   [facility] Documentation       Date/Time Healthcare Directive Type of Healthcare Directive Copy in [address] Agent's Name Healthcare Agent's Phone Number    21 0728  Yes, patient has an advance directive for healthcare treatment  --  --  --  --  --            Admitting Physician:  Silvia Pabon MD  PCP: Dolph Peabody, MD    Discharging Nurse: [facility] Unit/Room#: 0206/0206-01  Discharging Unit Phone Number: ***    Emergency Contact:   Extended Emergency Contact Information  Primary Emergency Contact: Lam Pass  Address: [address] [place] [address] [address] Phone: [phone]  Relation: Parent    Past Surgical History:  Past Surgical History:   Procedure Laterality Date    LITHOTRIPSY  2020       Immunization History: There is no immunization history on file for this patient.     Active Problems:  Patient Active Problem List   Diagnosis Code    Sepsis ([address] A41.9    Small bowel obstruction ([address] K56.609    Epileptic seizure ([address] G40.909    Hypothyroidism E03.9    HTN (hypertension) I10    Moderate intellectual disabilities F71    Chronic pain G89.29    Recurrent UTI N39.0    ESBL (extended spectrum beta-lactamase) producing bacteria infection A49.9, Z16.12    Functional urinary incontinence R39.81    Gastroesophageal reflux disease without esophagitis K21.9    Iron deficiency anemia D50.9    UTI (urinary tract infection) N39.0       Isolation/Infection:   Isolation            Contact          Patient Infection Status       Infection Onset Added Last Indicated Last Indicated By Review Planned Expiration Resolved Resolved By    ESBL (Extended Spectrum Beta Lactamase) 20 Culture, Urine        Klebsiella pneumoniae + ESBL urine 2021. Electronically signed by Maribel Ashby RN on 21 at 7:57 AM EST     Klebsiella pneumoniae + ESBL of urine 20. Electronically signed by Maribel Ashby RN on 2020 at 10:10 AM     Klebsiella pneumoniae ESBL of blood and urine ... Electronically signed by Maribel Ashby RN on 20 at 6:28 PM EDT       MDRO (multi-drug resistant organism) 20 Culture, Urine        Klebsiella pneumoniae ESBL of blood and urine ... Electronically signed by Maribel Ashby RN on 20 at 6:28 PM EDT      Resolved    C-diff Rule Out 21 Clostridium difficile toxin/antigen (Ordered)   21 Rule-Out Test Resulted    COVID-19 Rule Out 21 COVID-19 (Ordered)   21 Rule-Out Test Resulted    C-diff Rule Out 20 Gastrointestinal Panel, Molecular (Ordered)   20 Rule-Out Test Resulted    C-diff Rule Out 20 Clostridium Difficile Toxin/Antigen (Ordered)   20 Rule-Out Test Resulted    COVID-19 Rule Out 20 COVID-19 (Ordered)   20 Rule-Out Test Resulted    COVID-19 Rule Out 20 COVID-19 (Ordered)   20     COVID-19 Rule Out 20 COVID-19 (Ordered)   20 Rule-Out Test Resulted    COVID-19 Rule Out 20 COVID-19 (Ordered)   20 Rule-Out Test Resulted            Nurse Assessment:  Last Vital Signs: /79   Pulse 88   Temp 98.4 °F (36.9 °C)   Resp 18   Ht 5' 10\" (1.778 m)   Wt 214 lb 6.4 oz (97.3 kg)   SpO2 100%   BMI 30.76 kg/m²     Last documented pain score (0-10 scale): Pain Level: 0  Last Weight:   Wt Readings from Last 1 Encounters:   21 214 lb 6.4 oz (97.3 kg)     Mental Status:  {IP PT MENTAL STATUS:}    IV Access:  {[facility] [place]ESS:333189151}    Nursing Mobility/ADLs:  Walking   {[facility] DME YMQY:131603841}  Transfer  {[facility] DME WNMX:569662677}  Bathing  {[facility]P DME MBTZ:711478539}  Dressing  {[facility]P DME DZKE:646937196}  Toileting  {[facility] DME EKDM:186098681}  Feeding  {[facility] DME WBXV:977419611}  Med Admin  {[facility] DME VKMY:231847539}  Med Delivery   { SANDRO MED Delivery:752172029}    Wound Care Documentation and Therapy:  Wound 08/02/20 Buttocks syage two  noted on both buttocks (Active)   Number of days: 178       Wound 08/02/20 Buttocks Left stage 2  meplex off due to incontinence (Active)   Number of days: 178       Wound 01/23/21 Buttocks Left stage 2 (Active)   Wound Etiology Pressure Stage  1 01/26/21 1906   Wound Cleansed Soap and water 01/26/21 1906   Dressing/Treatment Zinc paste 01/26/21 1906   Drainage Amount None 01/24/21 0859   Odor None 01/24/21 0859   Yamileth-wound Assessment Intact 01/25/21 1919   Number of days: 4        Elimination:  Continence: Bowel: {YES / CI:34259}  Bladder: {YES / TK:37265}  Urinary Catheter: {Urinary Catheter:061606455}   Colostomy/Ileostomy/Ileal Conduit: {YES / QE:44282}       Date of Last BM: ***    Intake/Output Summary (Last 24 hours) at 1/27/2021 1147  Last data filed at 1/27/2021 0522  Gross per 24 hour   Intake 6 ml   Output 1525 ml   Net -1519 ml     I/O last 3 completed shifts:   In: 6 [I.V.:6]  Out: 2325 [Urine:2325]    Safety Concerns:     [address] Safety Concerns:977703441}    Impairments/Disabilities:      [address] Impairments/Disabilities:993584341}    Nutrition Therapy:  Current Nutrition Therapy:   [address] Diet List:773395927}    Routes of Feeding: {CHP DME Other Feedings:293423102}  Liquids: {Slp liquid thickness:67512}  Daily Fluid Restriction: {CHP DME Yes amt example:296000586}  Last Modified Barium Swallow with Video (Video Swallowing Test): {Done Not Done ML:010794850}    Treatments at the Time of Hospital Discharge:   Respiratory Treatments: ***  Oxygen Therapy:  {Therapy; copd oxygen:54951}  Ventilator:    {MH CC Vent TAPI:930842397}    Rehab Therapies: {THERAPEUTIC INTERVENTION:8823743113}  Weight Bearing Status/Restrictions: [address] CC Weight Bearin}  Other Medical Equipment (for information only, NOT a DME order):  {EQUIPMENT:352730812}  Other Treatments: ***    Patient's personal belongings (please select all that are sent with patient):  {CHP DME Belongings:688792877}    RN SIGNATURE:  {Esignature:193912021}    CASE MANAGEMENT/SOCIAL WORK SECTION    Inpatient Status Date: 21    Readmission Risk Assessment Score:  Readmission Risk              Risk of Unplanned Readmission:        24           Discharging to Facility/ Agency   Name: [address]  Address: [address] [place] [address] [facility]  Phone: [phone]    / signature: Electronically signed by JOHANN Sanders on 2021 at 11:48 AM      PHYSICIAN SECTION    Prognosis: Good    Condition at Discharge: Stable    Rehab Potential (if transferring to Rehab): Good    Recommended Labs or Other Treatments After Discharge: JAIME wsahburn in 1 week, follow up with urology at [facility], Dr Christin De Jesus    Physician Certification: I certify the above information and transfer of Yimi Pineda  is necessary for the continuing treatment of the diagnosis listed and that he requires [facility] for greater 30 days.      Update Admission H&P: No change in H&P    PHYSICIAN SIGNATURE:  Electronically signed by Bruna Samayoa MD on 21 at 7:17 AM EST

## 2021-01-27 NOTE — PROGRESS NOTES
Plan remains for patient to DC to [facility] on Thursday 1/28. Unit coordinator Cindi has arranged transportation with [facility] EMS supervisor Lon Loo. This  completed 0700 along with SANDRO and confirmed plan with Marilynn Duran in admissions at [facility]. Patient's mother was contacted via phone and updated on above. Patient's mother agreeable with DC plan. Report will need to be called to [facility] prior to patient's DC. SS to continue to follow as needed while patient is at [facility].

## 2021-01-27 NOTE — PROGRESS NOTES
Pt refused morning medications. States his stomach is upset and doesn't want breakfast either. Wishes to be left alone to watch a movie. Will attempt morning medications again once mother arrives.

## 2021-01-27 NOTE — PROGRESS NOTES
Nursing entered room to prepare pt for transport for PICC placement. Pt started yelling for nurses to \"get the hell out of my room\". Nursing reminded pt he is going for the procedure and will be leaving soon. Pt continued to yell at nursing staff using profane language. Called mother to calm pt. Pt agreed to take 3 of his morning medications. Pt in bed, call light in reach, bed in low position, side rails x2, alarm on. Pt refusing lunch. Will continue to monitor.

## 2021-01-27 NOTE — PROGRESS NOTES
Hospitalist Progress Note      PCP: Christina Neumann MD    Date of Admission: 1/22/2021    Chief Complaint:     Subjective: pt watching movies     Medications:  Reviewed    Infusion Medications    sodium chloride       Scheduled Medications    polyethylene glycol  17 g Oral BID    senna  2 tablet Oral BID    lactulose  20 g Oral BID    meropenem  1,000 mg Intravenous Q8H    lidocaine PF  5 mL Intradermal Once    sodium chloride flush  10 mL Intravenous 2 times per day    tamsulosin  0.4 mg Oral Daily    finasteride  5 mg Oral Daily    Magnesium Citrate  296 mL Oral Once    sodium chloride flush  3 mL Intravenous Q8H    baclofen  10 mg Oral QAM    busPIRone  5 mg Oral BID    gabapentin  100 mg Oral TID    lamoTRIgine  200 mg Oral BID    levothyroxine  50 mcg Oral Daily    divalproex  750 mg Oral Nightly    divalproex  250 mg Oral BID     PRN Meds: sodium chloride flush, acetaminophen, bisacodyl, ondansetron      Intake/Output Summary (Last 24 hours) at 1/27/2021 0959  Last data filed at 1/27/2021 0522  Gross per 24 hour   Intake 6 ml   Output 1525 ml   Net -1519 ml       Exam:    /79   Pulse 88   Temp 98.4 °F (36.9 °C)   Resp 18   Ht 5' 10\" (1.778 m)   Wt 214 lb 6.4 oz (97.3 kg)   SpO2 100%   BMI 30.76 kg/m²     General appearance: No apparent distress, appears stated age and partially cooperative. HEENT: Pupils equal, round, and reactive to light. Conjunctivae/corneas clear. Neck: Supple, with full range of motion. No jugular venous distention. Trachea midline. Respiratory:  Normal respiratory effort. Clear to auscultation, bilaterally without Rales/Wheezes/Rhonchi. Cardiovascular: Regular rate and rhythm with normal S1/S2 without murmurs, rubs or gallops. Abdomen: Soft, non-tender, non-distended with normal bowel sounds. Musculoskeletal: No clubbing, cyanosis or edema bilaterally. Skin: Skin color, texture, turgor normal.  No rashes or lesions.   Neurologic:  Neurovascularly intact without any focal sensory/motor deficits. C  Psychiatric:partially  Alert and oriented,   Capillary Refill: Brisk,< 3 seconds   Peripheral Pulses: +2 palpable, equal bilaterally       Labs:   Recent Labs     01/25/21  0531   WBC 4.4*   HGB 11.4*   HCT 34.9*        Recent Labs     01/25/21  0531      K 3.4      CO2 28   BUN 13   CREATININE 0.77   CALCIUM 9.2     No results for input(s): AST, ALT, BILIDIR, BILITOT, ALKPHOS in the last 72 hours. No results for input(s): INR in the last 72 hours. No results for input(s): Eliel Israel in the last 72 hours. Urinalysis:      Lab Results   Component Value Date    NITRU Negative 01/22/2021    WBCUA >100 01/22/2021    BACTERIA MANY 01/22/2021    RBCUA 0-2 01/22/2021    BLOODU Trace-intact 01/22/2021    SPECGRAV 1.025 01/22/2021    GLUCOSEU Negative 01/22/2021       Radiology:  CT ABDOMEN PELVIS W IV CONTRAST Additional Contrast? Oral   Final Result      MILDLY WORSENING MODERATE URINARY BLADDER DISTENTION AND WALL THICKENING. MILD UPPER URINARY TRACT DILATATION AND SURROUNDING INFLAMMATION SUGGESTIVE OF ASCENDING INFECTION. NO ABSCESS OR OTHER COMPLICATION IDENTIFIED. CONSTIPATION. XR ABDOMEN (KUB) (SINGLE AP VIEW)   Final Result   There are air-filled loops of colon including a distended cecum measuring 13 cm in greatest diameter. There is a large amount retained fecal material throughout the colon. The findings may be secondary to colonic ileus versus obstruction,    [place]. IR Picc Equal or Greater Than 5 Years    (Results Pending)           Assessment/Plan:    Active Hospital Problems    Diagnosis Date Noted    UTI (urinary tract infection) [N39.0] 01/22/2021         DVT Prophylaxis: hold  Diet: DIET GENERAL;  Code Status: Prior    PT/OT Eval Status:     Dispo - UTI/fever-  multidrug resistant klebsiella- continue with meropenem, ID on case.  Needs PICC for prolong atbs course    Acute/chronic urinary retention- washburn in place.  Added flomax/finasteride per  urology service   Constipation- added lactulose, follow up clinically  MRDD- supportive care, group home resident    Hypokalemia- replaced   Plan- SNF placement after PICC insertion   Medically stable for acute admission at [facility] signed by Collin Weber MD on 1/27/2021 at 9:59 AM

## 2021-01-28 ENCOUNTER — HOSPITAL ENCOUNTER (INPATIENT)
Age: 51
LOS: 11 days | Discharge: OTHER INSTITUTION | DRG: 689 | End: 2021-02-08
Attending: INTERNAL MEDICINE | Admitting: INTERNAL MEDICINE
Payer: MEDICARE

## 2021-01-28 VITALS
HEIGHT: 70 IN | HEART RATE: 87 BPM | WEIGHT: 214.4 LBS | BODY MASS INDEX: 30.69 KG/M2 | RESPIRATION RATE: 18 BRPM | SYSTOLIC BLOOD PRESSURE: 132 MMHG | DIASTOLIC BLOOD PRESSURE: 83 MMHG | TEMPERATURE: 98.5 F | OXYGEN SATURATION: 96 %

## 2021-01-28 PROBLEM — N39.0 UTI (URINARY TRACT INFECTION): Status: ACTIVE | Noted: 2021-01-28

## 2021-01-28 PROBLEM — B96.89 URINARY TRACT INFECTION DUE TO ESBL KLEBSIELLA: Status: ACTIVE | Noted: 2021-01-22

## 2021-01-28 PROCEDURE — 6360000002 HC RX W HCPCS: Performed by: INTERNAL MEDICINE

## 2021-01-28 PROCEDURE — 6370000000 HC RX 637 (ALT 250 FOR IP): Performed by: INTERNAL MEDICINE

## 2021-01-28 PROCEDURE — 2580000003 HC RX 258: Performed by: EMERGENCY MEDICINE

## 2021-01-28 PROCEDURE — 2580000003 HC RX 258: Performed by: INTERNAL MEDICINE

## 2021-01-28 PROCEDURE — 1200000002 HC SEMI PRIVATE SWING BED

## 2021-01-28 PROCEDURE — 99232 SBSQ HOSP IP/OBS MODERATE 35: CPT | Performed by: INTERNAL MEDICINE

## 2021-01-28 RX ORDER — BUSPIRONE HYDROCHLORIDE 5 MG/1
5 TABLET ORAL 2 TIMES DAILY
Status: CANCELLED | OUTPATIENT
Start: 2021-01-28

## 2021-01-28 RX ORDER — DIVALPROEX SODIUM 250 MG/1
250 TABLET, EXTENDED RELEASE ORAL 2 TIMES DAILY
Status: CANCELLED | OUTPATIENT
Start: 2021-01-28

## 2021-01-28 RX ORDER — BUSPIRONE HYDROCHLORIDE 5 MG/1
5 TABLET ORAL 2 TIMES DAILY
Status: DISCONTINUED | OUTPATIENT
Start: 2021-01-28 | End: 2021-02-08 | Stop reason: HOSPADM

## 2021-01-28 RX ORDER — SODIUM CHLORIDE 0.9 % (FLUSH) 0.9 %
10 SYRINGE (ML) INJECTION EVERY 12 HOURS SCHEDULED
Status: DISCONTINUED | OUTPATIENT
Start: 2021-01-28 | End: 2021-02-08 | Stop reason: HOSPADM

## 2021-01-28 RX ORDER — BACLOFEN 10 MG/1
10 TABLET ORAL EVERY MORNING
Status: CANCELLED | OUTPATIENT
Start: 2021-01-29

## 2021-01-28 RX ORDER — FINASTERIDE 5 MG/1
5 TABLET, FILM COATED ORAL DAILY
Status: CANCELLED | OUTPATIENT
Start: 2021-01-29

## 2021-01-28 RX ORDER — FINASTERIDE 5 MG/1
5 TABLET, FILM COATED ORAL DAILY
Status: DISCONTINUED | OUTPATIENT
Start: 2021-01-29 | End: 2021-02-08 | Stop reason: HOSPADM

## 2021-01-28 RX ORDER — LACTULOSE 10 G/15ML
20 SOLUTION ORAL 2 TIMES DAILY
Status: DISCONTINUED | OUTPATIENT
Start: 2021-01-28 | End: 2021-02-08 | Stop reason: HOSPADM

## 2021-01-28 RX ORDER — GABAPENTIN 100 MG/1
100 CAPSULE ORAL 3 TIMES DAILY
Status: CANCELLED | OUTPATIENT
Start: 2021-01-28

## 2021-01-28 RX ORDER — ACETAMINOPHEN 325 MG/1
650 TABLET ORAL EVERY 6 HOURS PRN
Status: CANCELLED | OUTPATIENT
Start: 2021-01-28

## 2021-01-28 RX ORDER — TAMSULOSIN HYDROCHLORIDE 0.4 MG/1
0.4 CAPSULE ORAL DAILY
Status: CANCELLED | OUTPATIENT
Start: 2021-01-29

## 2021-01-28 RX ORDER — LEVOTHYROXINE SODIUM 0.03 MG/1
50 TABLET ORAL DAILY
Status: CANCELLED | OUTPATIENT
Start: 2021-01-29

## 2021-01-28 RX ORDER — BACLOFEN 10 MG/1
10 TABLET ORAL EVERY MORNING
Status: DISCONTINUED | OUTPATIENT
Start: 2021-01-29 | End: 2021-02-08 | Stop reason: HOSPADM

## 2021-01-28 RX ORDER — SODIUM CHLORIDE 0.9 % (FLUSH) 0.9 %
3 SYRINGE (ML) INJECTION EVERY 8 HOURS
Status: DISCONTINUED | OUTPATIENT
Start: 2021-01-28 | End: 2021-01-31

## 2021-01-28 RX ORDER — LEVOTHYROXINE SODIUM 0.03 MG/1
50 TABLET ORAL DAILY
Status: DISCONTINUED | OUTPATIENT
Start: 2021-01-29 | End: 2021-02-08 | Stop reason: HOSPADM

## 2021-01-28 RX ORDER — TAMSULOSIN HYDROCHLORIDE 0.4 MG/1
0.4 CAPSULE ORAL DAILY
Status: DISCONTINUED | OUTPATIENT
Start: 2021-01-29 | End: 2021-02-08 | Stop reason: HOSPADM

## 2021-01-28 RX ORDER — SENNA PLUS 8.6 MG/1
2 TABLET ORAL 2 TIMES DAILY
Status: DISCONTINUED | OUTPATIENT
Start: 2021-01-28 | End: 2021-02-08 | Stop reason: HOSPADM

## 2021-01-28 RX ORDER — ONDANSETRON 4 MG/1
4 TABLET, ORALLY DISINTEGRATING ORAL EVERY 8 HOURS PRN
Status: CANCELLED | OUTPATIENT
Start: 2021-01-28

## 2021-01-28 RX ORDER — POLYETHYLENE GLYCOL 3350 17 G/17G
17 POWDER, FOR SOLUTION ORAL 2 TIMES DAILY
Status: CANCELLED | OUTPATIENT
Start: 2021-01-28

## 2021-01-28 RX ORDER — SENNA PLUS 8.6 MG/1
2 TABLET ORAL 2 TIMES DAILY
Status: CANCELLED | OUTPATIENT
Start: 2021-01-28

## 2021-01-28 RX ORDER — ONDANSETRON 4 MG/1
4 TABLET, ORALLY DISINTEGRATING ORAL EVERY 8 HOURS PRN
Status: DISCONTINUED | OUTPATIENT
Start: 2021-01-28 | End: 2021-02-08 | Stop reason: HOSPADM

## 2021-01-28 RX ORDER — BISACODYL 10 MG
10 SUPPOSITORY, RECTAL RECTAL DAILY PRN
Status: CANCELLED | OUTPATIENT
Start: 2021-01-28

## 2021-01-28 RX ORDER — BISACODYL 10 MG
10 SUPPOSITORY, RECTAL RECTAL DAILY PRN
Status: DISCONTINUED | OUTPATIENT
Start: 2021-01-28 | End: 2021-02-08 | Stop reason: HOSPADM

## 2021-01-28 RX ORDER — FINASTERIDE 5 MG/1
5 TABLET, FILM COATED ORAL DAILY
Qty: 30 TABLET | Refills: 3 | Status: ON HOLD | OUTPATIENT
Start: 2021-01-28

## 2021-01-28 RX ORDER — SODIUM CHLORIDE 0.9 % (FLUSH) 0.9 %
10 SYRINGE (ML) INJECTION PRN
Status: DISCONTINUED | OUTPATIENT
Start: 2021-01-28 | End: 2021-02-08 | Stop reason: HOSPADM

## 2021-01-28 RX ORDER — POLYETHYLENE GLYCOL 3350 17 G/17G
17 POWDER, FOR SOLUTION ORAL 2 TIMES DAILY
Status: DISCONTINUED | OUTPATIENT
Start: 2021-01-28 | End: 2021-02-08 | Stop reason: HOSPADM

## 2021-01-28 RX ORDER — ACETAMINOPHEN 325 MG/1
650 TABLET ORAL EVERY 6 HOURS PRN
Status: DISCONTINUED | OUTPATIENT
Start: 2021-01-28 | End: 2021-02-08 | Stop reason: HOSPADM

## 2021-01-28 RX ORDER — SODIUM CHLORIDE 0.9 % (FLUSH) 0.9 %
10 SYRINGE (ML) INJECTION EVERY 12 HOURS SCHEDULED
Status: CANCELLED | OUTPATIENT
Start: 2021-01-28

## 2021-01-28 RX ORDER — DIVALPROEX SODIUM 250 MG/1
750 TABLET, EXTENDED RELEASE ORAL NIGHTLY
Status: DISCONTINUED | OUTPATIENT
Start: 2021-01-28 | End: 2021-02-08 | Stop reason: HOSPADM

## 2021-01-28 RX ORDER — TAMSULOSIN HYDROCHLORIDE 0.4 MG/1
0.4 CAPSULE ORAL DAILY
Qty: 30 CAPSULE | Refills: 3 | Status: ON HOLD | OUTPATIENT
Start: 2021-01-28

## 2021-01-28 RX ORDER — GABAPENTIN 100 MG/1
100 CAPSULE ORAL 3 TIMES DAILY
Status: DISCONTINUED | OUTPATIENT
Start: 2021-01-28 | End: 2021-02-08 | Stop reason: HOSPADM

## 2021-01-28 RX ORDER — SODIUM CHLORIDE 0.9 % (FLUSH) 0.9 %
3 SYRINGE (ML) INJECTION EVERY 8 HOURS
Status: CANCELLED | OUTPATIENT
Start: 2021-01-28

## 2021-01-28 RX ORDER — DIVALPROEX SODIUM 250 MG/1
250 TABLET, EXTENDED RELEASE ORAL 2 TIMES DAILY
Status: DISCONTINUED | OUTPATIENT
Start: 2021-01-28 | End: 2021-02-08 | Stop reason: HOSPADM

## 2021-01-28 RX ORDER — DIVALPROEX SODIUM 250 MG/1
750 TABLET, EXTENDED RELEASE ORAL NIGHTLY
Status: CANCELLED | OUTPATIENT
Start: 2021-01-28

## 2021-01-28 RX ORDER — LAMOTRIGINE 100 MG/1
200 TABLET ORAL 2 TIMES DAILY
Status: CANCELLED | OUTPATIENT
Start: 2021-01-28

## 2021-01-28 RX ORDER — LAMOTRIGINE 100 MG/1
200 TABLET ORAL 2 TIMES DAILY
Status: DISCONTINUED | OUTPATIENT
Start: 2021-01-28 | End: 2021-02-08 | Stop reason: HOSPADM

## 2021-01-28 RX ORDER — SODIUM CHLORIDE 0.9 % (FLUSH) 0.9 %
10 SYRINGE (ML) INJECTION PRN
Status: CANCELLED | OUTPATIENT
Start: 2021-01-28

## 2021-01-28 RX ORDER — LACTULOSE 10 G/15ML
20 SOLUTION ORAL 2 TIMES DAILY
Status: CANCELLED | OUTPATIENT
Start: 2021-01-28

## 2021-01-28 RX ADMIN — FINASTERIDE 5 MG: 5 TABLET, FILM COATED ORAL at 07:46

## 2021-01-28 RX ADMIN — DIVALPROEX SODIUM 750 MG: 250 TABLET, FILM COATED, EXTENDED RELEASE ORAL at 22:28

## 2021-01-28 RX ADMIN — MEROPENEM 1000 MG: 1 INJECTION, POWDER, FOR SOLUTION INTRAVENOUS at 14:22

## 2021-01-28 RX ADMIN — BACLOFEN 10 MG: 10 TABLET ORAL at 07:46

## 2021-01-28 RX ADMIN — MEROPENEM 1000 MG: 1 INJECTION, POWDER, FOR SOLUTION INTRAVENOUS at 06:58

## 2021-01-28 RX ADMIN — POLYETHYLENE GLYCOL 3350 17 G: 17 POWDER, FOR SOLUTION ORAL at 07:48

## 2021-01-28 RX ADMIN — LAMOTRIGINE 200 MG: 100 TABLET ORAL at 07:47

## 2021-01-28 RX ADMIN — GABAPENTIN 100 MG: 100 CAPSULE ORAL at 14:22

## 2021-01-28 RX ADMIN — Medication 10 ML: at 22:36

## 2021-01-28 RX ADMIN — Medication 3 ML: at 14:24

## 2021-01-28 RX ADMIN — GABAPENTIN 100 MG: 100 CAPSULE ORAL at 07:46

## 2021-01-28 RX ADMIN — DIVALPROEX SODIUM 250 MG: 250 TABLET, FILM COATED, EXTENDED RELEASE ORAL at 22:37

## 2021-01-28 RX ADMIN — BUSPIRONE HYDROCHLORIDE 5 MG: 5 TABLET ORAL at 22:28

## 2021-01-28 RX ADMIN — TAMSULOSIN HYDROCHLORIDE 0.4 MG: 0.4 CAPSULE ORAL at 07:46

## 2021-01-28 RX ADMIN — SENNOSIDES 17.2 MG: 8.6 TABLET, FILM COATED ORAL at 07:47

## 2021-01-28 RX ADMIN — Medication 10 ML: at 07:54

## 2021-01-28 RX ADMIN — Medication 3 ML: at 07:54

## 2021-01-28 RX ADMIN — GABAPENTIN 100 MG: 100 CAPSULE ORAL at 22:28

## 2021-01-28 RX ADMIN — DIVALPROEX SODIUM 250 MG: 250 TABLET, FILM COATED, EXTENDED RELEASE ORAL at 07:48

## 2021-01-28 RX ADMIN — SENNOSIDES 17.2 MG: 8.6 TABLET, FILM COATED ORAL at 22:28

## 2021-01-28 RX ADMIN — LAMOTRIGINE 200 MG: 100 TABLET ORAL at 22:28

## 2021-01-28 RX ADMIN — MEROPENEM 1000 MG: 1 INJECTION, POWDER, FOR SOLUTION INTRAVENOUS at 22:28

## 2021-01-28 RX ADMIN — BUSPIRONE HYDROCHLORIDE 5 MG: 5 TABLET ORAL at 07:47

## 2021-01-28 ASSESSMENT — PAIN SCALES - WONG BAKER: WONGBAKER_NUMERICALRESPONSE: 0

## 2021-01-28 NOTE — FLOWSHEET NOTE
Patient has refused to eat breakfast and lunch today. Patient drank two orange juices with breakfast and a sip of pop for lunch. Patient encouraged to eat and states that he does not want to eat right now and wants to watch his movie. Patient is assisted to set up movie with home dvd player. Patient's lunch tray left in room for hopes patients will want to eat. Will continue to encourage PO intake. Call light remains in reach and bed alarm in place. 1309: Patient ate 2 small baked potatoes for lunch. Refused meat and corn at this time. Home movie continues to play. Bed alarm in place and call light within reach. 1529: Patient has refused to be changed and checked all shift. Patient becomes very aggressive with staff verbally and physically. This RN called patients mom to try to calm patient so we could check him. Patient remained verbal. Call to paramedic to help assist in change. Patient calmed and allowed us to reposition him and change brief. No BM but Zinc cream applied to patient. Patient was a MAX assist to turn. Patient now calm.  Electronically signed by Sean Mills RN on 1/28/2021 at 4:36 PM

## 2021-01-28 NOTE — PROGRESS NOTES
Infectious Diseases Inpatient Progress Note      Patient was informed that this is a billable visit. I am in my office, patient is at [facility]. Doxy. me/ heri was used for communication and exam. Chart was reviewed and labs/ X Rays were discussed with the patient. our practice is making every effort to adhere to current recommendations, including social distancing. For the health and safety of our patients, and to prevent unnecessary exposure, we are currently scheduling telephone appointments. Patient was informed that these appointments are official appointments and will be billed through patient's insurance . Patient confirms this and agreed to the televised visit. Time spend in review of chart and on the IPAD using Doxy. me was 30 minutes. Nurse is assisting with physical exam and history      HISTORY OF PRESENT ILLNESS:  Follow up ESBL Klebsiella UTI, recurrent urinary tract infection with history of kidney stones and neurogenic bladder on IV meropenem, well tolerated. Patient has a Jameson catheter with good urine output. No fevers. Some confusion and anxiety. Denies any pain. Has good appetite.     Current Medications:     polyethylene glycol  17 g Oral BID    senna  2 tablet Oral BID    lactulose  20 g Oral BID    meropenem  1,000 mg Intravenous Q8H    lidocaine PF  5 mL Intradermal Once    sodium chloride flush  10 mL Intravenous 2 times per day    tamsulosin  0.4 mg Oral Daily    finasteride  5 mg Oral Daily    sodium chloride flush  3 mL Intravenous Q8H    baclofen  10 mg Oral QAM    busPIRone  5 mg Oral BID    gabapentin  100 mg Oral TID    lamoTRIgine  200 mg Oral BID    levothyroxine  50 mcg Oral Daily    divalproex  750 mg Oral Nightly    divalproex  250 mg Oral BID       Allergies:  Cefazolin, Celontin [methsuximide], Duricef [cefadroxil], Simvastatin, Tramadol, and Vicodin [hydrocodone-acetaminophen]      Review of Systems  Limited 14 system review is negative other than HPI because of underlying chronic medical condition    Physical Exam  Vitals signs and nursing note reviewed. Afebrile with stable vital signs on room air  General Appearance: alert and oriented to self, knew that he was in the hospital but unsure what hospital, well-developed and well-nourished, in no acute distress, on room air  Skin: warm anddry, no rash. Head: normocephalic and atraumatic  Eyes: anicteric sclerae  ENT:  normal mucous membranes. Lungs: normal respiratory effort  Abdomen:  no distention  NEUROLOGICAL: alert and oriented x 1.5, no focal deficits  No leg edema  No erythema  Dark yellow urine in Jameson    DATA:    Lab Results   Component Value Date    WBC 4.4 (L) 01/25/2021    HGB 11.4 (L) 01/25/2021    HCT 34.9 (L) 01/25/2021    MCV 88.2 01/25/2021     01/25/2021     Lab Results   Component Value Date    CREATININE 0.77 01/25/2021    BUN 13 01/25/2021     01/25/2021    K 3.4 01/25/2021     01/25/2021    CO2 28 01/25/2021       Hepatic Function Panel:  Lab Results   Component Value Date    ALKPHOS 61 01/22/2021    ALT 8 01/22/2021    AST 20 01/22/2021    PROT 6.8 01/22/2021    BILITOT 0.5 01/22/2021    LABALBU 4.1 01/22/2021       Imaging:         Impression       MILDLY WORSENING MODERATE URINARY BLADDER DISTENTION AND WALL THICKENING. MILD UPPER URINARY TRACT DILATATION AND SURROUNDING INFLAMMATION SUGGESTIVE OF ASCENDING INFECTION. NO ABSCESS OR OTHER COMPLICATION IDENTIFIED. CONSTIPATION.        IMPRESSION:    · ESBL Klebsiella UTI, recurrent  · History of kidney stone and neurogenic bladder     ·     Patient Active Problem List   Diagnosis    Sepsis ([address]    Small bowel obstruction ([address]    Epileptic seizure ([address]    Hypothyroidism    HTN (hypertension)    Moderate intellectual disabilities    Chronic pain    Recurrent UTI    ESBL (extended spectrum beta-lactamase) producing bacteria infection    Functional urinary incontinence    Gastroesophageal reflux disease without esophagitis    Iron deficiency anemia    UTI (urinary tract infection)       PLAN:  IV meropenem 1 g IV every 8 for 10-14 days   Repeat urinalysis with reflex culture next week to ensure urine clearing prior to discontinuation of antibiotic therapy  Recommend urology evaluation  ·     Discussed with patient and RN    Carmen Mancini MD

## 2021-01-28 NOTE — DISCHARGE INSTR - DIET

## 2021-01-28 NOTE — PROGRESS NOTES
I received a call from Rob, @ [facility]. She stated they are not able to admit pt there, d/t 3-4 new Covid cases since last evening. She is contacting his mother to inquire if she wants him admitted to a sister facility. Rob will return a call to me.

## 2021-01-28 NOTE — DISCHARGE SUMMARY
Discharge Summary    Patient:  Harvinder Humphries  YOB: 1970    MRN: [id number]   Acct: [de-identified]    Primary Care Physician: Folr Colunga MD    Admit date:  1/22/2021    Discharge date:   01/28/21      Discharge Diagnoses:   <principal problem not specified>  Active Problems:    UTI (urinary tract infection)  Resolved Problems:    * No resolved hospital problems. *      Admitted for: [facility] Course: patient was admitted with fever, was found to have ESBL klebsiella UTI. Per urology recommended to have washburn, proscar/finasteride initiated as well. Received PICC and needs to continue with IV meropenem 10 more days per ID recommendation. Will needs DC washburn 1 week after DC and follow up with urology as outpatient     Consultants:  Urology/ID    Discharge Medications:       Medication List      START taking these medications    finasteride 5 MG tablet  Commonly known as: PROSCAR  Take 1 tablet by mouth daily     meropenem  infusion  Commonly known as: MERREM  Infuse 1,000 mg intravenously every 8 hours for 10 days Compound per protocol. tamsulosin 0.4 MG capsule  Commonly known as: FLOMAX  Take 1 capsule by mouth daily        CONTINUE taking these medications    acetaminophen 325 MG tablet  Commonly known as: TYLENOL     baclofen 10 MG tablet  Commonly known as: LIORESAL     busPIRone 5 MG tablet  Commonly known as: BUSPAR     calcium carbonate 750 MG chewable tablet  Commonly known as: TUMS EX     * Depakote  MG extended release tablet  Generic drug: divalproex     * Depakote  MG extended release tablet  Generic drug: divalproex     diazePAM 10 MG Gel  Commonly known as: DIASTAT     DULCOLAX RE     ferrous sulfate 325 (65 Fe) MG tablet  Commonly known as: IRON 325  Take 1 tablet by mouth 2 times daily (with meals)     gabapentin 100 MG capsule  Commonly known as: NEURONTIN  Take 1 capsule by mouth 3 times daily for 30 days.      guaiFENesin 600 MG extended release tablet  Commonly known as: MUCINEX     lamoTRIgine 200 MG tablet  Commonly known as: LAMICTAL     levothyroxine 50 MCG tablet  Commonly known as: SYNTHROID     loratadine 10 MG tablet  Commonly known as: CLARITIN     ondansetron 4 MG tablet  Commonly known as: ZOFRAN         * This list has 2 medication(s) that are the same as other medications prescribed for you. Read the directions carefully, and ask your doctor or other care provider to review them with you.             STOP taking these medications    amLODIPine 2.5 MG tablet  Commonly known as: NORVASC     imipenem-cilastatin 500 MG injection  Commonly known as: PRIMAXIN     propranolol 60 MG extended release capsule  Commonly known as: INDERAL LA           Where to Get Your Medications      These medications were sent to [facility] [place], [facility]  [address] [id number]    Phone: [phone]   · finasteride 5 MG tablet  · tamsulosin 0.4 MG capsule     You can get these medications from any pharmacy    Bring a paper prescription for each of these medications  · meropenem  infusion         Physical Exam:    Vitals:  Vitals:    01/26/21 1933 01/26/21 1935 01/27/21 0528 01/28/21 0545   BP: 120/61 120/61 101/79 132/83   Pulse: 106 104 88 87   Resp: 18  18 18   Temp: 98.5 °F (36.9 °C) 98.4 °F (36.9 °C)  98.5 °F (36.9 °C)   TempSrc: Oral   Oral   SpO2: 97% 96% 100% 96%   Weight:       Height:         Weight: Weight: 214 lb 6.4 oz (97.3 kg)     24 hour intake/output:    Intake/Output Summary (Last 24 hours) at 1/28/2021 8104  Last data filed at 1/27/2021 2145  Gross per 24 hour   Intake --   Output 1050 ml   Net -1050 ml       General appearance - alert, well appearing, and in no distress  Chest - clear to auscultation, no wheezes, rales or rhonchi, symmetric air entry  Heart - normal rate, regular rhythm, normal S1, S2, no murmurs, rubs, clicks or gallops  Abdomen - soft, nontender, nondistended, no masses or organomegaly  Obese: No; Protuberant: No   Neurological - alert, oriented, normal speech,  Extremities - peripheral pulses normal, no pedal edema, no clubbing or cyanosis  Skin - normal coloration and turgor, stage 2 decub wound         Radiology reports as per the Radiologist  Radiology: Ir Picc Equal Or Greater Than 5 Years    Result Date: 1/27/2021  PERIPHERALLY INSERTED CENTRAL CATHETER (PICC) PLACEMENT WITH ULTRASOUND GUIDANCE: CLINICAL HISTORY:  ANTIBIOTIC THERAPY FOR ESBL OF URINE. TECHNIQUE: A total of 3 fluoroscopic images over 27.0 seconds were obtained. Central venous catheter was inserted using a maximal sterile barrier technique which includes cap, mask, sterile gown, sterile gloves, sterile full-body drape, hand hygiene and 2% chlorhexidine for cutaneous antisepsis. A sterile ultrasound technique with sterile gel and sterile probe covers was also utilized. A pre-procedure time out was performed in order to assure the correct patient and procedure. Local anesthetic was administered. The right brachial vein was accessed with sonographic guidance. A sonographic spot image was obtained for documentation. A  guidewire was advanced into the vein with fluoroscopic guidance and a sheath was placed over the guidewire. An Arrow 5-[place] 40 cm dual lumen PICC was advanced through the sheath, up the arm and into the central vasculature. It was positioned appropriately. The sheath was removed. The catheter was shown to aspirate and infuse properly. The flange of the catheter was affixed to the arm using a PICC securement device. A spot image of the chest showed the tip of the PICC line to lie in the cavoatrial junction. The patient tolerated the procedure well and without complications. 33 of fluoroscopy were used, with a single screen image saved. No diagnostic images were obtained. SUCCESSFUL PICC PLACEMENT WITHOUT IMMEDIATE COMPLICATIONS.        Results for orders placed or performed during the hospital encounter of 01/22/21   Culture, Urine    Specimen: Urine, clean catch   Result Value Ref Range    Organism Klebsiella pneumoniae ESBL (A)     Urine Culture, Routine (A)      >100,000 CFU/ml  Carbapenem antibiotics are the best option for infections caused  by ESBL producing organisms. Other antibiotic classes are  likely to result in treatment failure, even for organisms  demonstrating in vitro susceptibility.   CONTACT PRECAUTIONS INDICATED         Susceptibility    Klebsiella pneumoniae (esbl) - BACTERIAL SUSCEPTIBILITY PANEL BY HERB     amoxicillin-clavulanate 16 Resistant mcg/mL     ceFAZolin >=64 Resistant mcg/mL     cefepime 2 Resistant mcg/mL     cefTRIAXone >=64 Resistant mcg/mL     ciprofloxacin >=4 Resistant mcg/mL     gentamicin >=16 Resistant mcg/mL     imipenem <=0.25 Sensitive mcg/mL     levofloxacin >=8 Resistant mcg/mL     nitrofurantoin 64 Intermediate mcg/mL     piperacillin-tazobactam 16 Resistant mcg/mL     tobramycin >=16 Resistant mcg/mL     trimethoprim-sulfamethoxazole >=320 Resistant mcg/mL   Clostridium difficile toxin/antigen    Specimen: Stool   Result Value Ref Range    C.diff Toxin/Antigen       Negative for Clostridium difficile antigen and toxin  Normal Range: Negative     Comprehensive Metabolic Panel   Result Value Ref Range    Sodium 142 135 - 144 mEq/L    Potassium 3.7 3.4 - 4.9 mEq/L    Chloride 104 95 - 107 mEq/L    CO2 25 20 - 31 mEq/L    Anion Gap 13 9 - 15 mEq/L    Glucose 87 70 - 99 mg/dL    BUN 12 6 - 20 mg/dL    CREATININE 0.81 0.70 - 1.20 mg/dL    GFR Non-African American >60.0 >60    GFR  >60.0 >60    Calcium 9.8 8.5 - 9.9 mg/dL    Total Protein 6.8 6.3 - 8.0 g/dL    Albumin 4.1 3.5 - 4.6 g/dL    Total Bilirubin 0.5 0.2 - 0.7 mg/dL    Alkaline Phosphatase 61 35 - 104 U/L    ALT 8 0 - 41 U/L    AST 20 0 - 40 U/L    Globulin 2.7 2.3 - 3.5 g/dL   CBC Auto Differential   Result Value Ref Range    WBC 4.8 4.8 - 10.8 K/uL    RBC 4.08 (L) 4.70 - 6.10 M/uL    Hemoglobin 11.7 (L) 14.0 - 18.0 g/dL    Hematocrit 36.0 (L) 42.0 - 52.0 %    MCV 88.3 80.0 - 100.0 fL    MCH 28.8 27.0 - 31.3 pg    MCHC 32.6 (L) 33.0 - 37.0 %    RDW 15.4 (H) 11.5 - 14.5 %    Platelets 783 308 - 667 K/uL    Neutrophils % 53.5 %    Lymphocytes % 31.0 %    Monocytes % 9.2 %    Eosinophils % 5.6 %    Basophils % 0.7 %    Neutrophils Absolute 2.6 1.4 - 6.5 K/uL    Lymphocytes Absolute 1.5 1.0 - 4.8 K/uL    Monocytes Absolute 0.4 0.2 - 0.8 K/uL    Eosinophils Absolute 0.3 0.0 - 0.7 K/uL    Basophils Absolute 0.0 0.0 - 0.2 K/uL   Urine Reflex to Culture    Specimen: Urine, clean catch   Result Value Ref Range    Color, UA Yellow Straw/Yellow    Clarity, UA Cloudy Clear    Glucose, Ur Negative Negative mg/dL    Bilirubin Urine Small Negative    Ketones, Urine 15 (A) Negative mg/dL    Specific Gravity, UA 1.025 1.005 - 1.030    Blood, Urine Trace-intact Negative    pH, UA 7.0 5.0 - 9.0    Protein,  (A) Negative mg/dL    Urobilinogen, Urine 1.0 <2.0 E.U./dL    Nitrite, Urine Negative Negative    Leukocyte Esterase, Urine Moderate Negative    Urine Reflex to Culture Yes    Lipase   Result Value Ref Range    Lipase 34 12 - 95 U/L   Amylase   Result Value Ref Range    Amylase 61 22 - 93 U/L   Microscopic Urinalysis   Result Value Ref Range    WBC, UA >100 (A) 0 - 5 /HPF    RBC, UA 0-2 0 - 2 /HPF    Epithelial Cells, UA 0-2 /HPF    Bacteria, UA MANY (A) Negative /HPF   COVID-19   Result Value Ref Range    SARS-CoV-2, NAAT Not Detected Not Detected   CBC Auto Differential   Result Value Ref Range    WBC 5.4 4.8 - 10.8 K/uL    RBC 4.15 (L) 4.70 - 6.10 M/uL    Hemoglobin 12.2 (L) 14.0 - 18.0 g/dL    Hematocrit 36.8 (L) 42.0 - 52.0 %    MCV 88.6 80.0 - 100.0 fL    MCH 29.4 27.0 - 31.3 pg    MCHC 33.2 33.0 - 37.0 %    RDW 15.6 (H) 11.5 - 14.5 %    Platelets 306 878 - 719 K/uL    Neutrophils % 73.0 %    Lymphocytes % 12.6 %    Monocytes % 9.7 %    Eosinophils % 4.5 %    Basophils % 0.2 %    Neutrophils Absolute 3.9 1.4 - 6.5 K/uL    Lymphocytes Absolute 0.7 (L) 1.0 - 4.8 K/uL    Monocytes Absolute 0.5 0.2 - 0.8 K/uL    Eosinophils Absolute 0.2 0.0 - 0.7 K/uL    Basophils Absolute 0.0 0.0 - 0.2 K/uL   Basic Metabolic Panel   Result Value Ref Range    Sodium 143 135 - 144 mEq/L    Potassium 4.0 3.4 - 4.9 mEq/L    Chloride 104 95 - 107 mEq/L    CO2 29 20 - 31 mEq/L    Anion Gap 10 9 - 15 mEq/L    Glucose 117 (H) 70 - 99 mg/dL    BUN 19 6 - 20 mg/dL    CREATININE 1.04 0.70 - 1.20 mg/dL    GFR Non-African American >60.0 >60    GFR  >60.0 >60    Calcium 9.7 8.5 - 9.9 mg/dL   Basic Metabolic Panel   Result Value Ref Range    Sodium 142 135 - 144 mEq/L    Potassium 3.4 3.4 - 4.9 mEq/L    Chloride 104 95 - 107 mEq/L    CO2 28 20 - 31 mEq/L    Anion Gap 10 9 - 15 mEq/L    Glucose 94 70 - 99 mg/dL    BUN 13 6 - 20 mg/dL    CREATININE 0.77 0.70 - 1.20 mg/dL    GFR Non-African American >60.0 >60    GFR  >60.0 >60    Calcium 9.2 8.5 - 9.9 mg/dL   CBC Auto Differential   Result Value Ref Range    WBC 4.4 (L) 4.8 - 10.8 K/uL    RBC 3.95 (L) 4.70 - 6.10 M/uL    Hemoglobin 11.4 (L) 14.0 - 18.0 g/dL    Hematocrit 34.9 (L) 42.0 - 52.0 %    MCV 88.2 80.0 - 100.0 fL    MCH 28.9 27.0 - 31.3 pg    MCHC 32.7 (L) 33.0 - 37.0 %    RDW 15.6 (H) 11.5 - 14.5 %    Platelets 456 964 - 363 K/uL    Neutrophils % 52.2 %    Lymphocytes % 27.3 %    Monocytes % 9.8 %    Eosinophils % 10.3 %    Basophils % 0.4 %    Neutrophils Absolute 2.3 1.4 - 6.5 K/uL    Lymphocytes Absolute 1.2 1.0 - 4.8 K/uL    Monocytes Absolute 0.4 0.2 - 0.8 K/uL    Eosinophils Absolute 0.5 0.0 - 0.7 K/uL    Basophils Absolute 0.0 0.0 - 0.2 K/uL   COVID-19   Result Value Ref Range    SARS-CoV-2, NAAT Not Detected Not Detected       Diet:  DIET GENERAL;  Dietary Nutrition Supplements: Diabetic Oral Supplement    Activity:  Activity as tolerated (Patient may move about with assist as indicated or with supervision.)    Follow-up:  in 1 weeks with Marcelina Runner, MD,      Disposition: SNF    Condition: Stable    Time Spent: 45 minutes    Electronically signed by Kodi Levi MD on 1/28/2021 at 7:23 AM    Discharging Hospitalist

## 2021-01-29 PROBLEM — E43 SEVERE MALNUTRITION (HCC): Chronic | Status: ACTIVE | Noted: 2021-01-29

## 2021-01-29 PROCEDURE — 2580000003 HC RX 258: Performed by: INTERNAL MEDICINE

## 2021-01-29 PROCEDURE — 6370000000 HC RX 637 (ALT 250 FOR IP): Performed by: INTERNAL MEDICINE

## 2021-01-29 PROCEDURE — 97163 PT EVAL HIGH COMPLEX 45 MIN: CPT

## 2021-01-29 PROCEDURE — 6360000002 HC RX W HCPCS: Performed by: INTERNAL MEDICINE

## 2021-01-29 PROCEDURE — 1200000002 HC SEMI PRIVATE SWING BED

## 2021-01-29 PROCEDURE — 97530 THERAPEUTIC ACTIVITIES: CPT

## 2021-01-29 RX ADMIN — LAMOTRIGINE 200 MG: 100 TABLET ORAL at 20:14

## 2021-01-29 RX ADMIN — Medication 10 ML: at 22:11

## 2021-01-29 RX ADMIN — SENNOSIDES 17.2 MG: 8.6 TABLET, FILM COATED ORAL at 10:01

## 2021-01-29 RX ADMIN — BUSPIRONE HYDROCHLORIDE 5 MG: 5 TABLET ORAL at 10:01

## 2021-01-29 RX ADMIN — BACLOFEN 10 MG: 10 TABLET ORAL at 10:01

## 2021-01-29 RX ADMIN — DIVALPROEX SODIUM 250 MG: 250 TABLET, FILM COATED, EXTENDED RELEASE ORAL at 20:18

## 2021-01-29 RX ADMIN — Medication 3 ML: at 07:00

## 2021-01-29 RX ADMIN — TAMSULOSIN HYDROCHLORIDE 0.4 MG: 0.4 CAPSULE ORAL at 10:01

## 2021-01-29 RX ADMIN — MEROPENEM 1000 MG: 1 INJECTION, POWDER, FOR SOLUTION INTRAVENOUS at 14:53

## 2021-01-29 RX ADMIN — MEROPENEM 1000 MG: 1 INJECTION, POWDER, FOR SOLUTION INTRAVENOUS at 06:56

## 2021-01-29 RX ADMIN — MEROPENEM 1000 MG: 1 INJECTION, POWDER, FOR SOLUTION INTRAVENOUS at 22:11

## 2021-01-29 RX ADMIN — DIVALPROEX SODIUM 750 MG: 250 TABLET, FILM COATED, EXTENDED RELEASE ORAL at 20:15

## 2021-01-29 RX ADMIN — BUSPIRONE HYDROCHLORIDE 5 MG: 5 TABLET ORAL at 20:16

## 2021-01-29 RX ADMIN — FINASTERIDE 5 MG: 5 TABLET, FILM COATED ORAL at 10:01

## 2021-01-29 RX ADMIN — GABAPENTIN 100 MG: 100 CAPSULE ORAL at 20:14

## 2021-01-29 RX ADMIN — GABAPENTIN 100 MG: 100 CAPSULE ORAL at 10:00

## 2021-01-29 RX ADMIN — DIVALPROEX SODIUM 250 MG: 250 TABLET, FILM COATED, EXTENDED RELEASE ORAL at 10:01

## 2021-01-29 RX ADMIN — GABAPENTIN 100 MG: 100 CAPSULE ORAL at 14:53

## 2021-01-29 RX ADMIN — Medication 10 ML: at 10:01

## 2021-01-29 RX ADMIN — LAMOTRIGINE 200 MG: 100 TABLET ORAL at 10:01

## 2021-01-29 ASSESSMENT — PAIN SCALES - GENERAL: PAINLEVEL_OUTOF10: 0

## 2021-01-29 ASSESSMENT — PAIN SCALES - WONG BAKER: WONGBAKER_NUMERICALRESPONSE: 0

## 2021-01-29 NOTE — H&P
Hospital Medicine History & Physical      PCP: Emeli Cohn MD    Date of Admission: 1/28/2021    Date of Service: 1/29/21      Chief Complaint:  UTI/fever      History Of Present Illness:  44 y.o. male who presented to [facility] with above complains. was found to have ESBL klebsiella UTI. Per urology recommended to have washburn, proscar/finasteride initiated as well. Received PICC and needs to continue with IV meropenem 10 more days per ID recommendation. Will needs DC washburn 1 week and follow up with urology as outpatient. After completing his acute treatment was transferred to skilled status at [facility]      Past Medical History:          Diagnosis Date    Anemia     CKD (chronic kidney disease)     [facility] mellitus ([facility]     Has a tremor     Hyperlipidemia     Hypokalemia     Intellectual disability     Kidney stone     Lennox-Gastaut syndrome (HCC)     Paralytic ileus ([facility]     Thyroid disease     Venous thrombosis and embolism        Past Surgical History:          Procedure Laterality Date    LITHOTRIPSY  07/30/2020       Medications Prior to Admission:      Prior to Admission medications    Medication Sig Start Date End Date Taking? Authorizing Provider   finasteride (PROSCAR) 5 MG tablet Take 1 tablet by mouth daily 1/28/21  Yes Mily Kerr MD   tamsulosin [facility]) 0.4 MG capsule Take 1 capsule by mouth daily 1/28/21  Yes Mily Kerr MD   loratadine (CLARITIN) 10 MG tablet Take 10 mg by mouth daily as needed    Yes Historical Provider, MD   diazePAM (DIASTAT) 10 MG GEL Place 10 mg rectally once as needed.    Yes Historical Provider, MD   ondansetron (ZOFRAN) 4 MG tablet Take 4 mg by mouth every 8 hours as needed for Nausea or Vomiting   Yes Historical Provider, MD   busPIRone (BUSPAR) 5 MG tablet Take 5 mg by mouth 2 times daily   Yes Historical Provider, MD   acetaminophen (TYLENOL) 325 MG tablet Take 650 mg by mouth every 4 hours as needed for Pain or Fever (pain or temp greater than 100)   Yes Historical Provider, MD   guaiFENesin (MUCINEX) 600 MG extended release tablet Take 600 mg by mouth 2 times daily as needed for Congestion For chest congestion   Yes Historical Provider, MD   calcium carbonate (TUMS EX) 750 MG chewable tablet Take 1,000 mg by mouth every 4 hours as needed for Heartburn    Yes Historical Provider, MD   ferrous sulfate (IRON 325) 325 (65 Fe) MG tablet Take 1 tablet by mouth 2 times daily (with meals) 8/3/20  Yes Leslie Blevins MD   baclofen (LIORESAL) 10 MG tablet Take 10 mg by mouth every morning   Yes Historical Provider, MD   divalproex (DEPAKOTE ER) 250 MG extended release tablet Take 750 mg by mouth 2 times daily    Yes Historical Provider, MD   divalproex (DEPAKOTE ER) 500 MG extended release tablet Take 500 mg by mouth nightly   Yes Historical Provider, MD   Bisacodyl (DULCOLAX RE) Place 1 Units rectally daily as needed (constipation)   Yes Historical Provider, MD   lamoTRIgine (LAMICTAL) 200 MG tablet Take 200 mg by mouth 2 times daily   Yes Historical Provider, MD   levothyroxine (SYNTHROID) 50 MCG tablet Take 50 mcg by mouth Daily   Yes Historical Provider, MD   meropenem (MERREM) infusion Infuse 1,000 mg intravenously every 8 hours for 10 days Compound per protocol. 1/28/21 2/7/21  Colin Whyte MD   gabapentin (NEURONTIN) 100 MG capsule Take 1 capsule by mouth 3 times daily for 30 days. 11/4/20 1/22/21  Roxanne Long MD       Allergies:  Cefazolin, Celontin [methsuximide], Duricef [cefadroxil], Simvastatin, Tramadol, and Vicodin [hydrocodone-acetaminophen]    Social History:      The patient currently lives     TOBACCO:   reports that he has never smoked. He has never used smokeless tobacco.  ETOH:   reports no history of alcohol use. Family History:       Reviewed in detail and negative for DM, CAD, Cancer, CVA. :    History reviewed. No pertinent family history. REVIEW OF SYSTEMS:   Pertinent positives as noted in the HPI.  All other systems reviewed Prophylaxis:   Diet: DIET GENERAL;  Dietary Nutrition Supplements: Diabetic Oral Supplement  Code Status: Prior    PT/OT Eval Status:     Dispo - UTI/fever-  multidrug resistant klebsiella- continue with meropenem, ID on case. PICC done for prolong atbs course    Acute/chronic urinary retention- washburn in place. Added flomax/finasteride per  urology service   Constipation- added lactulose, follow up clinically  MRDD- supportive care, group home resident    Medically stable for skilled admission at [facility]    Thank you Juan R Fagan MD for the opportunity to be involved in this patient's care. If you have any questions or concerns please feel free to contact me.

## 2021-01-29 NOTE — PROGRESS NOTES
Pt took his night time meds for this nurse tonight. Pt alert and oriented. Pt talked to his mom tonight and is now watching a movie. Will continue to monitor pt.    Electronically signed by Jena Dexter RN on 1/28/2021 at 10:53 PM

## 2021-01-29 NOTE — PROGRESS NOTES
Pt refused to be checked and turned, became verbally aggressive and demanded staff leave his room. Will continue to monitor.

## 2021-01-29 NOTE — PROGRESS NOTES
Pt awakened from nap to administer medications, pt refused to be turned or checked for stool, stated, \"I'm clean damnit. \" then demanded staff leave his room.

## 2021-01-29 NOTE — PROGRESS NOTES
Pt refused to be checked and turned, became increasingly agitated and started raising his hands threatening to hit staff. Left in bed with call light in reach and bed alarm on.

## 2021-01-29 NOTE — PROGRESS NOTES
Comprehensive Nutrition Assessment    Type and Reason for Visit:  Initial(subacute admit for treatment of UTI.)    Nutrition Recommendations/Plan: Continue Current Diet, Continue Oral Nutrition Supplement    Nutrition Assessment:  Meets criteria for severe malnutrition in chronic setting  2/2 weight loss and refusing meals. He is at risk for additional compromise with increased demand for nutrients for wound. Continue ONS to help meet estimated nutrient needs. Staff is offering pt his favorite foods, but he still refuses to eat when they arrive at meals. Pt's mom is bring some food to encourage intake. Follow for LOS. Malnutrition Assessment:  Malnutrition Status:  Severe malnutrition    Context:  Chronic Illness     Findings of the 6 clinical characteristics of malnutrition:  Energy Intake:  7 - 75% or less estimated energy requirements for 1 month or longer(per pt's mother he has been refusing to eat for > 3 months.)  Weight Loss:  7 - 20% over 1 year     Body Fat Loss:  Unable to assess     Muscle Mass Loss:  Unable to assess    Fluid Accumulation:  No significant fluid accumulation     Strength:  Not Performed    Estimated Daily Nutrient Needs:  Energy (kcal):  3875-7584 @ 19-21 kcal/kg; Weight Used for Energy Requirements:  Current     Protein (g):  75-90 @ 1-1.2 gr/kg; Weight Used for Protein Requirements:  Ideal        Fluid (ml/day):  2250 ml/d; Method Used for Fluid Requirements:  ml/Kg      Nutrition Related Findings:  Trace, pitting BLE edema. Last BM 1/28. Glu 117,94.   PMH:  CKD, HTN, intellectual disability, DM II, tremors      Wounds:  Pressure Injury, Stage II(buttock)       Current Nutrition Therapies:    DIET GENERAL;  Dietary Nutrition Supplements: Diabetic Oral Supplement    Anthropometric Measures:  · Height: 5' 10\" (177.8 cm)  · Current Body Weight: 206 lb 9.1 oz (93.7 kg)(taken while back of bed elevated)   · Admission Body Weight: 214 lb 8.1 oz (97.3 kg)(bed wt)    · Usual Body Weight: 259 lb 14.8 oz (117.9 kg)(2/11/20 chart)     · Ideal Body Weight: 166 lbs; % Ideal Body Weight 124.4 %   · BMI: 29.6  · BMI Categories: Overweight (BMI 25.0-29. 9)       Nutrition Diagnosis:   · Severe malnutrition, In context of chronic, non-illness related related to cognitive or neurological impairment, early satiety as evidenced by poor intake prior to admission, intake 0-25%, weight loss greater than or equal to 20% in 1 year      Nutrition Interventions:   Food and/or Nutrient Delivery:  Continue Current Diet, Continue Oral Nutrition Supplement  Nutrition Education/Counseling:  No recommendation at this time   Coordination of Nutrition Care:  Continue to monitor while inpatient, Interdisciplinary Rounds    Goals:  Intake > 50% meals &  100% ONS. Weight > 93 kg. Nutrition Monitoring and Evaluation:   Behavioral-Environmental Outcomes:  Knowledge or Skill   Food/Nutrient Intake Outcomes:  Food and Nutrient Intake, Supplement Intake  Physical Signs/Symptoms Outcomes:  Biochemical Data, GI Status, Meal Time Behavior, Skin, Weight     Discharge Planning:     Too soon to determine     Electronically signed by Karlene Howard RD, LD on 1/29/21 at 1:29 PM EST

## 2021-01-29 NOTE — PROGRESS NOTES
Pt changed and repositioned in bed. Incontinent of a smear of mucus. Home DVD player playing and fritos opened for a snack. Will continue to monitor.

## 2021-01-29 NOTE — PLAN OF CARE
Nutrition Problem #1: Severe malnutrition, In context of chronic, non-illness related  Intervention: Food and/or Nutrient Delivery: Continue Current Diet, Continue Oral Nutrition Supplement  Nutritional Goals: Intake > 50% meals &  100% ONS. Weight > 93 kg.

## 2021-01-29 NOTE — PROGRESS NOTES
1/29/21 pt states his back and bottom hurt but could not delineate pain number with MRDD coginitive deficits. Unable to redirect or educate to roll on side for pressure relief this date.     Orientation  Orientation  Overall Orientation Status: Impaired  Orientation Level: Oriented to person;Oriented to place(partially oriented to situation and need for antibiotics)    Social/Functional History  Social/Functional History  Lives With: (facility)  Type of Home: Facility  Home Layout: One level  Home Access: Level entry  Home Equipment: (wheelchair at facility- pt could not explain type of wheelchair)  ADL Assistance: (dependent for all care)  Leisure & Hobbies: likes to visit girl friend who also lives at facility  Objective     Observation/Palpation  Observation: PICC, washburn, per avatar L ischial grade 2 wound, speaking loudly- yelling at times- refusing to particpate even with mulitple attempts to redirect and eduacate; HOB 45 deg  supien with LE ext rot and mild flexion    PROM RLE (degrees)  RLE General PROM: unable to assess due to pt refusal  AROM RLE (degrees)  RLE General AROM: unable to assess due to pt refusal  PROM LLE (degrees)  LLE General PROM: unable to assess due to pt refusal  AROM LLE (degrees)  LLE General AROM: unable to assess due to pt refusal  PROM RUE (degrees)  RUE General PROM: unable to assess due to pt refusal  AROM RUE (degrees)  RUE General AROM: unable to assess due to pt refusal  PROM LUE (degrees)  LUE General PROM: unable to assess due to pt refusal  AROM LUE (degrees)  LUE General AROM: unable to assess due to pt refusal  Strength RLE  Comment: unable to assess due to pt refusal  Strength LLE  Comment: unable to assess due to pt refusal  Strength RUE  Comment: unable to assess due to pt refusal  Strength LUE  Comment: unable to assess due to pt refusal     Sensation  Overall Sensation Status: (unable to assess due to pt refusal)  Bed mobility  Comment: unable to assess due to pt refusal; per RN often max A to dependent of 2 person with pt refusal to roll for pericare  Transfers  Comment: unable to assess due to pt refusal; per RN attempted rene steady x1 on medical unit with Max A /dependent x 2 person and pt screaming not wanting to get OOB/stand on feet- last week  Ambulation  Ambulation?: (not appropriate w/c bound per history)  Stairs/Curb  Stairs?: No(w/c bound)  Wheelchair Activities  Wheelchair Type: (pt could not state, likely tilt in space or w/c with custom seating, lives in a facility)     Balance  Comments: unable to assess due to pt refusal  Exercises  Comments: unable to assess due to pt refusal to move LE with or withotu assistance 1/29/21     Assessment   Body structures, Functions, Activity limitations: Decreased functional mobility ; Decreased balance;Decreased cognition;Decreased endurance;Decreased ROM; Decreased posture; Increased pain  Assessment: (pt not appropriate for OT intervention- OT eval deferred per Therapy Manager)  Treatment Diagnosis: UTI with IV needs with assumed weakness and decrease functional mobility transfers/ wheelchair mobility  Prognosis: Fair(to PLOF of function at NH)  REQUIRES PT FOLLOW UP: Yes  Activity Tolerance  Activity Tolerance: Patient limited by cognitive status;Treatment limited secondary to agitation  Activity Tolerance: Pt with MRDD, very verbal and adamently refusing participation wiht any PT or OT activities as eval on 1/29/21  PT Equipment Recommendations  Other: TBD     Discharge Recommendations:  ECF with PT, Continue to assess pending progress      Plan   Plan  Times per week: 2-3 x week  Plan weeks: length of IV's approx 2 weeks  Current Treatment Recommendations: Transfer Training, Wheelchair Mobility Training, Functional Mobility Training, Balance Training, Strengthening, Home Exercise Program, Safety Education & Training, Equipment Evaluation, Education, & procurement, Patient/Caregiver Education & Training  Safety Devices  Type of devices:  All fall risk precautions in place, Bed alarm in place, Call light within reach    Goals  Short term goals  Time Frame for Short term goals: same as LTG as pt refusing and in subacute rehab for IV needs  Long term goals  Time Frame for Long term goals : 2 weeks or length of IV needs  Long term goal 1: Pt participating OOB with lift and Max A of 2 for 2/7 days  Long term goal 2: Pt propelling w/c 30 ft with UE's and /or LE's <= Min A of 1, at least 1x per week  Long term goal 3: Pt rolling with Min A of 1 to allow better postitioning for pressure relief of L ischial wound  Long term goal 4: Pt to increase endurance by sitting up in chair for 2 hours at least every other day per PT and RN reports  Patient Goals   Patient goals : Pt would not delineate any goals except to deny participation       Therapy Time   Individual Concurrent Group Co-treatment   Time In  850         Time Out  950         Minutes  [address] [place] [address]

## 2021-01-30 LAB
BACTERIA: ABNORMAL /HPF
BILIRUBIN URINE: NEGATIVE
BLOOD, URINE: ABNORMAL
CLARITY: ABNORMAL
COLOR: YELLOW
EPITHELIAL CELLS, UA: ABNORMAL /HPF
GLUCOSE URINE: NEGATIVE MG/DL
KETONES, URINE: NEGATIVE MG/DL
LEUKOCYTE ESTERASE, URINE: ABNORMAL
NITRITE, URINE: NEGATIVE
PH UA: 6.5 (ref 5–9)
PROTEIN UA: 100 MG/DL
RBC UA: ABNORMAL /HPF (ref 0–2)
SPECIFIC GRAVITY UA: >=1.03 (ref 1–1.03)
URINE REFLEX TO CULTURE: YES
UROBILINOGEN, URINE: 0.2 E.U./DL
WBC UA: >100 /HPF (ref 0–5)
YEAST: PRESENT /HPF

## 2021-01-30 PROCEDURE — 6360000002 HC RX W HCPCS: Performed by: INTERNAL MEDICINE

## 2021-01-30 PROCEDURE — 81001 URINALYSIS AUTO W/SCOPE: CPT

## 2021-01-30 PROCEDURE — 82274 ASSAY TEST FOR BLOOD FECAL: CPT

## 2021-01-30 PROCEDURE — 87086 URINE CULTURE/COLONY COUNT: CPT

## 2021-01-30 PROCEDURE — 6370000000 HC RX 637 (ALT 250 FOR IP): Performed by: INTERNAL MEDICINE

## 2021-01-30 PROCEDURE — 2580000003 HC RX 258: Performed by: INTERNAL MEDICINE

## 2021-01-30 PROCEDURE — 1200000002 HC SEMI PRIVATE SWING BED

## 2021-01-30 RX ADMIN — POLYETHYLENE GLYCOL 3350 17 G: 17 POWDER, FOR SOLUTION ORAL at 21:58

## 2021-01-30 RX ADMIN — MEROPENEM 1000 MG: 1 INJECTION, POWDER, FOR SOLUTION INTRAVENOUS at 21:58

## 2021-01-30 RX ADMIN — LAMOTRIGINE 200 MG: 100 TABLET ORAL at 09:37

## 2021-01-30 RX ADMIN — LAMOTRIGINE 200 MG: 100 TABLET ORAL at 21:58

## 2021-01-30 RX ADMIN — Medication 10 ML: at 21:59

## 2021-01-30 RX ADMIN — BACLOFEN 10 MG: 10 TABLET ORAL at 09:37

## 2021-01-30 RX ADMIN — GABAPENTIN 100 MG: 100 CAPSULE ORAL at 09:37

## 2021-01-30 RX ADMIN — Medication 3 ML: at 06:02

## 2021-01-30 RX ADMIN — SENNOSIDES 17.2 MG: 8.6 TABLET, FILM COATED ORAL at 09:37

## 2021-01-30 RX ADMIN — TAMSULOSIN HYDROCHLORIDE 0.4 MG: 0.4 CAPSULE ORAL at 09:37

## 2021-01-30 RX ADMIN — LEVOTHYROXINE SODIUM 50 MCG: 0.03 TABLET ORAL at 06:01

## 2021-01-30 RX ADMIN — DIVALPROEX SODIUM 750 MG: 250 TABLET, FILM COATED, EXTENDED RELEASE ORAL at 21:58

## 2021-01-30 RX ADMIN — SENNOSIDES 17.2 MG: 8.6 TABLET, FILM COATED ORAL at 21:57

## 2021-01-30 RX ADMIN — BUSPIRONE HYDROCHLORIDE 5 MG: 5 TABLET ORAL at 09:37

## 2021-01-30 RX ADMIN — FINASTERIDE 5 MG: 5 TABLET, FILM COATED ORAL at 09:37

## 2021-01-30 RX ADMIN — BUSPIRONE HYDROCHLORIDE 5 MG: 5 TABLET ORAL at 21:58

## 2021-01-30 RX ADMIN — MEROPENEM 1000 MG: 1 INJECTION, POWDER, FOR SOLUTION INTRAVENOUS at 06:01

## 2021-01-30 RX ADMIN — DIVALPROEX SODIUM 250 MG: 250 TABLET, FILM COATED, EXTENDED RELEASE ORAL at 09:37

## 2021-01-30 RX ADMIN — Medication 10 ML: at 09:38

## 2021-01-30 RX ADMIN — DIVALPROEX SODIUM 250 MG: 250 TABLET, FILM COATED, EXTENDED RELEASE ORAL at 22:01

## 2021-01-30 RX ADMIN — GABAPENTIN 100 MG: 100 CAPSULE ORAL at 13:55

## 2021-01-30 RX ADMIN — MEROPENEM 1000 MG: 1 INJECTION, POWDER, FOR SOLUTION INTRAVENOUS at 13:55

## 2021-01-30 RX ADMIN — Medication 3 ML: at 22:00

## 2021-01-30 RX ADMIN — LACTULOSE 20 G: 20 SOLUTION ORAL at 21:57

## 2021-01-30 RX ADMIN — GABAPENTIN 100 MG: 100 CAPSULE ORAL at 21:58

## 2021-01-30 NOTE — PROGRESS NOTES
Psychosocial Assessment     Physical Appearance: Average    Dress: Neat    Hygiene: Good    Speech: Coherent    Affect/Mood: Appropriate    Alert and Orientated: Person and Place    Behavior: Cooperative    Resides:Patient resides at [address] in [place]     DME: [address]: patient's mother whom is also legal guardian     History of Mental Health: Patient has mental limitations and reported to have a history of mood disorders. Patient is reported to easily become agitated     Food: Food provided at group home     Medication: Medication administered at [address] [place] [address] Street: [address] [facility] provides transportation     DC Plan: Patient and mother report plan is for patient to return to group home upon completing IV antibiotics     Plan for transition of care is related to the following treatment goals: Patient reports that goal is to return to group home     Patient's mother was provided with choice of provider, and agrees with the discharge plan: on-going     Patient's mother was provided with choice of all post acute providers and agrees with the discharge plan: on-going    Patient was to DC to [facility] in [place] to receive IV antibiotics however, it was noted that [facility] no longer had a bed available due to unseen circumstances. Patient's mother is legal guardian and it was noted that she desired to remain at [facility] and be admitted to subacute unit for skilled services to receive IV antibiotics. Plan is for patient to return to [facility] [place] in [place] upon completing IV antibioitcs.      Electronically signed by JOHANN Moses on 1/29/2021 at 7:01 PM

## 2021-01-30 NOTE — PROGRESS NOTES
Pt requested his beard and mustache be shaved, states his mom does not want him to keep them-- she thinks he is more handsome without them he jokingly said. Pt's martinez and mustache shaved with electric razor per request. Pt also wanted his hair cut, I told him that I am not a lockett and he said, \"neither is my mom, but she cuts it. \" Informed pt that I thought he needed to talk to his mom before getting a hair cut and he became increasingly agitated stating he was a \"grown ass man and can get a hair cut if he wants. \" tips of pt hair trimmed with razor to appease him. Left in bed with CD from home playing. Will continue to monitor.

## 2021-01-31 LAB — HEMOCCULT STL QL: NORMAL

## 2021-01-31 PROCEDURE — 2580000003 HC RX 258: Performed by: INTERNAL MEDICINE

## 2021-01-31 PROCEDURE — 6370000000 HC RX 637 (ALT 250 FOR IP): Performed by: INTERNAL MEDICINE

## 2021-01-31 PROCEDURE — 1200000002 HC SEMI PRIVATE SWING BED

## 2021-01-31 PROCEDURE — 6360000002 HC RX W HCPCS: Performed by: INTERNAL MEDICINE

## 2021-01-31 RX ORDER — FLUCONAZOLE 2 MG/ML
100 INJECTION, SOLUTION INTRAVENOUS EVERY 24 HOURS
Status: DISCONTINUED | OUTPATIENT
Start: 2021-01-31 | End: 2021-02-04

## 2021-01-31 RX ADMIN — LEVOTHYROXINE SODIUM 50 MCG: 0.03 TABLET ORAL at 05:08

## 2021-01-31 RX ADMIN — LAMOTRIGINE 200 MG: 100 TABLET ORAL at 09:34

## 2021-01-31 RX ADMIN — TAMSULOSIN HYDROCHLORIDE 0.4 MG: 0.4 CAPSULE ORAL at 09:34

## 2021-01-31 RX ADMIN — FLUCONAZOLE 100 MG: 400 INJECTION, SOLUTION INTRAVENOUS at 14:21

## 2021-01-31 RX ADMIN — POLYETHYLENE GLYCOL 3350 17 G: 17 POWDER, FOR SOLUTION ORAL at 20:24

## 2021-01-31 RX ADMIN — GABAPENTIN 100 MG: 100 CAPSULE ORAL at 14:21

## 2021-01-31 RX ADMIN — LAMOTRIGINE 200 MG: 100 TABLET ORAL at 20:25

## 2021-01-31 RX ADMIN — MEROPENEM 1000 MG: 1 INJECTION, POWDER, FOR SOLUTION INTRAVENOUS at 05:07

## 2021-01-31 RX ADMIN — GABAPENTIN 100 MG: 100 CAPSULE ORAL at 20:26

## 2021-01-31 RX ADMIN — FINASTERIDE 5 MG: 5 TABLET, FILM COATED ORAL at 09:34

## 2021-01-31 RX ADMIN — Medication 3 ML: at 05:08

## 2021-01-31 RX ADMIN — LACTULOSE 20 G: 20 SOLUTION ORAL at 20:24

## 2021-01-31 RX ADMIN — DIVALPROEX SODIUM 250 MG: 250 TABLET, FILM COATED, EXTENDED RELEASE ORAL at 20:30

## 2021-01-31 RX ADMIN — GABAPENTIN 100 MG: 100 CAPSULE ORAL at 09:34

## 2021-01-31 RX ADMIN — BACLOFEN 10 MG: 10 TABLET ORAL at 09:34

## 2021-01-31 RX ADMIN — Medication 10 ML: at 20:24

## 2021-01-31 RX ADMIN — MEROPENEM 1000 MG: 1 INJECTION, POWDER, FOR SOLUTION INTRAVENOUS at 14:21

## 2021-01-31 RX ADMIN — SENNOSIDES 17.2 MG: 8.6 TABLET, FILM COATED ORAL at 09:34

## 2021-01-31 RX ADMIN — MEROPENEM 1000 MG: 1 INJECTION, POWDER, FOR SOLUTION INTRAVENOUS at 21:33

## 2021-01-31 RX ADMIN — DIVALPROEX SODIUM 250 MG: 250 TABLET, FILM COATED, EXTENDED RELEASE ORAL at 09:34

## 2021-01-31 RX ADMIN — BUSPIRONE HYDROCHLORIDE 5 MG: 5 TABLET ORAL at 20:30

## 2021-01-31 RX ADMIN — BUSPIRONE HYDROCHLORIDE 5 MG: 5 TABLET ORAL at 09:34

## 2021-01-31 RX ADMIN — Medication 10 ML: at 09:35

## 2021-01-31 RX ADMIN — SENNOSIDES 17.2 MG: 8.6 TABLET, FILM COATED ORAL at 20:25

## 2021-01-31 RX ADMIN — DIVALPROEX SODIUM 750 MG: 250 TABLET, FILM COATED, EXTENDED RELEASE ORAL at 20:25

## 2021-01-31 RX ADMIN — POLYETHYLENE GLYCOL 3350 17 G: 17 POWDER, FOR SOLUTION ORAL at 09:34

## 2021-01-31 RX ADMIN — LACTULOSE 20 G: 20 SOLUTION ORAL at 09:34

## 2021-01-31 NOTE — PROGRESS NOTES
Pt refused to be checked and turned, became verbally aggressive and demanded staff leave him alone and quit asking because it makes him nervous. Will continue to monitor.

## 2021-01-31 NOTE — PROGRESS NOTES
Hospitalist Progress Note      PCP: Juan R Fagan MD    Date of Admission: 1/28/2021    Chief Complaint: weakness    Subjective: pt looks comfortable     Medications:  Reviewed    Infusion Medications   Scheduled Medications    fluconazole  100 mg Intravenous Q24H    baclofen  10 mg Oral QAM    busPIRone  5 mg Oral BID    divalproex  750 mg Oral Nightly    divalproex  250 mg Oral BID    finasteride  5 mg Oral Daily    gabapentin  100 mg Oral TID    lactulose  20 g Oral BID    lamoTRIgine  200 mg Oral BID    levothyroxine  50 mcg Oral Daily    meropenem  1,000 mg Intravenous Q8H    polyethylene glycol  17 g Oral BID    senna  2 tablet Oral BID    sodium chloride flush  3 mL Intravenous Q8H    sodium chloride flush  10 mL Intravenous 2 times per day    tamsulosin  0.4 mg Oral Daily     PRN Meds: acetaminophen, bisacodyl, ondansetron, sodium chloride flush      Intake/Output Summary (Last 24 hours) at 1/31/2021 0832  Last data filed at 1/31/2021 0703  Gross per 24 hour   Intake 376 ml   Output 1300 ml   Net -924 ml       Exam:    /75   Pulse 90   Temp 97.7 °F (36.5 °C)   Resp 18   Ht 5' 10\" (1.778 m)   SpO2 100%   BMI 30.76 kg/m²     General appearance: No apparent distress, appears stated age and partially cooperative. HEENT: Pupils equal, round, and reactive to light. Conjunctivae/corneas clear. Neck: Supple, with full range of motion. No jugular venous distention. Trachea midline. Respiratory:  Normal respiratory effort. Clear to auscultation, bilaterally without Rales/Wheezes/Rhonchi. Cardiovascular: Regular rate and rhythm with normal S1/S2 without murmurs, rubs or gallops. Abdomen: Soft, non-tender, non-distended with normal bowel sounds. Musculoskeletal: No clubbing, cyanosis or edema bilaterally. Skin: Skin color, texture, turgor normal.  No rashes or lesions. Neurologic:  Neurovascularly intact without any focal sensory/motor deficits.   Psychiatric: partially Alert and oriented,   Capillary Refill: Brisk,< 3 seconds   Peripheral Pulses: +2 palpable, equal bilaterally       Labs:   No results for input(s): WBC, HGB, HCT, PLT in the last 72 hours. No results for input(s): NA, K, CL, CO2, BUN, CREATININE, CALCIUM, PHOS in the last 72 hours. Invalid input(s): MAGNES  No results for input(s): AST, ALT, BILIDIR, BILITOT, ALKPHOS in the last 72 hours. No results for input(s): INR in the last 72 hours. No results for input(s): Lucas Fling in the last 72 hours. Urinalysis:      Lab Results   Component Value Date    NITRU Negative 01/30/2021    WBCUA >100 01/30/2021    BACTERIA RARE 01/30/2021    RBCUA 10-20 01/30/2021    BLOODU Moderate 01/30/2021    SPECGRAV >=1.030 01/30/2021    GLUCOSEU Negative 01/30/2021       Radiology:  No orders to display           Assessment/Plan:    Active Hospital Problems    Diagnosis Date Noted    Severe malnutrition ([facility] [address] [E43] 01/29/2021    UTI (urinary tract infection) [N39.0] 01/28/2021         DVT Prophylaxis:   Diet: DIET GENERAL;  Dietary Nutrition Supplements: Diabetic Oral Supplement  Code Status: Prior    PT/OT Eval Status:     Dispo - UTI--  multidrug resistant klebsiella- continue with meropenem, add diflucan according to the last UA,  ID on case.    PICC done for prolong atbs course    Acute/chronic urinary retention- washburn in place. continue with flomax/finasteride per  urology service   Constipation- added lactulose, follow up clinically  MRDD- supportive care, group home resident    Medically stable for skilled admission at [facility]      Electronically signed by Bruna Samayoa MD on 1/31/2021 at 8:32 AM

## 2021-02-01 LAB
ANION GAP SERPL CALCULATED.3IONS-SCNC: 12 MEQ/L (ref 9–15)
BASOPHILS ABSOLUTE: 0 K/UL (ref 0–0.2)
BASOPHILS RELATIVE PERCENT: 0.4 %
BUN BLDV-MCNC: 13 MG/DL (ref 6–20)
CALCIUM SERPL-MCNC: 9.6 MG/DL (ref 8.5–9.9)
CHLORIDE BLD-SCNC: 109 MEQ/L (ref 95–107)
CO2: 23 MEQ/L (ref 20–31)
CREAT SERPL-MCNC: 0.61 MG/DL (ref 0.7–1.2)
EOSINOPHILS ABSOLUTE: 0.3 K/UL (ref 0–0.7)
EOSINOPHILS RELATIVE PERCENT: 5.1 %
GFR AFRICAN AMERICAN: >60
GFR NON-AFRICAN AMERICAN: >60
GLUCOSE BLD-MCNC: 96 MG/DL (ref 70–99)
HCT VFR BLD CALC: 34.8 % (ref 42–52)
HEMOGLOBIN: 11.6 G/DL (ref 14–18)
LYMPHOCYTES ABSOLUTE: 1.8 K/UL (ref 1–4.8)
LYMPHOCYTES RELATIVE PERCENT: 30.9 %
MCH RBC QN AUTO: 29 PG (ref 27–31.3)
MCHC RBC AUTO-ENTMCNC: 33.3 % (ref 33–37)
MCV RBC AUTO: 86.9 FL (ref 80–100)
MONOCYTES ABSOLUTE: 0.4 K/UL (ref 0.2–0.8)
MONOCYTES RELATIVE PERCENT: 7.4 %
NEUTROPHILS ABSOLUTE: 3.2 K/UL (ref 1.4–6.5)
NEUTROPHILS RELATIVE PERCENT: 56.2 %
ORGANISM: ABNORMAL
PDW BLD-RTO: 15.3 % (ref 11.5–14.5)
PLATELET # BLD: 242 K/UL (ref 130–400)
POTASSIUM SERPL-SCNC: 3.9 MEQ/L (ref 3.4–4.9)
RBC # BLD: 4 M/UL (ref 4.7–6.1)
SODIUM BLD-SCNC: 144 MEQ/L (ref 135–144)
URINE CULTURE, ROUTINE: ABNORMAL
WBC # BLD: 5.8 K/UL (ref 4.8–10.8)

## 2021-02-01 PROCEDURE — 1200000002 HC SEMI PRIVATE SWING BED

## 2021-02-01 PROCEDURE — 85025 COMPLETE CBC W/AUTO DIFF WBC: CPT

## 2021-02-01 PROCEDURE — 6370000000 HC RX 637 (ALT 250 FOR IP): Performed by: INTERNAL MEDICINE

## 2021-02-01 PROCEDURE — 2580000003 HC RX 258: Performed by: INTERNAL MEDICINE

## 2021-02-01 PROCEDURE — 80048 BASIC METABOLIC PNL TOTAL CA: CPT

## 2021-02-01 PROCEDURE — 6360000002 HC RX W HCPCS: Performed by: INTERNAL MEDICINE

## 2021-02-01 RX ADMIN — MEROPENEM 1000 MG: 1 INJECTION, POWDER, FOR SOLUTION INTRAVENOUS at 22:04

## 2021-02-01 RX ADMIN — SENNOSIDES 17.2 MG: 8.6 TABLET, FILM COATED ORAL at 20:23

## 2021-02-01 RX ADMIN — MEROPENEM 1000 MG: 1 INJECTION, POWDER, FOR SOLUTION INTRAVENOUS at 14:14

## 2021-02-01 RX ADMIN — GABAPENTIN 100 MG: 100 CAPSULE ORAL at 14:15

## 2021-02-01 RX ADMIN — Medication 10 ML: at 14:33

## 2021-02-01 RX ADMIN — LACTULOSE 20 G: 20 SOLUTION ORAL at 20:23

## 2021-02-01 RX ADMIN — TAMSULOSIN HYDROCHLORIDE 0.4 MG: 0.4 CAPSULE ORAL at 14:14

## 2021-02-01 RX ADMIN — MEROPENEM 1000 MG: 1 INJECTION, POWDER, FOR SOLUTION INTRAVENOUS at 05:09

## 2021-02-01 RX ADMIN — LAMOTRIGINE 200 MG: 100 TABLET ORAL at 20:24

## 2021-02-01 RX ADMIN — Medication 10 ML: at 20:22

## 2021-02-01 RX ADMIN — BUSPIRONE HYDROCHLORIDE 5 MG: 5 TABLET ORAL at 14:15

## 2021-02-01 RX ADMIN — BACLOFEN 10 MG: 10 TABLET ORAL at 14:14

## 2021-02-01 RX ADMIN — LEVOTHYROXINE SODIUM 50 MCG: 0.03 TABLET ORAL at 05:09

## 2021-02-01 RX ADMIN — DIVALPROEX SODIUM 250 MG: 250 TABLET, FILM COATED, EXTENDED RELEASE ORAL at 14:22

## 2021-02-01 RX ADMIN — POLYETHYLENE GLYCOL 3350 17 G: 17 POWDER, FOR SOLUTION ORAL at 20:23

## 2021-02-01 RX ADMIN — GABAPENTIN 100 MG: 100 CAPSULE ORAL at 20:23

## 2021-02-01 RX ADMIN — FLUCONAZOLE 100 MG: 400 INJECTION, SOLUTION INTRAVENOUS at 14:14

## 2021-02-01 RX ADMIN — DIVALPROEX SODIUM 250 MG: 250 TABLET, FILM COATED, EXTENDED RELEASE ORAL at 20:24

## 2021-02-01 RX ADMIN — DIVALPROEX SODIUM 750 MG: 250 TABLET, FILM COATED, EXTENDED RELEASE ORAL at 20:23

## 2021-02-01 RX ADMIN — BUSPIRONE HYDROCHLORIDE 5 MG: 5 TABLET ORAL at 20:23

## 2021-02-01 RX ADMIN — FINASTERIDE 5 MG: 5 TABLET, FILM COATED ORAL at 14:15

## 2021-02-01 ASSESSMENT — PAIN SCALES - GENERAL: PAINLEVEL_OUTOF10: 0

## 2021-02-01 ASSESSMENT — PAIN SCALES - WONG BAKER
WONGBAKER_NUMERICALRESPONSE: 0
WONGBAKER_NUMERICALRESPONSE: 0

## 2021-02-01 NOTE — PROGRESS NOTES
Physical Therapy   Subacute Daily Treatment Note  NAME: Roya Roy  : 1970  MRN: [id number]    Date of Service: 2021    Patient Diagnosis(es):   Patient Active Problem List    Diagnosis Date Noted    Severe malnutrition ([address] 2021    UTI (urinary tract infection) 2021    Urinary tract infection due to ESBL Klebsiella 2021    Functional urinary incontinence 01/10/2021    Gastroesophageal reflux disease without esophagitis 01/10/2021    Iron deficiency anemia 01/10/2021    ESBL (extended spectrum beta-lactamase) producing bacteria infection 2020    Recurrent UTI 2020    Epileptic seizure ([address] 2020    Hypothyroidism 2020    HTN (hypertension) 2020    Chronic pain 2020    Small bowel obstruction ([address] 2020    Sepsis ([address] 2020    Moderate intellectual disabilities 2003       Past Medical History:   Diagnosis Date    Anemia     CKD (chronic kidney disease)     Diabetes mellitus ([address]     Has a tremor     Hyperlipidemia     Hypokalemia     Intellectual disability     Kidney stone     Lennox-Gastaut syndrome ([address]     Paralytic ileus ([address]     Thyroid disease     Venous thrombosis and embolism      Past Surgical History:   Procedure Laterality Date    LITHOTRIPSY  2020       Restrictions  Restrictions/Precautions  Restrictions/Precautions: Fall Risk(pt can be verbally and physically resistive with MRDD behaviors)  Subjective    \"I don't want to do that right now? \" pt yelling. Objective    Pt refused PM attempts for treatment. Refused OOB or LE EMILIANO. Pt also refusing to receive assist of RN for pt care at that time. Assessment    Pt continues to refuse therapy with behavioral components of MRDD.            Discharge Recommendations:  ECF with PT, Continue to assess pending progress    Goals  Short term goals  Time Frame for Short term goals: same as LTG as pt refusing and in subacute rehab for IV needs  Long term goals  Time Frame for Long term goals : 2 weeks or length of IV needs  Long term goal 1: Pt participating OOB with lift and Max A of 2 for 2/7 days  Long term goal 2: Pt propelling w/c 30 ft with UE's and /or LE's <= Min A of 1, at least 1x per week  Long term goal 3: Pt rolling with Min A of 1 to allow better postitioning for pressure relief of L ischial wound  Long term goal 4: Pt to increase endurance by sitting up in chair for 2 hours at least every other day per PT and RN reports  Patient Goals   Patient goals : Pt would not delineate any goals except to deny participation    Plan    Plan  Times per week: 2-3 x week  Plan weeks: length of IV's approx 2 weeks  Current Treatment Recommendations: Transfer Training, Wheelchair Mobility Training, Functional Mobility Training, Balance Training, Strengthening, Home Exercise Program, Safety Education & Training, Equipment Evaluation, Education, & procurement, Patient/Caregiver Education & Training  Safety Devices  Type of devices:  All fall risk precautions in place, Bed alarm in place, Call light within reach     Therapy Time   Individual Concurrent Group Co-treatment   Time In           Time Out           Minutes  5 refusal                 Malu Paulson, [id number]

## 2021-02-01 NOTE — PROGRESS NOTES
Attempted to obtain VS on patient, patient refused. Patient stated he needed more sleep and was not going to do anything until he got more sleep. Bed in low position, call light in reach, bed alarm on.

## 2021-02-01 NOTE — PROGRESS NOTES
Physical Therapy   Subacute Daily Treatment Note  NAME: Ashish Otoole  : 1970  MRN: [id number]    Date of Service: 2021    Patient Diagnosis(es):   Patient Active Problem List    Diagnosis Date Noted    Severe malnutrition ([address] 2021    UTI (urinary tract infection) 2021    Urinary tract infection due to ESBL Klebsiella 2021    Functional urinary incontinence 01/10/2021    Gastroesophageal reflux disease without esophagitis 01/10/2021    Iron deficiency anemia 01/10/2021    ESBL (extended spectrum beta-lactamase) producing bacteria infection 2020    Recurrent UTI 2020    Epileptic seizure ([address] 2020    Hypothyroidism 2020    HTN (hypertension) 2020    Chronic pain 2020    Small bowel obstruction ([address] 2020    Sepsis ([address] 2020    Moderate intellectual disabilities 2003       Past Medical History:   Diagnosis Date    Anemia     CKD (chronic kidney disease)     Diabetes mellitus ([address]     Has a tremor     Hyperlipidemia     Hypokalemia     Intellectual disability     Kidney stone     Lennox-Gastaut syndrome ([address]     Paralytic ileus ([address]     Thyroid disease     Venous thrombosis and embolism      Past Surgical History:   Procedure Laterality Date    LITHOTRIPSY  2020       Restrictions  Restrictions/Precautions  Restrictions/Precautions: Fall Risk(pt can be verbally and physically resistive with MRDD behaviors)     Objective    Attempt to see pt twice this AM at 915 and 1015. Pt sleeping both times. Pt with MRDD and behaviors- per RN pt insisted he was taking a nap and not to bother him. Pt had still not taken his meds for RN this AM.     Will attempt PT later this date or tomorrow as time allows and pt willing to participate. Assessment        Per RN pt has refused OOB since last week. Pt has not physically participated with PT as of yet.  Checking and attempting to tx pt 3x week per plan

## 2021-02-01 NOTE — PROGRESS NOTES
Chart reviewed. Patient continues to receive skilled therapies at [facility] for IV antibiotics. Infectious disease is following patient at [facility]. This  contacted patient's mother Kennedi Brooke via phone to arrange care conference. It is worth noting that Kennedi Brooke is patient's legal guardian. Care conference scheduled for this Wednesday 2/3/21. Kennedi Brooke reports that plan is for patient to return to [facility] in [place] upon completing IV antibiotics. SS to continue to follow.

## 2021-02-02 ENCOUNTER — APPOINTMENT (OUTPATIENT)
Dept: CT IMAGING | Age: 51
DRG: 689 | End: 2021-02-02
Attending: INTERNAL MEDICINE
Payer: MEDICARE

## 2021-02-02 PROCEDURE — 6360000002 HC RX W HCPCS: Performed by: INTERNAL MEDICINE

## 2021-02-02 PROCEDURE — 2580000003 HC RX 258: Performed by: INTERNAL MEDICINE

## 2021-02-02 PROCEDURE — 6370000000 HC RX 637 (ALT 250 FOR IP): Performed by: INTERNAL MEDICINE

## 2021-02-02 PROCEDURE — 1200000002 HC SEMI PRIVATE SWING BED

## 2021-02-02 PROCEDURE — 74176 CT ABD & PELVIS W/O CONTRAST: CPT

## 2021-02-02 PROCEDURE — 97530 THERAPEUTIC ACTIVITIES: CPT

## 2021-02-02 RX ORDER — KETOROLAC TROMETHAMINE 30 MG/ML
30 INJECTION, SOLUTION INTRAMUSCULAR; INTRAVENOUS EVERY 6 HOURS PRN
Status: DISCONTINUED | OUTPATIENT
Start: 2021-02-02 | End: 2021-02-02

## 2021-02-02 RX ADMIN — GABAPENTIN 100 MG: 100 CAPSULE ORAL at 14:07

## 2021-02-02 RX ADMIN — BUSPIRONE HYDROCHLORIDE 5 MG: 5 TABLET ORAL at 20:23

## 2021-02-02 RX ADMIN — SENNOSIDES 17.2 MG: 8.6 TABLET, FILM COATED ORAL at 20:23

## 2021-02-02 RX ADMIN — MEROPENEM 1000 MG: 1 INJECTION, POWDER, FOR SOLUTION INTRAVENOUS at 05:35

## 2021-02-02 RX ADMIN — FINASTERIDE 5 MG: 5 TABLET, FILM COATED ORAL at 08:51

## 2021-02-02 RX ADMIN — POLYETHYLENE GLYCOL 3350 17 G: 17 POWDER, FOR SOLUTION ORAL at 20:27

## 2021-02-02 RX ADMIN — TAMSULOSIN HYDROCHLORIDE 0.4 MG: 0.4 CAPSULE ORAL at 08:51

## 2021-02-02 RX ADMIN — LAMOTRIGINE 200 MG: 100 TABLET ORAL at 08:51

## 2021-02-02 RX ADMIN — DIVALPROEX SODIUM 750 MG: 250 TABLET, FILM COATED, EXTENDED RELEASE ORAL at 20:27

## 2021-02-02 RX ADMIN — MEROPENEM 1000 MG: 1 INJECTION, POWDER, FOR SOLUTION INTRAVENOUS at 22:39

## 2021-02-02 RX ADMIN — BACLOFEN 10 MG: 10 TABLET ORAL at 08:51

## 2021-02-02 RX ADMIN — LAMOTRIGINE 200 MG: 100 TABLET ORAL at 20:23

## 2021-02-02 RX ADMIN — DIVALPROEX SODIUM 250 MG: 250 TABLET, FILM COATED, EXTENDED RELEASE ORAL at 08:51

## 2021-02-02 RX ADMIN — GABAPENTIN 100 MG: 100 CAPSULE ORAL at 20:23

## 2021-02-02 RX ADMIN — BUSPIRONE HYDROCHLORIDE 5 MG: 5 TABLET ORAL at 08:51

## 2021-02-02 RX ADMIN — FLUCONAZOLE 100 MG: 400 INJECTION, SOLUTION INTRAVENOUS at 14:06

## 2021-02-02 RX ADMIN — Medication 10 ML: at 14:10

## 2021-02-02 RX ADMIN — LEVOTHYROXINE SODIUM 50 MCG: 0.03 TABLET ORAL at 05:35

## 2021-02-02 RX ADMIN — GABAPENTIN 100 MG: 100 CAPSULE ORAL at 08:51

## 2021-02-02 RX ADMIN — Medication 10 ML: at 20:28

## 2021-02-02 RX ADMIN — LACTULOSE 20 G: 20 SOLUTION ORAL at 20:27

## 2021-02-02 RX ADMIN — MEROPENEM 1000 MG: 1 INJECTION, POWDER, FOR SOLUTION INTRAVENOUS at 14:06

## 2021-02-02 ASSESSMENT — PAIN SCALES - WONG BAKER
WONGBAKER_NUMERICALRESPONSE: 0

## 2021-02-02 NOTE — PROGRESS NOTES
Hospitalist Progress Note      PCP: Jennifer Martinez MD    Date of Admission: 1/28/2021    Chief Complaint: weakness    Subjective: pt awake, looks comfortable     Medications:  Reviewed    Infusion Medications   Scheduled Medications    fluconazole  100 mg Intravenous Q24H    baclofen  10 mg Oral QAM    busPIRone  5 mg Oral BID    divalproex  750 mg Oral Nightly    divalproex  250 mg Oral BID    finasteride  5 mg Oral Daily    gabapentin  100 mg Oral TID    lactulose  20 g Oral BID    lamoTRIgine  200 mg Oral BID    levothyroxine  50 mcg Oral Daily    meropenem  1,000 mg Intravenous Q8H    polyethylene glycol  17 g Oral BID    senna  2 tablet Oral BID    sodium chloride flush  10 mL Intravenous 2 times per day    tamsulosin  0.4 mg Oral Daily     PRN Meds: acetaminophen, bisacodyl, ondansetron, sodium chloride flush      Intake/Output Summary (Last 24 hours) at 2/2/2021 0849  Last data filed at 2/2/2021 0536  Gross per 24 hour   Intake 10 ml   Output 900 ml   Net -890 ml       Exam:    BP 99/70   Pulse 83   Temp 97.8 °F (36.6 °C) (Oral)   Resp 18   Ht 5' 10\" (1.778 m)   SpO2 100%   BMI 30.76 kg/m²     General appearance: No apparent distress, appears stated age and partially cooperative. HEENT: Pupils equal, round, and reactive to light. Conjunctivae/corneas clear. Neck: Supple, with full range of motion. No jugular venous distention. Trachea midline. Respiratory:  Normal respiratory effort. Clear to auscultation, bilaterally without Rales/Wheezes/Rhonchi. Cardiovascular: Regular rate and rhythm with normal S1/S2 without murmurs, rubs or gallops. Abdomen: Soft, non-tender, non-distended with normal bowel sounds. Musculoskeletal: No clubbing, cyanosis or edema bilaterally. Skin: Skin color, texture, turgor normal.  No rashes or lesions. Neurologic:  Neurovascularly intact without any focal sensory/motor deficits.    Psychiatric:partially Alert and oriented,   Capillary Refill: Brisk,< 3 seconds   Peripheral Pulses: +2 palpable, equal bilaterally       Labs:   Recent Labs     02/01/21  0511   WBC 5.8   HGB 11.6*   HCT 34.8*        Recent Labs     02/01/21  0513      K 3.9   *   CO2 23   BUN 13   CREATININE 0.61*   CALCIUM 9.6     No results for input(s): AST, ALT, BILIDIR, BILITOT, ALKPHOS in the last 72 hours. No results for input(s): INR in the last 72 hours. No results for input(s): Yarelis Erie in the last 72 hours.     Urinalysis:      Lab Results   Component Value Date    NITRU Negative 01/30/2021    WBCUA >100 01/30/2021    BACTERIA RARE 01/30/2021    RBCUA 10-20 01/30/2021    BLOODU Moderate 01/30/2021    SPECGRAV >=1.030 01/30/2021    GLUCOSEU Negative 01/30/2021       Radiology:  CT ABDOMEN PELVIS WO CONTRAST Additional Contrast? Oral    (Results Pending)           Assessment/Plan:    Active Hospital Problems    Diagnosis Date Noted    Severe malnutrition ([facility] [E43] 01/29/2021    UTI (urinary tract infection) [N39.0] 01/28/2021         DVT Prophylaxis:   Diet: DIET GENERAL;  Dietary Nutrition Supplements: Diabetic Oral Supplement  Code Status: Prior    PT/OT Eval Status:     Dispo - UTI--  multidrug resistant klebsiella- continue with meropenem, add diflucan according to the last UA,  ID on case.   PICC done for prolong atbs course    Acute/chronic urinary retention- washburn in place. continue with flomax/finasteride per  urology service   Constipation- was informed by nurse about multiple watery BM for the past 2-3 days- repeat CT abd to rule out overflow obstruction  MRDD- supportive care, group home resident    Medically stable for skilled admission at [facility] signed by Aparna Crowell MD on 2/2/2021 at 8:27 AM

## 2021-02-02 NOTE — PROGRESS NOTES
to get out of bed with lift)            AROM RLE (degrees)  RLE General AROM: unable to assess due to pt refusal  AROM LLE (degrees)  LLE General AROM: unable to assess due to pt refusal  Strength RLE  Comment: unable to assess due to pt refusal  Strength LLE  Comment: unable to assess due to pt refusal                 Assessment      Activity Tolerance  Activity Tolerance: Patient limited by cognitive status;Treatment limited secondary to agitation  Activity Tolerance: Pt with MRDD, very verbal and adamently refusing participation wiht any PT or OT activities as eval on 1/29/21       Discharge Recommendations:  ECF with PT, Continue to assess pending progress    Goals  Short term goals  Time Frame for Short term goals: same as LTG as pt refusing and in subacute rehab for IV needs  Long term goals  Time Frame for Long term goals : 2 weeks or length of IV needs  Long term goal 1: Pt participating OOB with lift and Max A of 2 for 2/7 days  Long term goal 2: Pt propelling w/c 30 ft with UE's and /or LE's <= Min A of 1, at least 1x per week  Long term goal 3: Pt rolling with Min A of 1 to allow better postitioning for pressure relief of L ischial wound  Long term goal 4: Pt to increase endurance by sitting up in chair for 2 hours at least every other day per PT and RN reports  Patient Goals   Patient goals : Pt would not delineate any goals except to deny participation    Plan    Plan  Times per week: 2-3 x week  Plan weeks: length of IV's approx 2 weeks  Current Treatment Recommendations: Transfer Training, Wheelchair Mobility Training, Functional Mobility Training, Balance Training, Strengthening, Home Exercise Program, Safety Education & Training, Equipment Evaluation, Education, & procurement, Patient/Caregiver Education & Training  Safety Devices  Type of devices:  All fall risk precautions in place, Bed alarm in place, Call light within reach     Therapy Time   Individual Concurrent Group Co-treatment   Time In

## 2021-02-03 LAB
BACTERIA: ABNORMAL /HPF
BILIRUBIN URINE: NEGATIVE
BLOOD, URINE: ABNORMAL
CLARITY: CLEAR
COLOR: YELLOW
EPITHELIAL CELLS, UA: ABNORMAL /HPF
GLUCOSE URINE: NEGATIVE MG/DL
KETONES, URINE: ABNORMAL MG/DL
LEUKOCYTE ESTERASE, URINE: ABNORMAL
NITRITE, URINE: NEGATIVE
PH UA: 7 (ref 5–9)
PROTEIN UA: 100 MG/DL
RBC UA: ABNORMAL /HPF (ref 0–2)
SPECIFIC GRAVITY UA: 1.02 (ref 1–1.03)
URINE REFLEX TO CULTURE: YES
UROBILINOGEN, URINE: 1 E.U./DL
WBC UA: ABNORMAL /HPF (ref 0–5)
YEAST: PRESENT /HPF

## 2021-02-03 PROCEDURE — 6370000000 HC RX 637 (ALT 250 FOR IP): Performed by: INTERNAL MEDICINE

## 2021-02-03 PROCEDURE — 6360000002 HC RX W HCPCS: Performed by: INTERNAL MEDICINE

## 2021-02-03 PROCEDURE — 99221 1ST HOSP IP/OBS SF/LOW 40: CPT | Performed by: SPECIALIST

## 2021-02-03 PROCEDURE — 2580000003 HC RX 258: Performed by: INTERNAL MEDICINE

## 2021-02-03 PROCEDURE — 87086 URINE CULTURE/COLONY COUNT: CPT

## 2021-02-03 PROCEDURE — 81001 URINALYSIS AUTO W/SCOPE: CPT

## 2021-02-03 PROCEDURE — 1200000002 HC SEMI PRIVATE SWING BED

## 2021-02-03 RX ORDER — SODIUM PHOSPHATE,MONO-DIBASIC 19G-7G/118
1 ENEMA (ML) RECTAL ONCE
Status: COMPLETED | OUTPATIENT
Start: 2021-02-03 | End: 2021-02-03

## 2021-02-03 RX ORDER — LORAZEPAM 2 MG/ML
2 INJECTION INTRAMUSCULAR ONCE
Status: COMPLETED | OUTPATIENT
Start: 2021-02-03 | End: 2021-02-03

## 2021-02-03 RX ADMIN — DIVALPROEX SODIUM 750 MG: 250 TABLET, FILM COATED, EXTENDED RELEASE ORAL at 20:59

## 2021-02-03 RX ADMIN — MEROPENEM 1000 MG: 1 INJECTION, POWDER, FOR SOLUTION INTRAVENOUS at 13:28

## 2021-02-03 RX ADMIN — LEVOTHYROXINE SODIUM 50 MCG: 0.03 TABLET ORAL at 05:48

## 2021-02-03 RX ADMIN — Medication 1 ENEMA: at 10:38

## 2021-02-03 RX ADMIN — MEROPENEM 1000 MG: 1 INJECTION, POWDER, FOR SOLUTION INTRAVENOUS at 05:48

## 2021-02-03 RX ADMIN — FLUCONAZOLE 100 MG: 400 INJECTION, SOLUTION INTRAVENOUS at 13:29

## 2021-02-03 RX ADMIN — GABAPENTIN 100 MG: 100 CAPSULE ORAL at 09:06

## 2021-02-03 RX ADMIN — FINASTERIDE 5 MG: 5 TABLET, FILM COATED ORAL at 09:06

## 2021-02-03 RX ADMIN — DIVALPROEX SODIUM 250 MG: 250 TABLET, FILM COATED, EXTENDED RELEASE ORAL at 09:06

## 2021-02-03 RX ADMIN — GABAPENTIN 100 MG: 100 CAPSULE ORAL at 20:59

## 2021-02-03 RX ADMIN — Medication 10 ML: at 13:31

## 2021-02-03 RX ADMIN — SENNOSIDES 17.2 MG: 8.6 TABLET, FILM COATED ORAL at 21:01

## 2021-02-03 RX ADMIN — LACTULOSE 20 G: 20 SOLUTION ORAL at 20:59

## 2021-02-03 RX ADMIN — GABAPENTIN 100 MG: 100 CAPSULE ORAL at 13:29

## 2021-02-03 RX ADMIN — BACLOFEN 10 MG: 10 TABLET ORAL at 09:06

## 2021-02-03 RX ADMIN — LORAZEPAM 2 MG: 2 INJECTION INTRAMUSCULAR; INTRAVENOUS at 09:46

## 2021-02-03 RX ADMIN — LAMOTRIGINE 200 MG: 100 TABLET ORAL at 09:06

## 2021-02-03 RX ADMIN — LACTULOSE 20 G: 20 SOLUTION ORAL at 09:05

## 2021-02-03 RX ADMIN — BUSPIRONE HYDROCHLORIDE 5 MG: 5 TABLET ORAL at 20:59

## 2021-02-03 RX ADMIN — TAMSULOSIN HYDROCHLORIDE 0.4 MG: 0.4 CAPSULE ORAL at 09:05

## 2021-02-03 RX ADMIN — SENNOSIDES 17.2 MG: 8.6 TABLET, FILM COATED ORAL at 09:06

## 2021-02-03 RX ADMIN — BUSPIRONE HYDROCHLORIDE 5 MG: 5 TABLET ORAL at 09:06

## 2021-02-03 RX ADMIN — Medication 10 ML: at 22:07

## 2021-02-03 RX ADMIN — POLYETHYLENE GLYCOL 3350 17 G: 17 POWDER, FOR SOLUTION ORAL at 20:59

## 2021-02-03 RX ADMIN — MEROPENEM 1000 MG: 1 INJECTION, POWDER, FOR SOLUTION INTRAVENOUS at 22:06

## 2021-02-03 RX ADMIN — LAMOTRIGINE 200 MG: 100 TABLET ORAL at 21:00

## 2021-02-03 ASSESSMENT — PAIN SCALES - WONG BAKER
WONGBAKER_NUMERICALRESPONSE: 0

## 2021-02-03 NOTE — PROGRESS NOTES
Hospitalist Progress Note      PCP: Ralph Eric MD    Date of Admission: 1/28/2021    Chief Complaint: weakness    Subjective: pt awake/alert      Medications:  Reviewed    Infusion Medications   Scheduled Medications    sodium phosphate  1 enema Rectal Once    polyethylene glycol  4,000 mL Oral Once    fluconazole  100 mg Intravenous Q24H    baclofen  10 mg Oral QAM    busPIRone  5 mg Oral BID    divalproex  750 mg Oral Nightly    divalproex  250 mg Oral BID    finasteride  5 mg Oral Daily    gabapentin  100 mg Oral TID    lactulose  20 g Oral BID    lamoTRIgine  200 mg Oral BID    levothyroxine  50 mcg Oral Daily    meropenem  1,000 mg Intravenous Q8H    polyethylene glycol  17 g Oral BID    senna  2 tablet Oral BID    sodium chloride flush  10 mL Intravenous 2 times per day    tamsulosin  0.4 mg Oral Daily     PRN Meds: acetaminophen, bisacodyl, ondansetron, sodium chloride flush      Intake/Output Summary (Last 24 hours) at 2/3/2021 0727  Last data filed at 2/3/2021 1128  Gross per 24 hour   Intake --   Output 1200 ml   Net -1200 ml       Exam:    /68   Pulse 80   Temp 97.6 °F (36.4 °C) (Oral)   Resp 18   Ht 5' 10\" (1.778 m)   SpO2 98%   BMI 30.76 kg/m²     General appearance: No apparent distress, appears stated age and partially cooperative. HEENT: Pupils equal, round, and reactive to light. Conjunctivae/corneas clear. Neck: Supple, with full range of motion. No jugular venous distention. Trachea midline. Respiratory:  Normal respiratory effort. Clear to auscultation, bilaterally without Rales/Wheezes/Rhonchi. Cardiovascular: Regular rate and rhythm with normal S1/S2 without murmurs, rubs or gallops. Abdomen: Soft, non-tender, non-distended with normal bowel sounds. Musculoskeletal: No clubbing, cyanosis or edema bilaterally. Skin: Skin color, texture, turgor normal.  No rashes or lesions.   Neurologic:  Neurovascularly intact without any focal sensory/motor Siva Partida MD on 2/3/2021 at 7:27 AM

## 2021-02-03 NOTE — CONSULTS
Consults    Patient Name: Noam Brown Date: 2021  4:52 PM  MR #: [id number]  : 1970    Attending Physician: Tianna Harvey MD  Reason for consult: Fecal impaction    History of Presenting Illness:      Kiki Hillman is a 44 y.o. male on hospital day 6 with a history of UTI, GI consult was requested because of fecal impaction. Patient has history of MRDD and is very immobile, no abdominal pain no nausea no vomiting no history of any GI bleeding. CT of the abdomen showed stool throughout the colon, he is not on any narcotics. history Obtained From:  Patient and EMR      History:      Past Medical History:   Diagnosis Date    Anemia     CKD (chronic kidney disease)     Diabetes mellitus ([facility]     Has a tremor     Hyperlipidemia     Hypokalemia     Intellectual disability     Kidney stone     Lennox-Gastaut syndrome (HCC)     Paralytic ileus ([facility]     Thyroid disease     Venous thrombosis and embolism      Past Surgical History:   Procedure Laterality Date    LITHOTRIPSY  2020       Family History  History reviewed. No pertinent family history.   [] Unable to obtain due to ventilated and/ or neurologic status    Social History     Socioeconomic History    Marital status: Single     Spouse name: Not on file    Number of children: Not on file    Years of education: Not on file    Highest education level: Not on file   Occupational History    Not on file   Social Needs    Financial resource strain: Not on file    Food insecurity     Worry: Not on file     Inability: Not on file    Transportation needs     Medical: Not on file     Non-medical: Not on file   Tobacco Use    Smoking status: Never Smoker    Smokeless tobacco: Never Used   Substance and Sexual Activity    Alcohol use: Never     Frequency: Never    Drug use: Never    Sexual activity: Not Currently   Lifestyle    Physical activity     Days per week: Not on file     Minutes per session: Not on file    Stress: Not on file   Relationships    Social connections     Talks on phone: Not on file     Gets together: Not on file     Attends Yarsanism service: Not on file     Active member of club or organization: Not on file     Attends meetings of clubs or organizations: Not on file     Relationship status: Not on file    Intimate partner violence     Fear of current or ex partner: Not on file     Emotionally abused: Not on file     Physically abused: Not on file     Forced sexual activity: Not on file   Other Topics Concern    Not on file   Social History Narrative    Not on file      [] Unable to obtain due to ventilated and/ or neurologic status      Home Medications:      Medications Prior to Admission: finasteride (PROSCAR) 5 MG tablet, Take 1 tablet by mouth daily  tamsulosin (FLOMAX) 0.4 MG capsule, Take 1 capsule by mouth daily  loratadine (CLARITIN) 10 MG tablet, Take 10 mg by mouth daily as needed   diazePAM (DIASTAT) 10 MG GEL, Place 10 mg rectally once as needed.   ondansetron (ZOFRAN) 4 MG tablet, Take 4 mg by mouth every 8 hours as needed for Nausea or Vomiting  busPIRone (BUSPAR) 5 MG tablet, Take 5 mg by mouth 2 times daily  acetaminophen (TYLENOL) 325 MG tablet, Take 650 mg by mouth every 4 hours as needed for Pain or Fever (pain or temp greater than 100)  guaiFENesin (MUCINEX) 600 MG extended release tablet, Take 600 mg by mouth 2 times daily as needed for Congestion For chest congestion  calcium carbonate (TUMS EX) 750 MG chewable tablet, Take 1,000 mg by mouth every 4 hours as needed for Heartburn   ferrous sulfate (IRON 325) 325 (65 Fe) MG tablet, Take 1 tablet by mouth 2 times daily (with meals)  baclofen (LIORESAL) 10 MG tablet, Take 10 mg by mouth every morning  divalproex (DEPAKOTE ER) 250 MG extended release tablet, Take 750 mg by mouth 2 times daily   divalproex (DEPAKOTE ER) 500 MG extended release tablet, Take 500 mg by mouth nightly  Bisacodyl (DULCOLAX RE), Place 1 Units rectally daily as needed (constipation)  lamoTRIgine (LAMICTAL) 200 MG tablet, Take 200 mg by mouth 2 times daily  levothyroxine (SYNTHROID) 50 MCG tablet, Take 50 mcg by mouth Daily  meropenem (MERREM) infusion, Infuse 1,000 mg intravenously every 8 hours for 10 days Compound per protocol.  gabapentin (NEURONTIN) 100 MG capsule, Take 1 capsule by mouth 3 times daily for 30 days. Current Hospital Medications:     Scheduled Meds:   sodium phosphate  1 enema Rectal Once    polyethylene glycol  4,000 mL Oral Once    fluconazole  100 mg Intravenous Q24H    baclofen  10 mg Oral QAM    busPIRone  5 mg Oral BID    divalproex  750 mg Oral Nightly    divalproex  250 mg Oral BID    finasteride  5 mg Oral Daily    gabapentin  100 mg Oral TID    lactulose  20 g Oral BID    lamoTRIgine  200 mg Oral BID    levothyroxine  50 mcg Oral Daily    meropenem  1,000 mg Intravenous Q8H    polyethylene glycol  17 g Oral BID    senna  2 tablet Oral BID    sodium chloride flush  10 mL Intravenous 2 times per day    tamsulosin  0.4 mg Oral Daily     Continuous Infusions:  PRN Meds:.acetaminophen, bisacodyl, ondansetron, sodium chloride flush  . Allergies: Allergies   Allergen Reactions    Cefazolin     Celontin [Methsuximide]     Duricef [Cefadroxil]     Simvastatin     Tramadol Other (See Comments)     Other reaction(s):  Other: See Comments  \"causes seizures\"  Causes seizures      Vicodin [Hydrocodone-Acetaminophen]         Review of Systems:       [x] CV, Resp, Neuro, , and all other systems reviewed and negative other than listed in HPI.      [] Unable to obtain due to ventilated and/ or neurologic status      Objective Findings:     Vitals:   Vitals:    02/01/21 1952 02/01/21 1955 02/02/21 0552 02/03/21 0619   BP: (!) 123/95 (!) 123/95 99/70 106/68   Pulse: 79 80 83 80   Resp: 18  18 18   Temp: 97.9 °F (36.6 °C) 97.9 °F (36.6 °C) 97.8 °F (36.6 °C) 97.6 °F (36.4 °C)   TempSrc: Axillary  Oral Oral   SpO2: 100% 100% 100% 98%   Height:            Physical Examination:  General: In no acute distress HEENT: Normocephalic,  scleral icterus. None  Neck: No jugular venous distention. Heart: Regular, no murmur, no rub/gallop. No right ventricular heave. Lungs: Clear to ascultation, no rales/wheezing/rhonchi. Good chest wall excursion. Abdomen: Slightly distended soft nontender tympanic to percussion, bowel sounds are hypoactive  Extremities: No clubbing/cyanosis, no edema. Skin: Warm, dry, normal turgor, no rash, no bruise, no petichiae. Neuro: No myoclonus or tremor. Psych: Normal affect    Results/ Medications reviewed 2/3/2021, 7:27 AM     Laboratory, Microbiology, Pathology, Radiology, Cardiology, Medications and Transcriptions reviewed  Scheduled Meds:   sodium phosphate  1 enema Rectal Once    polyethylene glycol  4,000 mL Oral Once    fluconazole  100 mg Intravenous Q24H    baclofen  10 mg Oral QAM    busPIRone  5 mg Oral BID    divalproex  750 mg Oral Nightly    divalproex  250 mg Oral BID    finasteride  5 mg Oral Daily    gabapentin  100 mg Oral TID    lactulose  20 g Oral BID    lamoTRIgine  200 mg Oral BID    levothyroxine  50 mcg Oral Daily    meropenem  1,000 mg Intravenous Q8H    polyethylene glycol  17 g Oral BID    senna  2 tablet Oral BID    sodium chloride flush  10 mL Intravenous 2 times per day    tamsulosin  0.4 mg Oral Daily     Continuous Infusions:    Recent Labs     02/01/21  0511   WBC 5.8   HGB 11.6*   HCT 34.8*   MCV 86.9        Recent Labs     02/01/21  0513      K 3.9   *   CO2 23   BUN 13   CREATININE 0.61*     No results for input(s): AST, ALT, ALB, BILIDIR, BILITOT, ALKPHOS in the last 72 hours. No results for input(s): LIPASE, AMYLASE in the last 72 hours. No results for input(s): PROT, INR in the last 72 hours.   Ct Abdomen Pelvis Wo Contrast Additional Contrast? Oral    Result Date: 2/2/2021  CT ABDOMEN PELVIS WO CONTRAST: 2/2/2021 CLINICAL HISTORY: abd distention . COMPARISON: 1/23/2021. TECHNIQUE: Spiral images were obtained of the abdomen and pelvis without contrast. All CT scans at this facility use dose modulation, iterative reconstruction, and/or weight based dosing when appropriate to reduce radiation dose to as low as reasonably achievable. FINDINGS: A Abarca catheter decompresses the urinary bladder, with wall thickening again noted. A large amount of stool is noted within the rectum, consistent with fecal impaction, which appears mildly worsened when compared to 2 weeks ago. A moderate to large amount of stool is noted elsewhere within the colon. There is no significant surrounding inflammation, free air, abscess, ascites, or other complication identified. Mild parenchymal scarring and small nonobstructing calculi within both kidneys appear unchanged. The unenhanced liver, gallbladder, spleen, pancreas, adrenal glands, great vessels, small bowel loops, and additional images of pelvis appear within normal limits. Pleural parenchymal scarring of the right lung base and minimal probable atelectasis of the left lung base are again noted. Moderate degenerative changes of the thoracolumbar spine appear unchanged. A ABARCA CATHETER DECOMPRESSES THE URINARY BLADDER. CONTINUED FECAL IMPACTION WITHOUT SIGNIFICANT CHANGE OR ACUTE COMPLICATION FROM 4/53/7453 IDENTIFIED. OTHER CHRONIC FINDINGS, AS NOTED. Xr Abdomen (kub) (single Ap View)    Result Date: 1/22/2021  EXAMINATION: XR ABDOMEN (KUB) (SINGLE AP VIEW) CLINICAL HISTORY:  abd pain COMPARISONS:  NONE AVAILABLE TECHNIQUE:  Anterior x-ray views of the abdomen and pelvis. FINDINGS: There is retained fecal material throughout the colon and rectum worrisome for constipation. There are filled loops of colon including a large cecum with some extension diameter of 13 cm. There are dystrophic calcifications in the pelvis. No acute osseous findings.      There are air-filled loops of colon including a distended cecum measuring 13 cm in greatest diameter. There is a large amount retained fecal material throughout the colon. The findings may be secondary to colonic ileus versus obstruction, Too's. Ct Abdomen Pelvis W Iv Contrast Additional Contrast? Oral    Result Date: 1/23/2021  CT ABDOMEN PELVIS W IV CONTRAST: 1/23/2021 CLINICAL HISTORY:  Recurrent UTI, r/o  urological abnormalities . COMPARISON: 11/26/2020. TECHNIQUE: Spiral images were obtained of the abdomen and pelvis after the uneventful intravenous administration of approximately 100 mL of Isovue-300 contrast. All CT scans at this facility use dose modulation, iterative reconstruction, and/or weight based dosing when appropriate to reduce radiation dose to as low as reasonably achievable. FINDINGS: The urinary bladder is moderately distended with moderate somewhat lobulated wall thickening and mild surrounding inflammation, which appears mildly worsened compared to 11/28/2020. There is mild dilatation of the upper urinary tracts with urothelial thickening and enhancement, consistent with ascending infection. There is no significant delay in contrast enhancement and excretion from either kidney. There is no abscess, or other significant changes identified. Mild to moderate parenchymal scarring of both kidneys, a few punctate nonobstructing calculi, and a small cyst in the right upper pole are unchanged. A small right pleural effusion and infiltrate/atelectasis is noted visualized lung bases. A large amount of stool is noted throughout the colon and rectum. There is no significant small bowel dilatation. The gallbladder, pancreas, spleen, adrenal glands, great vessels, small bowel, and additional images of pelvis appear within normal limits. MILDLY WORSENING MODERATE URINARY BLADDER DISTENTION AND WALL THICKENING. MILD UPPER URINARY TRACT DILATATION AND SURROUNDING INFLAMMATION SUGGESTIVE OF ASCENDING INFECTION. NO ABSCESS OR OTHER COMPLICATION IDENTIFIED. CONSTIPATION. Ir Picc Equal Or Greater Than 5 Years    Result Date: 1/28/2021  PERIPHERALLY INSERTED CENTRAL CATHETER (PICC) PLACEMENT WITH ULTRASOUND GUIDANCE: CLINICAL HISTORY:  ANTIBIOTIC THERAPY FOR ESBL OF URINE. TECHNIQUE: A total of 3 fluoroscopic images over 27.0 seconds were obtained. Central venous catheter was inserted using a maximal sterile barrier technique which includes cap, mask, sterile gown, sterile gloves, sterile full-body drape, hand hygiene and 2% chlorhexidine for cutaneous antisepsis. A sterile ultrasound technique with sterile gel and sterile probe covers was also utilized. A pre-procedure time out was performed in order to assure the correct patient and procedure. Local anesthetic was administered. The right brachial vein was accessed with sonographic guidance. A sonographic spot image was obtained for documentation. A  guidewire was advanced into the vein with fluoroscopic guidance and a sheath was placed over the guidewire. An Arrow 5-Yi 40 cm dual lumen PICC was advanced through the sheath, up the arm and into the central vasculature. It was positioned appropriately. The sheath was removed. The catheter was shown to aspirate and infuse properly. The flange of the catheter was affixed to the arm using a PICC securement device. A spot image of the chest showed the tip of the PICC line to lie in the cavoatrial junction. The patient tolerated the procedure well and without complications. 33 of fluoroscopy were used, with a single screen image saved. No diagnostic images were obtained. SUCCESSFUL PICC PLACEMENT WITHOUT IMMEDIATE COMPLICATIONS. Impression:   Constipation probably related to his neurological status and the fact that patient is very immobile. Told patient has been refusing to take any oral preparations for constipation. Plan:   Would recommend soapsuds enemas and if the patient is agreeable try GoLYTELY. Comments:      Thank you for allowing us to participate in the care of this patient. Will continue to follow. Please call if questions or concerns arise.     Electronically signed by Hayden Gibson MD on 2/3/2021 at 7:27 AM

## 2021-02-04 LAB
ANION GAP SERPL CALCULATED.3IONS-SCNC: 10 MEQ/L (ref 9–15)
BASOPHILS ABSOLUTE: 0 K/UL (ref 0–0.2)
BASOPHILS RELATIVE PERCENT: 0.7 %
BUN BLDV-MCNC: 13 MG/DL (ref 6–20)
CALCIUM SERPL-MCNC: 9.4 MG/DL (ref 8.5–9.9)
CHLORIDE BLD-SCNC: 107 MEQ/L (ref 95–107)
CO2: 26 MEQ/L (ref 20–31)
CREAT SERPL-MCNC: 0.59 MG/DL (ref 0.7–1.2)
EOSINOPHILS ABSOLUTE: 0.2 K/UL (ref 0–0.7)
EOSINOPHILS RELATIVE PERCENT: 3.6 %
GFR AFRICAN AMERICAN: >60
GFR NON-AFRICAN AMERICAN: >60
GLUCOSE BLD-MCNC: 87 MG/DL (ref 70–99)
HCT VFR BLD CALC: 33.1 % (ref 42–52)
HEMOGLOBIN: 11 G/DL (ref 14–18)
LYMPHOCYTES ABSOLUTE: 2.1 K/UL (ref 1–4.8)
LYMPHOCYTES RELATIVE PERCENT: 33.6 %
MCH RBC QN AUTO: 28.8 PG (ref 27–31.3)
MCHC RBC AUTO-ENTMCNC: 33.3 % (ref 33–37)
MCV RBC AUTO: 86.6 FL (ref 80–100)
MONOCYTES ABSOLUTE: 0.3 K/UL (ref 0.2–0.8)
MONOCYTES RELATIVE PERCENT: 5.1 %
NEUTROPHILS ABSOLUTE: 3.5 K/UL (ref 1.4–6.5)
NEUTROPHILS RELATIVE PERCENT: 57 %
PDW BLD-RTO: 15.8 % (ref 11.5–14.5)
PLATELET # BLD: 249 K/UL (ref 130–400)
POTASSIUM SERPL-SCNC: 3.9 MEQ/L (ref 3.4–4.9)
RBC # BLD: 3.83 M/UL (ref 4.7–6.1)
SODIUM BLD-SCNC: 143 MEQ/L (ref 135–144)
WBC # BLD: 6.2 K/UL (ref 4.8–10.8)

## 2021-02-04 PROCEDURE — 1200000002 HC SEMI PRIVATE SWING BED

## 2021-02-04 PROCEDURE — 6360000002 HC RX W HCPCS: Performed by: INTERNAL MEDICINE

## 2021-02-04 PROCEDURE — 85025 COMPLETE CBC W/AUTO DIFF WBC: CPT

## 2021-02-04 PROCEDURE — 2580000003 HC RX 258: Performed by: INTERNAL MEDICINE

## 2021-02-04 PROCEDURE — 99222 1ST HOSP IP/OBS MODERATE 55: CPT | Performed by: INTERNAL MEDICINE

## 2021-02-04 PROCEDURE — 80048 BASIC METABOLIC PNL TOTAL CA: CPT

## 2021-02-04 PROCEDURE — 6370000000 HC RX 637 (ALT 250 FOR IP): Performed by: INTERNAL MEDICINE

## 2021-02-04 RX ORDER — FLUCONAZOLE 2 MG/ML
200 INJECTION, SOLUTION INTRAVENOUS EVERY 24 HOURS
Status: DISCONTINUED | OUTPATIENT
Start: 2021-02-05 | End: 2021-02-07

## 2021-02-04 RX ADMIN — GABAPENTIN 100 MG: 100 CAPSULE ORAL at 08:57

## 2021-02-04 RX ADMIN — POLYETHYLENE GLYCOL 3350 17 G: 17 POWDER, FOR SOLUTION ORAL at 08:58

## 2021-02-04 RX ADMIN — MEROPENEM 1000 MG: 1 INJECTION, POWDER, FOR SOLUTION INTRAVENOUS at 06:11

## 2021-02-04 RX ADMIN — FLUCONAZOLE 100 MG: 400 INJECTION, SOLUTION INTRAVENOUS at 13:20

## 2021-02-04 RX ADMIN — BUSPIRONE HYDROCHLORIDE 5 MG: 5 TABLET ORAL at 21:24

## 2021-02-04 RX ADMIN — GABAPENTIN 100 MG: 100 CAPSULE ORAL at 21:23

## 2021-02-04 RX ADMIN — Medication 10 ML: at 21:53

## 2021-02-04 RX ADMIN — TAMSULOSIN HYDROCHLORIDE 0.4 MG: 0.4 CAPSULE ORAL at 08:56

## 2021-02-04 RX ADMIN — LAMOTRIGINE 200 MG: 100 TABLET ORAL at 21:24

## 2021-02-04 RX ADMIN — LAMOTRIGINE 200 MG: 100 TABLET ORAL at 08:56

## 2021-02-04 RX ADMIN — LACTULOSE 20 G: 20 SOLUTION ORAL at 08:56

## 2021-02-04 RX ADMIN — SENNOSIDES 17.2 MG: 8.6 TABLET, FILM COATED ORAL at 21:24

## 2021-02-04 RX ADMIN — FINASTERIDE 5 MG: 5 TABLET, FILM COATED ORAL at 08:57

## 2021-02-04 RX ADMIN — MEROPENEM 1000 MG: 1 INJECTION, POWDER, FOR SOLUTION INTRAVENOUS at 21:23

## 2021-02-04 RX ADMIN — GABAPENTIN 100 MG: 100 CAPSULE ORAL at 14:28

## 2021-02-04 RX ADMIN — DIVALPROEX SODIUM 250 MG: 250 TABLET, FILM COATED, EXTENDED RELEASE ORAL at 21:24

## 2021-02-04 RX ADMIN — DIVALPROEX SODIUM 250 MG: 250 TABLET, FILM COATED, EXTENDED RELEASE ORAL at 08:57

## 2021-02-04 RX ADMIN — LACTULOSE 20 G: 20 SOLUTION ORAL at 21:23

## 2021-02-04 RX ADMIN — MEROPENEM 1000 MG: 1 INJECTION, POWDER, FOR SOLUTION INTRAVENOUS at 14:27

## 2021-02-04 RX ADMIN — BUSPIRONE HYDROCHLORIDE 5 MG: 5 TABLET ORAL at 08:57

## 2021-02-04 RX ADMIN — BACLOFEN 10 MG: 10 TABLET ORAL at 08:56

## 2021-02-04 RX ADMIN — DIVALPROEX SODIUM 750 MG: 250 TABLET, FILM COATED, EXTENDED RELEASE ORAL at 21:25

## 2021-02-04 RX ADMIN — LEVOTHYROXINE SODIUM 50 MCG: 0.03 TABLET ORAL at 06:11

## 2021-02-04 RX ADMIN — SENNOSIDES 17.2 MG: 8.6 TABLET, FILM COATED ORAL at 08:56

## 2021-02-04 RX ADMIN — Medication 10 ML: at 09:10

## 2021-02-04 RX ADMIN — POLYETHYLENE GLYCOL 3350 17 G: 17 POWDER, FOR SOLUTION ORAL at 21:24

## 2021-02-04 ASSESSMENT — PAIN SCALES - GENERAL
PAINLEVEL_OUTOF10: 0

## 2021-02-04 ASSESSMENT — PAIN SCALES - WONG BAKER
WONGBAKER_NUMERICALRESPONSE: 0

## 2021-02-04 NOTE — CONSULTS
tremor     Hyperlipidemia     Hypokalemia     Intellectual disability     Kidney stone     Lennox-Gastaut syndrome (HCC)     Paralytic ileus (HCC)     Thyroid disease     Venous thrombosis and embolism        Past Surgical History:   Procedure Laterality Date    LITHOTRIPSY  07/30/2020       Social History     Socioeconomic History    Marital status: Single     Spouse name: Not on file    Number of children: Not on file    Years of education: Not on file    Highest education level: Not on file   Occupational History    Not on file   Social Needs    Financial resource strain: Not on file    Food insecurity     Worry: Not on file     Inability: Not on file    Transportation needs     Medical: Not on file     Non-medical: Not on file   Tobacco Use    Smoking status: Never Smoker    Smokeless tobacco: Never Used   Substance and Sexual Activity    Alcohol use: Never     Frequency: Never    Drug use: Never    Sexual activity: Not Currently   Lifestyle    Physical activity     Days per week: Not on file     Minutes per session: Not on file    Stress: Not on file   Relationships    Social connections     Talks on phone: Not on file     Gets together: Not on file     Attends Buddhist service: Not on file     Active member of club or organization: Not on file     Attends meetings of clubs or organizations: Not on file     Relationship status: Not on file    Intimate partner violence     Fear of current or ex partner: Not on file     Emotionally abused: Not on file     Physically abused: Not on file     Forced sexual activity: Not on file   Other Topics Concern    Not on file   Social History Narrative    Not on file       History reviewed. No pertinent family history. No current facility-administered medications on file prior to encounter.       Current Outpatient Medications on File Prior to Encounter   Medication Sig Dispense Refill    finasteride (PROSCAR) 5 MG tablet Take 1 tablet by mouth daily 30 tablet 3    tamsulosin (FLOMAX) 0.4 MG capsule Take 1 capsule by mouth daily 30 capsule 3    loratadine (CLARITIN) 10 MG tablet Take 10 mg by mouth daily as needed       diazePAM (DIASTAT) 10 MG GEL Place 10 mg rectally once as needed.  ondansetron (ZOFRAN) 4 MG tablet Take 4 mg by mouth every 8 hours as needed for Nausea or Vomiting      busPIRone (BUSPAR) 5 MG tablet Take 5 mg by mouth 2 times daily      acetaminophen (TYLENOL) 325 MG tablet Take 650 mg by mouth every 4 hours as needed for Pain or Fever (pain or temp greater than 100)      calcium carbonate (TUMS EX) 750 MG chewable tablet Take 1,000 mg by mouth every 4 hours as needed for Heartburn       ferrous sulfate (IRON 325) 325 (65 Fe) MG tablet Take 1 tablet by mouth 2 times daily (with meals) 30 tablet 3    baclofen (LIORESAL) 10 MG tablet Take 10 mg by mouth every morning      divalproex (DEPAKOTE ER) 250 MG extended release tablet Take 750 mg by mouth 2 times daily       divalproex (DEPAKOTE ER) 500 MG extended release tablet Take 500 mg by mouth nightly      Bisacodyl (DULCOLAX RE) Place 1 Units rectally daily as needed (constipation)      lamoTRIgine (LAMICTAL) 200 MG tablet Take 200 mg by mouth 2 times daily      levothyroxine (SYNTHROID) 50 MCG tablet Take 50 mcg by mouth Daily      gabapentin (NEURONTIN) 100 MG capsule Take 1 capsule by mouth 3 times daily for 30 days. 90 capsule 2       Physical Exam:  Nurse has assisted in exam  General: cooperative  Skin: no new rashes   HEENT: EOMI, MMM, Neck is supple  Heart: S1 S2  Lungs: bilaterally clear   Abdomen: soft, ND, NTTP, +BS  Extrem: no pain  Neuro exam: CN II-XII intact  + washburn with sediment. Labs: I have reviewed all lab results by electronic record, including most recent CBC, metabolic panel, and pertinent abnormalities were addressed from an infectious disease perspective. WBC trends are being monitored.     Lab Results   Component Value Date     02/04/2021    K 3.9 02/04/2021    K 4.3 11/26/2020     02/04/2021    CO2 26 02/04/2021    BUN 13 02/04/2021    CREATININE 0.59 02/04/2021    GLUCOSE 87 02/04/2021    CALCIUM 9.4 02/04/2021      Lab Results   Component Value Date    WBC 6.2 02/04/2021    HGB 11.0 (L) 02/04/2021    HCT 33.1 (L) 02/04/2021    MCV 86.6 02/04/2021     02/04/2021       Radiology:   I have reviewed imaging results per electronic record and most pertinent abnormalities are being addressed from an infectious disease standpoint. Assessment:  UTI with Klebsiella ESBL, currently on Meropenem  Candiduria    Plan:  Change washburn cath  Increase diflucan to 200mg daily  Finish course of Meropenem    Discussed with nurse.      Thuy Butts D.O.

## 2021-02-05 LAB
ORGANISM: ABNORMAL
URINE CULTURE, ROUTINE: ABNORMAL

## 2021-02-05 PROCEDURE — 1200000002 HC SEMI PRIVATE SWING BED

## 2021-02-05 PROCEDURE — 6360000002 HC RX W HCPCS: Performed by: INTERNAL MEDICINE

## 2021-02-05 PROCEDURE — 6370000000 HC RX 637 (ALT 250 FOR IP): Performed by: INTERNAL MEDICINE

## 2021-02-05 PROCEDURE — 2580000003 HC RX 258: Performed by: INTERNAL MEDICINE

## 2021-02-05 PROCEDURE — 51702 INSERT TEMP BLADDER CATH: CPT

## 2021-02-05 RX ORDER — LORAZEPAM 2 MG/ML
1 INJECTION INTRAMUSCULAR EVERY 6 HOURS PRN
Status: DISCONTINUED | OUTPATIENT
Start: 2021-02-05 | End: 2021-02-08 | Stop reason: HOSPADM

## 2021-02-05 RX ADMIN — DIVALPROEX SODIUM 750 MG: 250 TABLET, FILM COATED, EXTENDED RELEASE ORAL at 20:14

## 2021-02-05 RX ADMIN — DIVALPROEX SODIUM 250 MG: 250 TABLET, FILM COATED, EXTENDED RELEASE ORAL at 20:16

## 2021-02-05 RX ADMIN — GABAPENTIN 100 MG: 100 CAPSULE ORAL at 14:32

## 2021-02-05 RX ADMIN — FINASTERIDE 5 MG: 5 TABLET, FILM COATED ORAL at 08:42

## 2021-02-05 RX ADMIN — FLUCONAZOLE, SODIUM CHLORIDE 200 MG: 2 INJECTION INTRAVENOUS at 14:33

## 2021-02-05 RX ADMIN — MEROPENEM 1000 MG: 1 INJECTION, POWDER, FOR SOLUTION INTRAVENOUS at 14:33

## 2021-02-05 RX ADMIN — MEROPENEM 1000 MG: 1 INJECTION, POWDER, FOR SOLUTION INTRAVENOUS at 07:02

## 2021-02-05 RX ADMIN — TAMSULOSIN HYDROCHLORIDE 0.4 MG: 0.4 CAPSULE ORAL at 08:42

## 2021-02-05 RX ADMIN — GABAPENTIN 100 MG: 100 CAPSULE ORAL at 08:42

## 2021-02-05 RX ADMIN — MEROPENEM 1000 MG: 1 INJECTION, POWDER, FOR SOLUTION INTRAVENOUS at 23:10

## 2021-02-05 RX ADMIN — POLYETHYLENE GLYCOL 3350 17 G: 17 POWDER, FOR SOLUTION ORAL at 08:42

## 2021-02-05 RX ADMIN — LEVOTHYROXINE SODIUM 50 MCG: 0.03 TABLET ORAL at 07:02

## 2021-02-05 RX ADMIN — LAMOTRIGINE 200 MG: 100 TABLET ORAL at 08:42

## 2021-02-05 RX ADMIN — SENNOSIDES 17.2 MG: 8.6 TABLET, FILM COATED ORAL at 08:42

## 2021-02-05 RX ADMIN — Medication 10 ML: at 08:44

## 2021-02-05 RX ADMIN — BUSPIRONE HYDROCHLORIDE 5 MG: 5 TABLET ORAL at 08:42

## 2021-02-05 RX ADMIN — SENNOSIDES 17.2 MG: 8.6 TABLET, FILM COATED ORAL at 20:14

## 2021-02-05 RX ADMIN — LAMOTRIGINE 200 MG: 100 TABLET ORAL at 20:13

## 2021-02-05 RX ADMIN — DIVALPROEX SODIUM 250 MG: 250 TABLET, FILM COATED, EXTENDED RELEASE ORAL at 08:42

## 2021-02-05 RX ADMIN — BACLOFEN 10 MG: 10 TABLET ORAL at 08:42

## 2021-02-05 RX ADMIN — BUSPIRONE HYDROCHLORIDE 5 MG: 5 TABLET ORAL at 20:13

## 2021-02-05 RX ADMIN — LORAZEPAM 1 MG: 2 INJECTION INTRAMUSCULAR; INTRAVENOUS at 14:33

## 2021-02-05 RX ADMIN — GABAPENTIN 100 MG: 100 CAPSULE ORAL at 20:14

## 2021-02-05 RX ADMIN — Medication 10 ML: at 23:11

## 2021-02-05 NOTE — PROGRESS NOTES
Comprehensive Nutrition Assessment    Type and Reason for Visit:  Reassess    Nutrition Recommendations/Plan: Continue Current Diet, Continue Oral Nutrition Supplement    Nutrition Assessment:  Pt met criteria for severe malnutrition on admit. Unable to obtain accurate weight, as pt has not been out of bed to zero before weighing. His intake continues to vary, he is a very picky eater. He tends to over eat at a meal, then skip the next 2. Staff provided snacks between meals & family brings some food in. He likes ONS, will continue to send. Follow for LOS. Estimated Daily Nutrient Needs:  Energy (kcal):  9028-0456 @ 19-21 kcal/kg; Weight Used for Energy Requirements:  Current     Protein (g):  75-90 @ 1-1.2 gr/kg; Weight Used for Protein Requirements:  Ideal        Fluid (ml/day):  2250 ml/d; Method Used for Fluid Requirements:  ml/Kg      Nutrition Related Findings:  +1 BLE edema. Last BM 2/5, bowel protocol in place 2/2 constipation. Wounds:  Pressure Injury, Stage II(buttock)       Current Nutrition Therapies:    DIET GENERAL;  Dietary Nutrition Supplements: Diabetic Oral Supplement    Anthropometric Measures:  · Height: 5' 10\" (177.8 cm)  · Current Body Weight: (n/a)   · Admission Body Weight: 214 lb 8.1 oz (97.3 kg)(bed wt)    · Usual Body Weight: 259 lb 14.8 oz (117.9 kg)(2/11/20 chart)     · Ideal Body Weight: 166 lbs; % Ideal Body Weight 124.4 %   · BMI: 29.6  · BMI Categories: Overweight (BMI 25.0-29. 9)       Nutrition Diagnosis:   · Severe malnutrition, In context of chronic, non-illness related related to cognitive or neurological impairment, early satiety as evidenced by poor intake prior to admission, intake 0-25%, weight loss greater than or equal to 20% in 1 year      Nutrition Interventions:   Food and/or Nutrient Delivery:  Continue Current Diet, Continue Oral Nutrition Supplement  Nutrition Education/Counseling:  No recommendation at this time   Coordination of Nutrition Care: Continue to monitor while inpatient, Interdisciplinary Rounds    Goals:  Intake > 50% most meals & 100% ONS. Weight >93 kg. Nutrition Monitoring and Evaluation:   Behavioral-Environmental Outcomes:  Knowledge or Skill   Food/Nutrient Intake Outcomes:  Food and Nutrient Intake, Supplement Intake  Physical Signs/Symptoms Outcomes:  Biochemical Data, Constipation, Meal Time Behavior, Fluid Status or Edema, Skin, Weight     Discharge Planning:     Too soon to determine     Electronically signed by Edison Villa RD, LD on 2/5/21 at 3:30 PM EST

## 2021-02-05 NOTE — PROGRESS NOTES
Patient awake this morning. Refused to allow nursing staff to wash patient up for the day, to check brief and to reposition him. Morning medications provided and assessment completed. Call light and belongings within reach.

## 2021-02-05 NOTE — PROGRESS NOTES
Hospitalist Progress Note      PCP: Suni Goel MD    Date of Admission: 1/28/2021    Chief Complaint: weakness    Subjective: pt eating breakfast     Medications:  Reviewed    Infusion Medications   Scheduled Medications    fluconazole  200 mg Intravenous Q24H    polyethylene glycol  4,000 mL Oral Once    baclofen  10 mg Oral QAM    busPIRone  5 mg Oral BID    divalproex  750 mg Oral Nightly    divalproex  250 mg Oral BID    finasteride  5 mg Oral Daily    gabapentin  100 mg Oral TID    lactulose  20 g Oral BID    lamoTRIgine  200 mg Oral BID    levothyroxine  50 mcg Oral Daily    meropenem  1,000 mg Intravenous Q8H    polyethylene glycol  17 g Oral BID    senna  2 tablet Oral BID    sodium chloride flush  10 mL Intravenous 2 times per day    tamsulosin  0.4 mg Oral Daily     PRN Meds: acetaminophen, bisacodyl, ondansetron, sodium chloride flush      Intake/Output Summary (Last 24 hours) at 2/5/2021 0819  Last data filed at 2/5/2021 0338  Gross per 24 hour   Intake --   Output 1800 ml   Net -1800 ml       Exam:    /70   Pulse 76   Temp 98.2 °F (36.8 °C) (Oral)   Resp 18   Ht 5' 10\" (1.778 m)   SpO2 98%   BMI 30.76 kg/m²     General appearance: No apparent distress, appears stated age and cooperative. HEENT: Pupils equal, round, and reactive to light. Conjunctivae/corneas clear. Neck: Supple, with full range of motion. No jugular venous distention. Trachea midline. Respiratory:  Normal respiratory effort. Clear to auscultation, bilaterally without Rales/Wheezes/Rhonchi. Cardiovascular: Regular rate and rhythm with normal S1/S2 without murmurs, rubs or gallops. Abdomen: Soft, non-tender, non-distended with normal bowel sounds. Musculoskeletal: No clubbing, cyanosis or edema bilaterally. Full range of motion without deformity. Skin: Skin color, texture, turgor normal.  No rashes or lesions. Neurologic:  Neurovascularly intact without any focal sensory/motor deficits. Psychiatric: Alert and oriented,   Capillary Refill: Brisk,< 3 seconds   Peripheral Pulses: +2 palpable, equal bilaterally       Labs:   Recent Labs     02/04/21 0624   WBC 6.2   HGB 11.0*   HCT 33.1*        Recent Labs     02/04/21  0624      K 3.9      CO2 26   BUN 13   CREATININE 0.59*   CALCIUM 9.4     No results for input(s): AST, ALT, BILIDIR, BILITOT, ALKPHOS in the last 72 hours. No results for input(s): INR in the last 72 hours. No results for input(s): [place] in the last 72 hours. Urinalysis:      Lab Results   Component Value Date    NITRU Negative 02/03/2021    WBCUA 10-20 02/03/2021    BACTERIA RARE 02/03/2021    RBCUA 3-5 02/03/2021    BLOODU TRACE 02/03/2021    SPECGRAV 1.020 02/03/2021    GLUCOSEU Negative 02/03/2021       Radiology:  CT ABDOMEN PELVIS WO CONTRAST Additional Contrast? Oral   Final Result      A WASHBURN CATHETER DECOMPRESSES THE URINARY BLADDER. CONTINUED FECAL IMPACTION WITHOUT SIGNIFICANT CHANGE OR ACUTE COMPLICATION FROM 2/09/8268 IDENTIFIED. OTHER CHRONIC FINDINGS, AS NOTED. Assessment/Plan:    Active Hospital Problems    Diagnosis Date Noted    Severe malnutrition ([facility] [E43] 01/29/2021    UTI (urinary tract infection) [N39.0] 01/28/2021         DVT Prophylaxis:   Diet: DIET GENERAL;  Dietary Nutrition Supplements: Diabetic Oral Supplement  Code Status: Prior    PT/OT Eval Status:     Dispo - UTI--  multidrug resistant klebsiella- continue with meropenem/diflucan according to the last UA,  ID on case. 3 \[facility] [place] [facility]\"" done for prolong atbs course.  ID on case    Acute/chronic urinary retention- washburn in place. continue with flomax/finasteride per  urology service   Constipation-  repeated CT abd was done- discussed with GI service-  fleet enema/glycole   MRDD- supportive care, group home resident    Medically stable for skilled admission at [facility] signed by Ferrell Kocher, MD on 2/5/2021 at 8:19 AM

## 2021-02-05 NOTE — PLAN OF CARE
Nutrition Problem #1: Severe malnutrition, In context of chronic, non-illness related  Intervention: Food and/or Nutrient Delivery: Continue Current Diet, Continue Oral Nutrition Supplement  Nutritional Goals: Intake > 50% most meals & 100% ONS. Weight >93 kg.

## 2021-02-05 NOTE — PROGRESS NOTES
Chart reviewed. Patient continues to receive skilled therapies at [facility] including but not limited to IV antibiotics. Multidisciplinary team met with patient's mother/ legal guardian via phone for care conference. Discussed patient's care and DC planning. Patient's mother reports no concerns at this time. Plan remains for patient to return to [facility] home upon DC from [facility].   SS to continue to follow

## 2021-02-05 NOTE — PROGRESS NOTES
Pt can be difficult to redirect with MRDD behaviors. Tentative discharge date is 2/10/21 per current IV plan. Assessment    Pt often resistive to PT and assist of staff with MRDD behaviors. We are hoping he will want to  participate more with standing lift to advance transfers and will keep attempting OOB activity. Discharge Recommendations:  ECF with PT, Continue to assess pending progress, 24 hour assist    Goals  Short term goals  Time Frame for Short term goals: same as LTG as pt refusing and in subacute rehab for IV needs  Long term goals  Time Frame for Long term goals : 2 weeks or length of IV needs  Long term goal 1: Pt participating OOB with lift and Max A of 2 for 2/7 days  Long term goal 2: Pt propelling w/c 30 ft with UE's and /or LE's <= Min A of 1, at least 1x per week  Long term goal 3: Pt rolling with Min A of 1 to allow better postitioning for pressure relief of L ischial wound  Long term goal 4: Pt to increase endurance by sitting up in chair for 2 hours at least every other day per PT and RN reports  Patient Goals   Patient goals : Pt would not delineate any goals except to deny participation    Plan    Plan  Times per week: 2-3 x week  Plan weeks: length of IV's approx 2 weeks  Current Treatment Recommendations: Transfer Training, Wheelchair Mobility Training, Functional Mobility Training, Balance Training, Strengthening, Home Exercise Program, Safety Education & Training, Equipment Evaluation, Education, & procurement, Patient/Caregiver Education & Training  Safety Devices  Type of devices:  All fall risk precautions in place, Bed alarm in place, Call light within reach     Therapy Time   Individual Concurrent Group Co-treatment   Time In  1145         Time Out  1200         Minutes  [address], [id number]

## 2021-02-05 NOTE — PROGRESS NOTES
This nurse in to change Jameson catheter per nursing communication, one other nurse and an EMT at bedside for assist with patient. Old catheter removed without complications. New catheter inserted without complications. Call light and belongings within reach.

## 2021-02-05 NOTE — PROGRESS NOTES
Physical Therapy   Subacute Daily Treatment Note  NAME: Aldo Driver  : 1970  MRN: [id number]    Date of Service: 2021    Patient Diagnosis(es):   Patient Active Problem List    Diagnosis Date Noted    Severe malnutrition ([address] 2021    UTI (urinary tract infection) 2021    Urinary tract infection due to ESBL Klebsiella 2021    Functional urinary incontinence 01/10/2021    Gastroesophageal reflux disease without esophagitis 01/10/2021    Iron deficiency anemia 01/10/2021    ESBL (extended spectrum beta-lactamase) producing bacteria infection 2020    Recurrent UTI 2020    Epileptic seizure ([address] 2020    Hypothyroidism 2020    HTN (hypertension) 2020    Chronic pain 2020    Small bowel obstruction ([address] 2020    Sepsis ([address] 2020    Moderate intellectual disabilities 2003       Past Medical History:   Diagnosis Date    Anemia     CKD (chronic kidney disease)     Diabetes mellitus ([address]     Has a tremor     Hyperlipidemia     Hypokalemia     Intellectual disability     Kidney stone     Lennox-Gastaut syndrome ([address]     Paralytic ileus ([address]     Thyroid disease     Venous thrombosis and embolism      Past Surgical History:   Procedure Laterality Date    LITHOTRIPSY  2020       Restrictions  Restrictions/Precautions  Restrictions/Precautions: Fall Risk(verbally resistive with MRDD)  Subjective       Objective    Attempt to see pt at bedside for sitting edge of bed and possible transfer trial Pt insists that he is \"eating my snack and sandwich (bagel with cream cheese and Lays potato chips). Pt reports he \"dont want to sit up at the side of the bd or practice standing'\"with the standing lift. When PT asked if pt would like to try it again sometime, pt agrees to try sitting up and using the lift again next week on Monday. Assessment     Pt with notable behaviors and MRDD.  Pt yells out when upset and is only willing to participate with therapy when he wants to. Pt primarily in subacute rehab with IV needs. Will advance pt as able within pt limitations and redirect as able. Discharge Recommendations:  ECF with PT, Continue to assess pending progress, 24 hour supervision or assist    Goals  Short term goals  Time Frame for Short term goals: same as LTG as pt refusing and in subacute rehab for IV needs  Long term goals  Time Frame for Long term goals : 2 weeks or length of IV needs  Long term goal 1: Pt participating OOB with lift and Max A of 2 for 2/7 days  Long term goal 2: Pt propelling w/c 30 ft with UE's and /or LE's <= Min A of 1, at least 1x per week  Long term goal 3: Pt rolling with Min A of 1 to allow better postitioning for pressure relief of L ischial wound  Long term goal 4: Pt to increase endurance by sitting up in chair for 2 hours at least every other day per PT and RN reports  Patient Goals   Patient goals : Pt would not delineate any goals except to deny participation    Plan    Plan  Times per week: 2-3 x week  Plan weeks: length of IV's approx 2 weeks  Current Treatment Recommendations: Transfer Training, Wheelchair Mobility Training, Functional Mobility Training, Balance Training, Strengthening, Home Exercise Program, Safety Education & Training, Equipment Evaluation, Education, & procurement, Patient/Caregiver Education & Training  Safety Devices  Type of devices:  All fall risk precautions in place, Bed alarm in place, Call light within reach     Therapy Time   Individual Concurrent Group Co-treatment   Time In  340         Time Out  [address], [id number]

## 2021-02-06 PROCEDURE — 1200000002 HC SEMI PRIVATE SWING BED

## 2021-02-06 PROCEDURE — 6360000002 HC RX W HCPCS: Performed by: INTERNAL MEDICINE

## 2021-02-06 PROCEDURE — 2580000003 HC RX 258: Performed by: INTERNAL MEDICINE

## 2021-02-06 PROCEDURE — 6370000000 HC RX 637 (ALT 250 FOR IP): Performed by: INTERNAL MEDICINE

## 2021-02-06 RX ADMIN — LAMOTRIGINE 200 MG: 100 TABLET ORAL at 09:20

## 2021-02-06 RX ADMIN — BACLOFEN 10 MG: 10 TABLET ORAL at 09:20

## 2021-02-06 RX ADMIN — DIVALPROEX SODIUM 250 MG: 250 TABLET, FILM COATED, EXTENDED RELEASE ORAL at 20:03

## 2021-02-06 RX ADMIN — Medication 10 ML: at 20:03

## 2021-02-06 RX ADMIN — BUSPIRONE HYDROCHLORIDE 5 MG: 5 TABLET ORAL at 09:20

## 2021-02-06 RX ADMIN — Medication 10 ML: at 09:21

## 2021-02-06 RX ADMIN — MEROPENEM 1000 MG: 1 INJECTION, POWDER, FOR SOLUTION INTRAVENOUS at 07:02

## 2021-02-06 RX ADMIN — SENNOSIDES 17.2 MG: 8.6 TABLET, FILM COATED ORAL at 20:01

## 2021-02-06 RX ADMIN — BUSPIRONE HYDROCHLORIDE 5 MG: 5 TABLET ORAL at 20:01

## 2021-02-06 RX ADMIN — GABAPENTIN 100 MG: 100 CAPSULE ORAL at 14:12

## 2021-02-06 RX ADMIN — LACTULOSE 20 G: 20 SOLUTION ORAL at 20:00

## 2021-02-06 RX ADMIN — DIVALPROEX SODIUM 750 MG: 250 TABLET, FILM COATED, EXTENDED RELEASE ORAL at 20:01

## 2021-02-06 RX ADMIN — TAMSULOSIN HYDROCHLORIDE 0.4 MG: 0.4 CAPSULE ORAL at 09:20

## 2021-02-06 RX ADMIN — DIVALPROEX SODIUM 250 MG: 250 TABLET, FILM COATED, EXTENDED RELEASE ORAL at 09:20

## 2021-02-06 RX ADMIN — GABAPENTIN 100 MG: 100 CAPSULE ORAL at 09:20

## 2021-02-06 RX ADMIN — POLYETHYLENE GLYCOL 3350 17 G: 17 POWDER, FOR SOLUTION ORAL at 09:19

## 2021-02-06 RX ADMIN — FINASTERIDE 5 MG: 5 TABLET, FILM COATED ORAL at 09:20

## 2021-02-06 RX ADMIN — GABAPENTIN 100 MG: 100 CAPSULE ORAL at 20:01

## 2021-02-06 RX ADMIN — LAMOTRIGINE 200 MG: 100 TABLET ORAL at 20:01

## 2021-02-06 RX ADMIN — LEVOTHYROXINE SODIUM 50 MCG: 0.03 TABLET ORAL at 07:02

## 2021-02-06 RX ADMIN — FLUCONAZOLE, SODIUM CHLORIDE 200 MG: 2 INJECTION INTRAVENOUS at 14:12

## 2021-02-06 RX ADMIN — SENNOSIDES 17.2 MG: 8.6 TABLET, FILM COATED ORAL at 09:20

## 2021-02-06 ASSESSMENT — PAIN SCALES - GENERAL: PAINLEVEL_OUTOF10: 0

## 2021-02-06 NOTE — PROGRESS NOTES
Pt refused to take stool meds. Pt refuses to turn and change pt. Pt alert and oriented. Pt denies any pain or needs at this time. Will continue to monitor pt.   Electronically signed by Subha Pugh RN on 2/5/2021 at 11:21 PM

## 2021-02-06 NOTE — PROGRESS NOTES
This nurse to bedside to flush IV ATB. Patient tearfull. This nurse asked patient what was going on, why he was sad. Patient stated that he is upset with himself as to why he is here. I explained that he was sick and that he is here to get antibiotics to help him get better so that he can go home. Patient yelled \"this is bull shit! \" I explained that I know he is upset and frustrated, but the antibiotics are helping him so that he can go home soon. I redirected the patient to watch a movie on his personal DVD player, he agreed. Patient in better spirits now with call light and belongings within reach.

## 2021-02-06 NOTE — PROGRESS NOTES
This nurse and nursing supervisor to bedside to perform washburn care and change brief due to bowel movement. Patient at first was non-compliant, verbally aggressive and physically aggressive. This nurse was able to calm the patient down and he was then agreeable to allow us to clean him up. Performed washburn care with washburn care wipes, bath wipes, zinc cream, and complete linen change with brief change. Patient left with call light and belongings within reach and lunch tray.

## 2021-02-06 NOTE — PROGRESS NOTES
Patient awake in bed this morning. Patient was agreeable to take medications once a new movie was put into personal DVD player. Assessment completed. Patient refused to allow nursing staff to change and turn him at this time. Will attempt again. Call light and belongings within reach.

## 2021-02-07 PROCEDURE — 2580000003 HC RX 258: Performed by: INTERNAL MEDICINE

## 2021-02-07 PROCEDURE — 6360000002 HC RX W HCPCS: Performed by: INTERNAL MEDICINE

## 2021-02-07 PROCEDURE — 6370000000 HC RX 637 (ALT 250 FOR IP): Performed by: INTERNAL MEDICINE

## 2021-02-07 PROCEDURE — 1200000002 HC SEMI PRIVATE SWING BED

## 2021-02-07 RX ORDER — LACTULOSE 10 G/15ML
10 SOLUTION ORAL 2 TIMES DAILY
Qty: 900 ML | Refills: 0 | Status: SHIPPED | OUTPATIENT
Start: 2021-02-07 | End: 2021-03-09

## 2021-02-07 RX ADMIN — POLYETHYLENE GLYCOL 3350 17 G: 17 POWDER, FOR SOLUTION ORAL at 09:37

## 2021-02-07 RX ADMIN — SENNOSIDES 17.2 MG: 8.6 TABLET, FILM COATED ORAL at 09:37

## 2021-02-07 RX ADMIN — DIVALPROEX SODIUM 250 MG: 250 TABLET, FILM COATED, EXTENDED RELEASE ORAL at 09:38

## 2021-02-07 RX ADMIN — DIVALPROEX SODIUM 250 MG: 250 TABLET, FILM COATED, EXTENDED RELEASE ORAL at 20:03

## 2021-02-07 RX ADMIN — BUSPIRONE HYDROCHLORIDE 5 MG: 5 TABLET ORAL at 20:01

## 2021-02-07 RX ADMIN — Medication 10 ML: at 09:40

## 2021-02-07 RX ADMIN — FLUCONAZOLE, SODIUM CHLORIDE 200 MG: 2 INJECTION INTRAVENOUS at 14:35

## 2021-02-07 RX ADMIN — LACTULOSE 20 G: 20 SOLUTION ORAL at 20:01

## 2021-02-07 RX ADMIN — Medication 10 ML: at 19:59

## 2021-02-07 RX ADMIN — SENNOSIDES 17.2 MG: 8.6 TABLET, FILM COATED ORAL at 20:02

## 2021-02-07 RX ADMIN — FINASTERIDE 5 MG: 5 TABLET, FILM COATED ORAL at 09:37

## 2021-02-07 RX ADMIN — LAMOTRIGINE 200 MG: 100 TABLET ORAL at 20:01

## 2021-02-07 RX ADMIN — LAMOTRIGINE 200 MG: 100 TABLET ORAL at 09:37

## 2021-02-07 RX ADMIN — LACTULOSE 20 G: 20 SOLUTION ORAL at 09:38

## 2021-02-07 RX ADMIN — LEVOTHYROXINE SODIUM 50 MCG: 0.03 TABLET ORAL at 05:24

## 2021-02-07 RX ADMIN — GABAPENTIN 100 MG: 100 CAPSULE ORAL at 14:35

## 2021-02-07 RX ADMIN — TAMSULOSIN HYDROCHLORIDE 0.4 MG: 0.4 CAPSULE ORAL at 09:37

## 2021-02-07 RX ADMIN — POLYETHYLENE GLYCOL 3350 17 G: 17 POWDER, FOR SOLUTION ORAL at 20:01

## 2021-02-07 RX ADMIN — GABAPENTIN 100 MG: 100 CAPSULE ORAL at 20:02

## 2021-02-07 RX ADMIN — BUSPIRONE HYDROCHLORIDE 5 MG: 5 TABLET ORAL at 09:37

## 2021-02-07 RX ADMIN — BACLOFEN 10 MG: 10 TABLET ORAL at 09:37

## 2021-02-07 RX ADMIN — GABAPENTIN 100 MG: 100 CAPSULE ORAL at 09:37

## 2021-02-07 RX ADMIN — DIVALPROEX SODIUM 750 MG: 250 TABLET, FILM COATED, EXTENDED RELEASE ORAL at 20:01

## 2021-02-07 NOTE — DISCHARGE INSTR - COC
By Review Planned Expiration Resolved Resolved By    ESBL (Extended Spectrum Beta Lactamase) 20 Culture, Urine        Klebsiella pneumoniae + ESBL urine 2021. Electronically signed by Mackenzie Parker RN on 21 at 7:57 AM EST     Klebsiella pneumoniae + ESBL of urine 11.18.20. Electronically signed by Mackenzie Parker RN on 2020 at 10:10 AM     Klebsiella pneumoniae ESBL of blood and urine 7.30.20. Electronically signed by Mackenzie Parker RN on 20 at 6:28 PM EDT       MDRO (multi-drug resistant organism) 20 Culture, Urine        Klebsiella pneumoniae ESBL of blood and urine 7.30.20. Electronically signed by Mackenzie Parker RN on 20 at 6:28 PM EDT      Resolved    COVID-19 Rule Out 21 COVID-19 (Ordered)   21 Mackenzie Parker RN    Negative for COVID was rule out for NH placement.      COVID-19 Rule Out 21 COVID-19 (Ordered)   21 Rule-Out Test Resulted    C-diff Rule Out 21 Clostridium difficile toxin/antigen (Ordered)   21 Rule-Out Test Resulted    COVID-19 Rule Out 21 COVID-19 (Ordered)   21 Rule-Out Test Resulted    C-diff Rule Out 20 Gastrointestinal Panel, Molecular (Ordered)   20 Rule-Out Test Resulted    C-diff Rule Out 20 Clostridium Difficile Toxin/Antigen (Ordered)   20 Rule-Out Test Resulted    COVID-19 Rule Out 20 COVID-19 (Ordered)   20 Rule-Out Test Resulted    COVID-19 Rule Out 20 COVID-19 (Ordered)   20     COVID-19 Rule Out 20 COVID-19 (Ordered)   20 Rule-Out Test Resulted    COVID-19 Rule Out 20 COVID-19 (Ordered)   20 Rule-Out Test Resulted            Nurse Assessment:  Last Vital Signs: /71   Pulse 76   Temp 98 °F (36.7 °C) (Axillary)   Resp 18   Ht 5' 10\" (1.778 m)   SpO2 100%   BMI 30.76 kg/m²     Last documented pain score (0-10 scale): Pain Level: 0  Last Weight:   Wt Readings from Last 1 Encounters:   01/22/21 214 lb 6.4 oz (97.3 kg)     Mental Status:  disoriented, alert, coherent and able to concentrate and follow conversation    IV Access:  - None    Nursing Mobility/ADLs:  Walking   Dependent  Transfer  Dependent  Bathing  Dependent  Dressing  Dependent  Toileting  Assisted  Feeding  Assisted  Med Admin  Dependent  Med Delivery   whole    Wound Care Documentation and Therapy:  Wound 08/02/20 Buttocks syage two  noted on both buttocks (Active)   Number of days: 188       Wound 08/02/20 Buttocks Left stage 2  meplex off due to incontinence (Active)   Number of days: 188       Wound 01/23/21 Buttocks Left stage 2 (Active)   Wound Etiology Pressure Stage  2 02/06/21 0938   Dressing Status Dry; Intact 02/06/21 0938   Wound Cleansed Soap and water 02/01/21 1505   Dressing/Treatment Zinc paste 02/06/21 0938   Wound Assessment Pink/red 02/05/21 0338   Drainage Amount None 02/02/21 1244   Odor None 02/02/21 1244   Yamileth-wound Assessment Warm;Blanchable erythema 02/05/21 0338   Number of days: 14        Elimination:  Continence:   · Bowel: No  · Bladder: Jameson  Urinary Catheter: Insertion Date: 2/5/2021 and Indication for Use of Catheter: Urology/Urologist seeing this patient or inserted indwelling catheter and Acute urinary retention/obstruction   Colostomy/Ileostomy/Ileal Conduit: No       Date of Last BM: 2/8/2021    Intake/Output Summary (Last 24 hours) at 2/7/2021 0818  Last data filed at 2/7/2021 0521  Gross per 24 hour   Intake 250 ml   Output 1150 ml   Net -900 ml     I/O last 3 completed shifts: In: 250 [P.O.:240;  I.V.:10]  Out: 1150 [Urine:1150]    Safety Concerns:     None    Impairments/Disabilities:      {[facility] Impairments/Disabilities:[id number]:::0}    Nutrition Therapy:  Current Nutrition Therapy:   - Oral Diet: General    Routes of Feeding: Oral  Liquids: Thin Liquids  Daily Fluid Restriction: no  Last Modified Barium Swallow with Video (Video Swallowing Test): not done    Treatments at the Time of Hospital Discharge:   Respiratory Treatments: None  Oxygen Therapy:  is not on home oxygen therapy. Ventilator:    - No ventilator support    Rehab Therapies: None  Weight Bearing Status/Restrictions: No weight bearing restirctions  Other Medical Equipment (for information only, NOT a DME order):  wheelchair  Other Treatments: None    Patient's personal belongings (please select all that are sent with patient):  None    RN SIGNATURE:  Electronically signed by Charles Castillo RN on 2/8/21 at 1:22 PM EST    CASE MANAGEMENT/SOCIAL WORK SECTION    Inpatient Status Date: ***    Readmission Risk Assessment Score:  Readmission Risk              Risk of Unplanned Readmission:        24           Discharging to Facility/ Agency   · Name: Digna Delacruz   · Address: [address]    / signature: Electronically signed by JOHANN Fischer on 2/8/2021 at 2:15 PM      PHYSICIAN SECTION    Prognosis: Good    Condition at Discharge: Stable    Rehab Potential (if transferring to Rehab): Good    Recommended Labs or Other Treatments After Discharge:     Physician Certification: I certify the above information and transfer of Altagracia Ott  is necessary for the continuing treatment of the diagnosis listed and that he requires [facility] for greater 30 days.      Update Admission H&P: No change in H&P    PHYSICIAN SIGNATURE:  Electronically signed by Colin Whyte MD on 2/7/21 at 8:19 AM EST

## 2021-02-07 NOTE — PROGRESS NOTES
Pt sleeping at this time. Pt alert and oriented. Pt denies any pain or needs at this time. Pt refuses to turn and be changed. Will continue to monitor pt.   Electronically signed by Tobin Cruz RN on 2/6/2021 at 11:07 PM

## 2021-02-07 NOTE — DISCHARGE SUMMARY
tablet  Commonly known as: LAMICTAL     levothyroxine 50 MCG tablet  Commonly known as: SYNTHROID     loratadine 10 MG tablet  Commonly known as: CLARITIN     ondansetron 4 MG tablet  Commonly known as: ZOFRAN     tamsulosin 0.4 MG capsule  Commonly known as: FLOMAX  Take 1 capsule by mouth daily         * This list has 2 medication(s) that are the same as other medications prescribed for you. Read the directions carefully, and ask your doctor or other care provider to review them with you.             STOP taking these medications    guaiFENesin 600 MG extended release tablet  Commonly known as: MUCINEX     meropenem  infusion  Commonly known as: MERREM           Where to Get Your Medications      These medications were sent to [facility], [address]  [address] [facility], [address] [place] [address]    Phone: [phone]   · lactulose 10 GM/15ML solution         Physical Exam:    Vitals:  Vitals:    02/05/21 1744 02/06/21 0826 02/06/21 0830 02/07/21 0733   BP: 114/70 123/74 123/74 117/71   Pulse: 87 80 80 76   Resp: 18 18  18   Temp: 97.8 °F (36.6 °C) 97.5 °F (36.4 °C) 97.5 °F (36.4 °C) 98 °F (36.7 °C)   TempSrc: Axillary Axillary  Axillary   SpO2: 96% 99% 99% 100%   Height:         Weight:       24 hour intake/output:    Intake/Output Summary (Last 24 hours) at 2/7/2021 3493  Last data filed at 2/7/2021 4598  Gross per 24 hour   Intake 250 ml   Output 1150 ml   Net -900 ml       General appearance - alert, well appearing, and in no distress  Chest - clear to auscultation, no wheezes, rales or rhonchi, symmetric air entry  Heart - normal rate, regular rhythm, normal S1, S2, no murmurs, rubs, clicks or gallops  Abdomen - soft, nontender, nondistended, no masses or organomegaly  Obese: No; Protuberant: No   Neurological -partially alert, oriented, normal speech,   Extremities - peripheral pulses normal, no pedal edema, no clubbing or cyanosis  Skin - normal coloration and turgor, no rashes, no suspicious skin lesions noted        Radiology reports as per the Radiologist  Radiology: No results found.      Results for orders placed or performed during the hospital encounter of 01/28/21   Culture, Urine    Specimen: Urine, clean catch   Result Value Ref Range    Organism Yeast (A)     Urine Culture, Routine >100,000 CFU/ml  No further workup      Culture, Urine    Specimen: Urine, clean catch   Result Value Ref Range    Organism Yeast (A)     Urine Culture, Routine 50,000 CFU/ml  No further workup      URINE RT REFLEX TO CULTURE    Specimen: Urine, clean catch   Result Value Ref Range    Color, UA Yellow Straw/Yellow    Clarity, UA Cloudy Clear    Glucose, Ur Negative Negative mg/dL    Bilirubin Urine Negative Negative    Ketones, Urine Negative Negative mg/dL    Specific Gravity, UA >=1.030 1.005 - 1.030    Blood, Urine Moderate Negative    pH, UA 6.5 5.0 - 9.0    Protein,  (A) Negative mg/dL    Urobilinogen, Urine 0.2 <2.0 E.U./dL    Nitrite, Urine Negative Negative    Leukocyte Esterase, Urine Moderate Negative    Urine Reflex to Culture Yes    Microscopic Urinalysis   Result Value Ref Range    WBC, UA >100 (A) 0 - 5 /HPF    RBC, UA 10-20 (A) 0 - 2 /HPF    Epithelial Cells, UA 0-2 /HPF    Bacteria, UA RARE (A) Negative /HPF    Yeast, UA Present (A) None Seen /HPF   BLOOD OCCULT STOOL SCREEN #1   Result Value Ref Range    OCCULT BLOOD FECAL Negative  Normal Range:Negative      CBC Auto Differential   Result Value Ref Range    WBC 5.8 4.8 - 10.8 K/uL    RBC 4.00 (L) 4.70 - 6.10 M/uL    Hemoglobin 11.6 (L) 14.0 - 18.0 g/dL    Hematocrit 34.8 (L) 42.0 - 52.0 %    MCV 86.9 80.0 - 100.0 fL    MCH 29.0 27.0 - 31.3 pg    MCHC 33.3 33.0 - 37.0 %    RDW 15.3 (H) 11.5 - 14.5 %    Platelets 863 168 - 240 K/uL    Neutrophils % 56.2 %    Lymphocytes % 30.9 %    Monocytes % 7.4 %    Eosinophils % 5.1 %    Basophils % 0.4 %    Neutrophils Absolute 3.2 1.4 - 6.5 K/uL Lymphocytes Absolute 1.8 1.0 - 4.8 K/uL    Monocytes Absolute 0.4 0.2 - 0.8 K/uL    Eosinophils Absolute 0.3 0.0 - 0.7 K/uL    Basophils Absolute 0.0 0.0 - 0.2 K/uL   Basic Metabolic Panel   Result Value Ref Range    Sodium 144 135 - 144 mEq/L    Potassium 3.9 3.4 - 4.9 mEq/L    Chloride 109 (H) 95 - 107 mEq/L    CO2 23 20 - 31 mEq/L    Anion Gap 12 9 - 15 mEq/L    Glucose 96 70 - 99 mg/dL    BUN 13 6 - 20 mg/dL    CREATININE 0.61 (L) 0.70 - 1.20 mg/dL    GFR Non-African American >60.0 >60    GFR  >60.0 >60    Calcium 9.6 8.5 - 9.9 mg/dL   URINE RT REFLEX TO CULTURE    Specimen: Urine, clean catch   Result Value Ref Range    Color, UA Yellow Straw/Yellow    Clarity, UA Clear Clear    Glucose, Ur Negative Negative mg/dL    Bilirubin Urine Negative Negative    Ketones, Urine Trace Negative mg/dL    Specific Gravity, UA 1.020 1.005 - 1.030    Blood, Urine TRACE (A) Negative    pH, UA 7.0 5.0 - 9.0    Protein,  (A) Negative mg/dL    Urobilinogen, Urine 1.0 <2.0 E.U./dL    Nitrite, Urine Negative Negative    Leukocyte Esterase, Urine TRACE (A) Negative    Urine Reflex to Culture Yes    Microscopic Urinalysis   Result Value Ref Range    WBC, UA 10-20 (A) 0 - 5 /HPF    RBC, UA 3-5 (A) 0 - 2 /HPF    Epithelial Cells, UA 0-2 /HPF    Bacteria, UA RARE (A) Negative /HPF    Yeast, UA Present (A) None Seen /HPF   CBC Auto Differential   Result Value Ref Range    WBC 6.2 4.8 - 10.8 K/uL    RBC 3.83 (L) 4.70 - 6.10 M/uL    Hemoglobin 11.0 (L) 14.0 - 18.0 g/dL    Hematocrit 33.1 (L) 42.0 - 52.0 %    MCV 86.6 80.0 - 100.0 fL    MCH 28.8 27.0 - 31.3 pg    MCHC 33.3 33.0 - 37.0 %    RDW 15.8 (H) 11.5 - 14.5 %    Platelets 396 467 - 542 K/uL    Neutrophils % 57.0 %    Lymphocytes % 33.6 %    Monocytes % 5.1 %    Eosinophils % 3.6 %    Basophils % 0.7 %    Neutrophils Absolute 3.5 1.4 - 6.5 K/uL    Lymphocytes Absolute 2.1 1.0 - 4.8 K/uL    Monocytes Absolute 0.3 0.2 - 0.8 K/uL    Eosinophils Absolute 0.2 0.0 - 0.7 K/uL    Basophils Absolute 0.0 0.0 - 0.2 K/uL   Basic Metabolic Panel   Result Value Ref Range    Sodium 143 135 - 144 mEq/L    Potassium 3.9 3.4 - 4.9 mEq/L    Chloride 107 95 - 107 mEq/L    CO2 26 20 - 31 mEq/L    Anion Gap 10 9 - 15 mEq/L    Glucose 87 70 - 99 mg/dL    BUN 13 6 - 20 mg/dL    CREATININE 0.59 (L) 0.70 - 1.20 mg/dL    GFR Non-African American >60.0 >60    GFR  >60.0 >60    Calcium 9.4 8.5 - 9.9 mg/dL       Diet:  DIET GENERAL;  Dietary Nutrition Supplements: Diabetic Oral Supplement    Activity:  Activity as tolerated (Patient may move about with assist as indicated or with supervision.)    Follow-up:  in 1 weeks with Ralph Eric MD,     Disposition: SNF    Condition: Stable    Time Spent: 40 minutes    Electronically signed by Marc Mistry MD on 2/7/2021 at 8:23 AM    Discharging Hospitalist

## 2021-02-07 NOTE — FLOWSHEET NOTE
Patient has been well behaved this shift. Patient allowed multiple depends changes throughout shift. Patient is looking forward to being discharged soon. Patients mother in this evening for visit and was updated on plan of care for patient. Patient had two smear BM this shift. Jameson Cath to gravity drainage present. Patient enjoyed listening to music this evening. Call light within reach and patient uses appropriately.

## 2021-02-08 VITALS
HEART RATE: 69 BPM | BODY MASS INDEX: 30.76 KG/M2 | RESPIRATION RATE: 18 BRPM | TEMPERATURE: 98.2 F | OXYGEN SATURATION: 100 % | SYSTOLIC BLOOD PRESSURE: 139 MMHG | HEIGHT: 70 IN | DIASTOLIC BLOOD PRESSURE: 77 MMHG

## 2021-02-08 LAB
ANION GAP SERPL CALCULATED.3IONS-SCNC: 9 MEQ/L (ref 9–15)
BASOPHILS ABSOLUTE: 0 K/UL (ref 0–0.2)
BASOPHILS RELATIVE PERCENT: 0.8 %
BUN BLDV-MCNC: 12 MG/DL (ref 6–20)
CALCIUM SERPL-MCNC: 9.7 MG/DL (ref 8.5–9.9)
CHLORIDE BLD-SCNC: 107 MEQ/L (ref 95–107)
CO2: 27 MEQ/L (ref 20–31)
CREAT SERPL-MCNC: 0.58 MG/DL (ref 0.7–1.2)
EOSINOPHILS ABSOLUTE: 0.2 K/UL (ref 0–0.7)
EOSINOPHILS RELATIVE PERCENT: 3.1 %
GFR AFRICAN AMERICAN: >60
GFR NON-AFRICAN AMERICAN: >60
GLUCOSE BLD-MCNC: 89 MG/DL (ref 70–99)
HCT VFR BLD CALC: 34.2 % (ref 42–52)
HEMOGLOBIN: 11.1 G/DL (ref 14–18)
LYMPHOCYTES ABSOLUTE: 2.3 K/UL (ref 1–4.8)
LYMPHOCYTES RELATIVE PERCENT: 41.3 %
MCH RBC QN AUTO: 28.3 PG (ref 27–31.3)
MCHC RBC AUTO-ENTMCNC: 32.5 % (ref 33–37)
MCV RBC AUTO: 87.3 FL (ref 80–100)
MONOCYTES ABSOLUTE: 0.3 K/UL (ref 0.2–0.8)
MONOCYTES RELATIVE PERCENT: 6.1 %
NEUTROPHILS ABSOLUTE: 2.7 K/UL (ref 1.4–6.5)
NEUTROPHILS RELATIVE PERCENT: 48.7 %
PDW BLD-RTO: 15.8 % (ref 11.5–14.5)
PLATELET # BLD: 223 K/UL (ref 130–400)
POTASSIUM SERPL-SCNC: 4.1 MEQ/L (ref 3.4–4.9)
RBC # BLD: 3.92 M/UL (ref 4.7–6.1)
SODIUM BLD-SCNC: 143 MEQ/L (ref 135–144)
WBC # BLD: 5.5 K/UL (ref 4.8–10.8)

## 2021-02-08 PROCEDURE — 6370000000 HC RX 637 (ALT 250 FOR IP): Performed by: INTERNAL MEDICINE

## 2021-02-08 PROCEDURE — 2580000003 HC RX 258: Performed by: INTERNAL MEDICINE

## 2021-02-08 PROCEDURE — 36592 COLLECT BLOOD FROM PICC: CPT

## 2021-02-08 PROCEDURE — 85025 COMPLETE CBC W/AUTO DIFF WBC: CPT

## 2021-02-08 PROCEDURE — 80048 BASIC METABOLIC PNL TOTAL CA: CPT

## 2021-02-08 PROCEDURE — 6360000002 HC RX W HCPCS: Performed by: INTERNAL MEDICINE

## 2021-02-08 RX ADMIN — BUSPIRONE HYDROCHLORIDE 5 MG: 5 TABLET ORAL at 09:39

## 2021-02-08 RX ADMIN — POLYETHYLENE GLYCOL 3350 17 G: 17 POWDER, FOR SOLUTION ORAL at 09:40

## 2021-02-08 RX ADMIN — SENNOSIDES 17.2 MG: 8.6 TABLET, FILM COATED ORAL at 09:39

## 2021-02-08 RX ADMIN — Medication 10 ML: at 09:40

## 2021-02-08 RX ADMIN — FINASTERIDE 5 MG: 5 TABLET, FILM COATED ORAL at 09:38

## 2021-02-08 RX ADMIN — TAMSULOSIN HYDROCHLORIDE 0.4 MG: 0.4 CAPSULE ORAL at 09:39

## 2021-02-08 RX ADMIN — BACLOFEN 10 MG: 10 TABLET ORAL at 09:39

## 2021-02-08 RX ADMIN — GABAPENTIN 100 MG: 100 CAPSULE ORAL at 09:39

## 2021-02-08 RX ADMIN — LEVOTHYROXINE SODIUM 50 MCG: 0.03 TABLET ORAL at 05:36

## 2021-02-08 RX ADMIN — LAMOTRIGINE 200 MG: 100 TABLET ORAL at 09:38

## 2021-02-08 RX ADMIN — DIVALPROEX SODIUM 250 MG: 250 TABLET, FILM COATED, EXTENDED RELEASE ORAL at 09:39

## 2021-02-08 RX ADMIN — LORAZEPAM 1 MG: 2 INJECTION INTRAMUSCULAR; INTRAVENOUS at 11:59

## 2021-02-08 ASSESSMENT — PAIN SCALES - GENERAL: PAINLEVEL_OUTOF10: 0

## 2021-02-08 ASSESSMENT — ENCOUNTER SYMPTOMS
GASTROINTESTINAL NEGATIVE: 1
RESPIRATORY NEGATIVE: 1

## 2021-02-08 NOTE — PROGRESS NOTES
 acetaminophen (TYLENOL) 325 MG tablet Take 650 mg by mouth every 4 hours as needed for Pain or Fever (pain or temp greater than 100)      calcium carbonate (TUMS EX) 750 MG chewable tablet Take 1,000 mg by mouth every 4 hours as needed for Heartburn       ferrous sulfate (IRON 325) 325 (65 Fe) MG tablet Take 1 tablet by mouth 2 times daily (with meals) 30 tablet 3    baclofen (LIORESAL) 10 MG tablet Take 10 mg by mouth every morning      divalproex (DEPAKOTE ER) 250 MG extended release tablet Take 750 mg by mouth 2 times daily       divalproex (DEPAKOTE ER) 500 MG extended release tablet Take 500 mg by mouth nightly      Bisacodyl (DULCOLAX RE) Place 1 Units rectally daily as needed (constipation)      lamoTRIgine (LAMICTAL) 200 MG tablet Take 200 mg by mouth 2 times daily      levothyroxine (SYNTHROID) 50 MCG tablet Take 50 mcg by mouth Daily      gabapentin (NEURONTIN) 100 MG capsule Take 1 capsule by mouth 3 times daily for 30 days. 90 capsule 2       Allergies   Allergen Reactions    Cefazolin     Celontin [Methsuximide]     Duricef [Cefadroxil]     Simvastatin     Tramadol Other (See Comments)     Other reaction(s): Other: See Comments  \"causes seizures\"  Causes seizures      Vicodin [Hydrocodone-Acetaminophen]          History reviewed. No pertinent family history. Physical Exam:      Physical Exam   Constitutional: No distress. Cardiovascular: Normal heart sounds. No murmur heard. Pulmonary/Chest: Effort normal and breath sounds normal. No respiratory distress. He has no wheezes. He has no rales. He exhibits no tenderness. Abdominal: Soft. Bowel sounds are normal. He exhibits no distension and no mass. There is no abdominal tenderness. There is no rebound and no guarding. Genitourinary:    Genitourinary Comments: Urine in Jameson clear         Blood pressure 139/77, pulse 69, temperature 98.2 °F (36.8 °C), temperature source Axillary, resp.  rate 18, height 5' 10\" (1.778 m), SpO2 100 %.       .   Lab Results   Component Value Date    WBC 5.5 02/08/2021    HGB 11.1 (L) 02/08/2021    HCT 34.2 (L) 02/08/2021    MCV 87.3 02/08/2021     02/08/2021     Lab Results   Component Value Date     02/08/2021    K 4.1 02/08/2021    K 4.3 11/26/2020     02/08/2021    CO2 27 02/08/2021    BUN 12 02/08/2021    CREATININE 0.58 02/08/2021    GLUCOSE 89 02/08/2021    CALCIUM 9.7 02/08/2021                ASSESSMENT:  Patient Active Problem List   Diagnosis    Sepsis ([address]    Small bowel obstruction ([address]    Epileptic [facility] ([facility]    Hypothyroidism    HTN (hypertension)    Moderate intellectual disabilities    Chronic pain    Recurrent UTI    ESBL (extended spectrum beta-lactamase) producing bacteria infection    Functional urinary incontinence    Gastroesophageal reflux disease without esophagitis    Iron deficiency anemia    Urinary tract infection due to ESBL Klebsiella    UTI (urinary tract infection)    Severe malnutrition (HCC)         PLAN:  UTI with Klebsiella pneumonia  UTI with Candida    Completed therapy with meropenem and fluconazole

## 2021-02-08 NOTE — PROGRESS NOTES
Chart reviewed. Patient's care discussed in daily quality rounds. Patient is reported to have completed IV antibiotics and is to be DC back to [facility] on this date. This  contacted patient's mother Mark Dale whom is patient's legal guardian. Tiffanie agreeable with patient being DC back to [facility] on this date. This  contacted [facility] and was informed that patient does not require a COVID test to return to facility. This  notified [facility] RN Rubio Childs. SANDRO completed. Unit coordinator Cindi arranged transportation with [facility] EMS squad to transport patient back to facility. SS to continue to follow as needed while patient is at [facility].

## 2021-02-08 NOTE — PROGRESS NOTES
Called report to Peggy Palacios at [facility]. Verbalized understanding.  Waiting for transportation for discharge

## 2021-02-08 NOTE — PROGRESS NOTES
Peter Leonardo reviewed with pt's mother, Shayna West.  She verbalized understanding, this was witness via telephone conversation by myself and Arun Patel RN, 2/8/21 @ 10:14am.

## 2021-02-08 NOTE — PROGRESS NOTES
Pt sleeping at this time. Pt denies any pain or needs at this time. Pt asked this nurse to pack up his stuff so he is ready to go tomorrow back to [facility]. Will continue to monitor pt and check him for incontinence. Pt still has washburn in place. Call light in place.    Electronically signed by Oneil Cooper RN on 2/7/2021 at 11:17 PM

## 2021-02-08 NOTE — PROGRESS NOTES
Physical Therapy Discharge Note    PT attempted tx this AM. Pt stated \"I finished my antibiotics. They said I could go home today. I don't want to do therapy today. TX deferred. Over course of antibiotic needs, Pt never agreed to transfer OOB with therapy, but did attempt standing lift once (9 trials with Max A of 2-3 without success to stand) and sat EOB once for approx  20 minutes for one session. Pt will need dependent lift from bed to chair upon return to facility in current state of function. Unable to address and advance towards LTG due to behavioral issues and MRDD. Difficult to redirect. Plan: Discharge PT as pt discharging due to completion of antibiotic.     Ronak Reyes, [facility] [facility],[address]

## 2021-02-15 DIAGNOSIS — R56.9 SEIZURES (HCC): ICD-10-CM

## 2021-02-15 RX ORDER — GABAPENTIN 100 MG/1
100 CAPSULE ORAL 3 TIMES DAILY
Qty: 90 CAPSULE | Refills: 2 | Status: SHIPPED | OUTPATIENT
Start: 2021-02-15 | End: 2021-04-15 | Stop reason: SDUPTHER

## 2021-02-18 ENCOUNTER — OFFICE VISIT (OUTPATIENT)
Dept: GERIATRIC MEDICINE | Age: 51
End: 2021-02-18
Payer: MEDICARE

## 2021-02-18 DIAGNOSIS — Z16.12 ESBL (EXTENDED SPECTRUM BETA-LACTAMASE) PRODUCING BACTERIA INFECTION: ICD-10-CM

## 2021-02-18 DIAGNOSIS — R60.0 BILATERAL LEG EDEMA: ICD-10-CM

## 2021-02-18 DIAGNOSIS — I10 ESSENTIAL HYPERTENSION: Primary | ICD-10-CM

## 2021-02-18 DIAGNOSIS — Z87.19 HX OF SMALL BOWEL OBSTRUCTION: ICD-10-CM

## 2021-02-18 DIAGNOSIS — A49.9 ESBL (EXTENDED SPECTRUM BETA-LACTAMASE) PRODUCING BACTERIA INFECTION: ICD-10-CM

## 2021-02-18 PROCEDURE — G8484 FLU IMMUNIZE NO ADMIN: HCPCS | Performed by: PHYSICIAN ASSISTANT

## 2021-02-18 PROCEDURE — 99308 SBSQ NF CARE LOW MDM 20: CPT | Performed by: PHYSICIAN ASSISTANT

## 2021-02-25 NOTE — PROGRESS NOTES
Subjective:      Patient ID: Aniya Motley is a pleasant 44 y.o. male who presents today for:  Chief Complaint   Patient presents with    1 Month Follow-Up     Patient being seen today for monthly follow-up visit for history of bladder distention and urinary retention as well as recurrent UTI. Patient was recently treated with IV antibiotics for UTI with urinary retention. Patient will continue be straight cath with monitoring of residuals per urology due to high risk of urinary retention and increased risk of UTIs. Patient to have ultrasound of bladder and kidneys next week per urology to monitor for changes. Currently patient is stable without any new acute distress dysuria or evident urinary retention. Vital signs today /75, pulse 94, respirations 18, heparinizing 0.2, SPO2 95% room air, weight is 232.2 pounds. Patient appears comfortable in his room in his wheelchair watching a movie on his DVD player. Patient can be mildly difficult at times regarding his care and compliance, however it seems to be improving overall and sitting along well with staff and others. We will follow-up with nursing staff about his results. Otherwise maintain current parameters per chart.         Patient Active Problem List   Diagnosis    Sepsis ([address]    Small bowel obstruction ([address]    Epileptic seizure ([address]    Hypothyroidism    HTN (hypertension)    Moderate intellectual disabilities    Chronic pain    Recurrent UTI    ESBL (extended spectrum beta-lactamase) producing bacteria infection    Functional urinary incontinence    Gastroesophageal reflux disease without esophagitis    Iron deficiency anemia    Urinary tract infection due to ESBL Klebsiella    UTI (urinary tract infection)    Severe malnutrition (HCC)     Past Medical History:   Diagnosis Date    Anemia     CKD (chronic kidney disease)     Diabetes mellitus ([address]     Has a tremor     Hyperlipidemia     Hypokalemia  Intellectual disability     Kidney stone     Lennox-Gastaut syndrome (HCC)     Paralytic ileus (HCC)     Thyroid disease     Venous thrombosis and embolism      Past Surgical History:   Procedure Laterality Date    LITHOTRIPSY  07/30/2020     Social History     Socioeconomic History    Marital status: Single     Spouse name: Not on file    Number of children: Not on file    Years of education: Not on file    Highest education level: Not on file   Occupational History    Not on file   Social Needs    Financial resource strain: Not on file    Food insecurity     Worry: Not on file     Inability: Not on file    Transportation needs     Medical: Not on file     Non-medical: Not on file   Tobacco Use    Smoking status: Never Smoker    Smokeless tobacco: Never Used   Substance and Sexual Activity    Alcohol use: Never     Frequency: Never    Drug use: Never    Sexual activity: Not Currently   Lifestyle    Physical activity     Days per week: Not on file     Minutes per session: Not on file    Stress: Not on file   Relationships    Social connections     Talks on phone: Not on file     Gets together: Not on file     Attends Yarsanism service: Not on file     Active member of club or organization: Not on file     Attends meetings of clubs or organizations: Not on file     Relationship status: Not on file    Intimate partner violence     Fear of current or ex partner: Not on file     Emotionally abused: Not on file     Physically abused: Not on file     Forced sexual activity: Not on file   Other Topics Concern    Not on file   Social History Narrative    Not on file     No family history on file. Allergies   Allergen Reactions    Cefazolin     Celontin [Methsuximide]     Duricef [Cefadroxil]     Simvastatin     Tramadol Other (See Comments)     Other reaction(s):  Other: See Comments  \"causes seizures\"  Causes seizures      Vicodin [Hydrocodone-Acetaminophen]          Review of Systems Constitutional: Negative for activity change, appetite change, chills, diaphoresis, fever and unexpected weight change. HENT: Negative. Respiratory: Negative for cough, choking, chest tightness, shortness of breath, wheezing and stridor. Cardiovascular: Positive for leg swelling (intermittent). Negative for chest pain. Gastrointestinal: Positive for abdominal distention. Negative for abdominal pain, diarrhea, nausea and vomiting. Genitourinary: Positive for difficulty urinating (frequent straight cath required; recent UTI tx with IV abx). Negative for decreased urine volume, flank pain, frequency, hematuria and urgency. Neurological: Positive for weakness (generalized, baseline). Psychiatric/Behavioral: Positive for agitation and behavioral problems (intermittent medical non-compliance/resistance). Negative for confusion, decreased concentration, dysphoric mood and hallucinations. Mild-mod IDD   All other systems reviewed and are negative. Objective: There were no vitals taken for this visit. Physical Exam  Constitutional:       General: He is not in acute distress. Appearance: Normal appearance. He is obese. He is not ill-appearing. HENT:      Head: Normocephalic and atraumatic. Mouth/Throat:      Mouth: Mucous membranes are moist.      Pharynx: Oropharynx is clear. No oropharyngeal exudate or posterior oropharyngeal erythema. Eyes:      General: No scleral icterus. Conjunctiva/sclera: Conjunctivae normal.      Pupils: Pupils are equal, round, and reactive to light. Neck:      Musculoskeletal: Normal range of motion. Cardiovascular:      Rate and Rhythm: Normal rate and regular rhythm. Pulses: Normal pulses. Heart sounds: Normal heart sounds. Pulmonary:      Effort: Pulmonary effort is normal.      Breath sounds: Normal breath sounds. Abdominal:      General: Bowel sounds are normal. There is distension. Palpations: Abdomen is soft.

## 2021-02-26 ASSESSMENT — ENCOUNTER SYMPTOMS
VOMITING: 0
CHEST TIGHTNESS: 0
NAUSEA: 0
ABDOMINAL DISTENTION: 1
DIARRHEA: 0
STRIDOR: 0
CHOKING: 0
WHEEZING: 0
COUGH: 0
SHORTNESS OF BREATH: 0
ABDOMINAL PAIN: 0

## 2021-02-27 PROBLEM — N39.0 UTI (URINARY TRACT INFECTION): Status: RESOLVED | Noted: 2021-01-28 | Resolved: 2021-02-27

## 2021-03-03 NOTE — PROGRESS NOTES
Chas Gonzalez is a 44 y.o. male evaluated via telephone on 1/13/2021. Consent:  He and/or health care decision maker is aware that that he may receive a bill for this telephone service, depending on his insurance coverage, and has provided verbal consent to proceed: Yes      Documentation:  I communicated with the patient and/or health care decision maker about     Cher Vasquez     Seen via tele visit. The patient was evaluated today for history of urinary retention. The patient has moderate IDD. Discussed with nurse present during routine Jameson catheter versus routine straight cath. We will continue straight cathing as needed. The patient does utilize brief, has urinary retention history and history of recurrent UTIs. The patient does not maintain fair self urination, although with incontinence. Monitoring for any signs or symptoms of retention. There is no bladder scan available. We will monitor residuals if straight catheterization is utilized. Electronically Signed By: Issac Gilliam PA-C on 01/03/2021 23:48:07  ______________________________  Issac Gilliam PA-C  IT/[id number]  D: 01/02/2021 21:11:12  T: 01/03/2021 17:12:29     cc: - Patsy. Details of this discussion including any medical advice provided: see above      I affirm this is a Patient Initiated Episode with a Patient who has not had a related appointment within my department in the past 7 days or scheduled within the next 24 hours.     Patient identification was verified at the start of the visit: Yes    Total Time: minutes: 11-20 minutes The visit was conducted pursuant to the emergency declaration under the [facility], [address] [place] [address] authority and the [employer] Act. Patient identification was verified, and a caregiver was present when appropriate. The patient was located in a state where the provider was credentialed to provide care.     Note: not billable if this call serves to triage the patient into an appointment for the relevant concern      Francy Woo

## 2021-03-09 PROBLEM — Z87.19 HX OF SMALL BOWEL OBSTRUCTION: Status: ACTIVE | Noted: 2021-03-09

## 2021-03-09 ASSESSMENT — ENCOUNTER SYMPTOMS
VOMITING: 0
SHORTNESS OF BREATH: 0
NAUSEA: 0
COUGH: 0
ABDOMINAL PAIN: 0
CONSTIPATION: 0
WHEEZING: 0
STRIDOR: 0
ABDOMINAL DISTENTION: 1
DIARRHEA: 0
CHOKING: 0

## 2021-03-09 NOTE — PROGRESS NOTES
mellitus ([facility] [address]     Has a tremor     Hyperlipidemia     Hypokalemia     Intellectual disability     Kidney stone     Lennox-Gastaut syndrome (HCC)     Paralytic ileus (HCC)     Thyroid disease     Venous thrombosis and embolism      Past Surgical History:   Procedure Laterality Date    LITHOTRIPSY  07/30/2020     Social History     Socioeconomic History    Marital status: Single     Spouse name: Not on file    Number of children: Not on file    Years of education: Not on file    Highest education level: Not on file   Occupational History    Not on file   Social Needs    Financial resource strain: Not on file    Food insecurity     Worry: Not on file     Inability: Not on file   [employer] needs     Medical: Not on file     Non-medical: Not on file   Tobacco Use    Smoking status: Never Smoker    Smokeless tobacco: Never Used   Substance and Sexual Activity    Alcohol use: Never     Frequency: Never    Drug use: Never    Sexual activity: Not Currently   Lifestyle    Physical activity     Days per week: Not on file     Minutes per session: Not on file    Stress: Not on file   Relationships    Social connections     Talks on phone: Not on file     Gets together: Not on file     Attends Islam service: Not on file     Active member of club or organization: Not on file     Attends meetings of clubs or organizations: Not on file     Relationship status: Not on file    Intimate partner violence     Fear of current or ex partner: Not on file     Emotionally abused: Not on file     Physically abused: Not on file     Forced sexual activity: Not on file   Other Topics Concern    Not on file   Social History Narrative    Not on file     No family history on file. Allergies   Allergen Reactions    Cefazolin     Celontin [Methsuximide]     Duricef [Cefadroxil]     Simvastatin     Tramadol Other (See Comments)     Other reaction(s):  Other: See Comments  \"causes seizures\"  Causes seizures with normal, straw-colored urine  Skin:     General: Skin is warm and dry. Neurological:      Mental Status: He is alert. Mental status is at baseline. He is disoriented. Comments: IDD   Psychiatric:         Mood and Affect: Mood normal.         Behavior: Behavior normal.         Assessment:       Diagnosis Orders   1. Essential hypertension     2. Hx of small bowel obstruction     3. ESBL (extended spectrum beta-lactamase) producing bacteria infection     4. Bilateral leg edema           Plan:      No orders of the defined types were placed in this encounter. No orders of the defined types were placed in this encounter. 1.  BP well controlled. Continue monitoring vitals. No new changes. 2.  Mid evidence of SBO. Continue current utilization of as needed's when needed for constipation. Patient tolerating intake well  3. Noted evidence of UTI. Straw-colored urine. Denies any suprapubic pain or new retention. No CVA tenderness. We will continue close monitoring. 4.  Add Lasix 20 mg p.o. daily x3 days. We will follow-up if worsening edema. May continue if chronic. No follow-ups on file. Side effects, adverse effects of the medication prescribed today, as well as treatment plan and result expectations have been discussed withthe patient who expresses understanding and desires to proceed.     Tania Jacob

## 2021-04-15 DIAGNOSIS — R56.9 SEIZURES (HCC): ICD-10-CM

## 2021-04-15 RX ORDER — GABAPENTIN 100 MG/1
100 CAPSULE ORAL 3 TIMES DAILY
Qty: 90 CAPSULE | Refills: 2 | Status: SHIPPED | OUTPATIENT
Start: 2021-04-15 | End: 2021-06-15

## 2021-05-01 LAB
BILIRUBIN, URINE: NEGATIVE
BLOOD, URINE: NEGATIVE
CLARITY: ABNORMAL
COLOR: YELLOW
GLUCOSE URINE: NEGATIVE
KETONES, URINE: POSITIVE
LEUKOCYTE ESTERASE, URINE: ABNORMAL
NITRITE, URINE: NEGATIVE
PH UA: 6 (ref 4.5–8)
PROTEIN UA: ABNORMAL
SPECIFIC GRAVITY, URINE: 1.02
UROBILINOGEN, URINE: NORMAL

## 2021-06-15 DIAGNOSIS — R56.9 SEIZURES (HCC): ICD-10-CM

## 2021-06-19 RX ORDER — GABAPENTIN 100 MG/1
100 CAPSULE ORAL 3 TIMES DAILY
Qty: 90 CAPSULE | Refills: 0 | Status: SHIPPED | OUTPATIENT
Start: 2021-06-19 | End: 2021-07-22

## 2021-07-22 DIAGNOSIS — R56.9 SEIZURES (HCC): ICD-10-CM

## 2021-07-22 RX ORDER — GABAPENTIN 100 MG/1
CAPSULE ORAL
Qty: 90 CAPSULE | Refills: 3 | Status: ON HOLD | OUTPATIENT
Start: 2021-07-22 | End: 2021-08-21

## 2021-10-25 ENCOUNTER — OFFICE VISIT (OUTPATIENT)
Dept: GASTROENTEROLOGY | Age: 51
End: 2021-10-25
Payer: MEDICARE

## 2021-10-25 VITALS
OXYGEN SATURATION: 98 % | SYSTOLIC BLOOD PRESSURE: 124 MMHG | HEART RATE: 88 BPM | RESPIRATION RATE: 12 BRPM | DIASTOLIC BLOOD PRESSURE: 76 MMHG

## 2021-10-25 DIAGNOSIS — R93.3 ABNORMAL CT SCAN, COLON: Primary | ICD-10-CM

## 2021-10-25 PROCEDURE — G8427 DOCREV CUR MEDS BY ELIG CLIN: HCPCS | Performed by: INTERNAL MEDICINE

## 2021-10-25 PROCEDURE — G8484 FLU IMMUNIZE NO ADMIN: HCPCS | Performed by: INTERNAL MEDICINE

## 2021-10-25 PROCEDURE — 1036F TOBACCO NON-USER: CPT | Performed by: INTERNAL MEDICINE

## 2021-10-25 PROCEDURE — 99215 OFFICE O/P EST HI 40 MIN: CPT | Performed by: INTERNAL MEDICINE

## 2021-10-25 PROCEDURE — 3017F COLORECTAL CA SCREEN DOC REV: CPT | Performed by: INTERNAL MEDICINE

## 2021-10-25 PROCEDURE — G8417 CALC BMI ABV UP PARAM F/U: HCPCS | Performed by: INTERNAL MEDICINE

## 2021-10-25 RX ORDER — MIDAZOLAM 5 MG/.1ML
SPRAY NASAL
Status: ON HOLD | COMMUNITY
Start: 2021-10-07

## 2021-10-25 RX ORDER — AMLODIPINE BESYLATE 2.5 MG/1
TABLET ORAL
Status: ON HOLD | COMMUNITY
Start: 2021-10-22

## 2021-10-25 RX ORDER — LACTULOSE 10 G/15ML
SOLUTION ORAL
Status: ON HOLD | COMMUNITY
Start: 2021-10-22

## 2021-10-25 RX ORDER — POLYETHYLENE GLYCOL 3350, SODIUM CHLORIDE, SODIUM BICARBONATE, POTASSIUM CHLORIDE 420; 11.2; 5.72; 1.48 G/4L; G/4L; G/4L; G/4L
4000 POWDER, FOR SOLUTION ORAL ONCE
Qty: 4000 ML | Refills: 0 | Status: SHIPPED | OUTPATIENT
Start: 2021-10-25 | End: 2021-10-25

## 2021-10-25 RX ORDER — POLYETHYLENE GLYCOL 3350 17 G/17G
17 POWDER, FOR SOLUTION ORAL PRN
Status: ON HOLD | COMMUNITY

## 2021-10-25 NOTE — PROGRESS NOTES
Subjective:      Patient ID: Ramon Ribeiro is a 46 y.o. male who presents today for:  Chief Complaint   Patient presents with    Diarrhea       HPI   This is a very pleasant 17-year-old with known history of MRDD, Lennox-Gastaut syndrome epilepsy, neurogenic bladder, hypothyroidism, hypertension, diabetes mellitus for which was seen initially in the hospital almost a year ago at that time owing to abnormal CT imaging. CT scan at that time showed mural thickening in the context of significant stool burden. Patient also was recently hospitalized for food impaction. At time of initial evaluation, colonoscopy was declined by family member and legal guardian. Noted patient at the time with evidence of sepsis and bacteremia secondary to UTI patient was plan to proceed with colonoscopy at further date. She was hospitalized subsequently with food impaction. Patient came in today accompanied by caregiver. Patient lives in group home. According to caregiver, patient was formed stool. No blood in the stool reported. No abdominal pain. No hematemesis melena hematochezia. Patient came in for need and evaluation for colonoscopy. Admit date:  1/28/2021                        Discharge date:   02/08/21       Admitted for: [facility], [place], operated by [facility] Course: patient was admitted with fever/leucocytosis, was found to have ESBL klebsiella. Was treated with IV meropenem/diflucan per ID recommendation. Also had urinary retention- washburn cath was placed, discussed with urology service- pt will be DC with washburn cath. Continue proscar/finesteride. Will follow with urology after DC for further management. After completing his acute stay/treatment will be DC back to SNF without atbs        Ramon Ribeiro is a 44 y.o. male on hospital day 1 with a history of MRDD, Lennox Gestaut syndrome, epilepsy, neurogenic bladder, hypothyroidism, hypertension, history of DVT, DM 2.   Past surgical history is significant for recent lithotripsy with double-J stent placement. Fm hx was rev'd with his mother Ignacio Jerome and is negative for GI malignancies. history Obtained From:  nurse, his mother Ignacio Jerome and the electronic medical record. GI was asked to evaluate abnormal CT imaging-patient resides in a nursing facility and was sent to the emergency department for fever of 103 and abnormal electrolytes, noted gram negative urine, has been receiving IV Zosyn. Has been afebrile since arrival.  CT of the abdomen pelvis noted scattered amount of stool throughout the large bowel to the level of the descending colon with the sigmoid colon dilated and markedly redundant measuring up to 6.8 cm. At the distal sigmoid and beginning within the region of the rectum there is diffuse mural thickening which extends from the distal sigmoid to the anus with surrounding perisigmoid and perirectal/perianal inflammatory stranding, the inflammation is suggestive of an underlying infectious etiology. His medical history was discussed with his mother , he has no previous history of colonoscopy, no family history of IBD or CRC.   She reports that he has suffered from chronic constipation/obstipation for greater than 10 years.        Past Medical History:   Diagnosis Date    Anemia     CKD (chronic kidney disease)     Diabetes mellitus ([facility] [address]     Has a tremor     Hyperlipidemia     Hypokalemia     Intellectual disability     Kidney stone     Lennox-Gastaut syndrome (HCC)     Paralytic ileus (HCC)     Thyroid disease     Venous thrombosis and embolism      Past Surgical History:   Procedure Laterality Date    LITHOTRIPSY  07/30/2020     Social History     Socioeconomic History    Marital status: Single     Spouse name: Not on file    Number of children: Not on file    Years of education: Not on file    Highest education level: Not on file   Occupational History    Not on file   Tobacco Use    Smoking status: Never Smoker    Smokeless tobacco: Never Used   Vaping Use    Vaping Use: Never used   Substance and Sexual Activity    Alcohol use: Never    Drug use: Never    Sexual activity: Not Currently   Other Topics Concern    Not on file   Social History Narrative    Not on file     Social Determinants of Health     Financial Resource Strain:     Difficulty of Paying Living Expenses:    Food Insecurity:     Worried About Running Out of Food in the Last Year:     [address] in the Last Year:    Transportation Needs:     Lack of Transportation (Medical):  Lack of Transportation (Non-Medical):    Physical Activity:     Days of Exercise per Week:     Minutes of Exercise per Session:    Stress:     Feeling of Stress :    Social Connections:     Frequency of Communication with Friends and Family:     Frequency of Social Gatherings with Friends and Family:     Attends Worship Services:     Active Member of Clubs or Organizations:     Attends Club or Organization Meetings:     Marital Status:    Intimate Partner Violence:     Fear of Current or Ex-Partner:     Emotionally Abused:     Physically Abused:     Sexually Abused:      No family history on file. Allergies   Allergen Reactions    Cefazolin     Celontin [Methsuximide]     Duricef [Cefadroxil]     Simvastatin     Tramadol Other (See Comments)     Other reaction(s): Other: See Comments  \"causes seizures\"  Causes seizures      Vicodin [Hydrocodone-Acetaminophen]          Review of Systems   Unable to perform ROS: Other       Objective:   /76 (Site: Right Upper Arm, Position: Sitting, Cuff Size: Small Adult)   Pulse 88   Resp 12   SpO2 98%     Physical Exam  Constitutional:       General: He is not in acute distress. Appearance: He is well-developed. HENT:      Head: Normocephalic and atraumatic. Eyes:      Conjunctiva/sclera: Conjunctivae normal.      Pupils: Pupils are equal, round, and reactive to light.    Cardiovascular:      Rate and Rhythm: Normal rate and regular rhythm. Heart sounds: Normal heart sounds. Pulmonary:      Effort: Pulmonary effort is normal. No respiratory distress. Breath sounds: Normal breath sounds. No wheezing or rales. Abdominal:      General: Bowel sounds are normal. There is no distension. Palpations: Abdomen is soft. Abdomen is not rigid. There is no hepatomegaly, splenomegaly or mass. Tenderness: There is no abdominal tenderness. There is no guarding or rebound. Musculoskeletal:         General: No tenderness or deformity. Normal range of motion. Cervical back: Neck supple. Skin:     Coloration: Skin is not pale. Findings: No erythema or rash. Neurological:      Mental Status: He is alert and oriented to person, place, and time. Laboratory, Pathology, Radiology reviewed in detail with relevantimportant investigations summarized below:  Lab Results   Component Value Date    WBC 5.5 02/08/2021    WBC 6.2 02/04/2021    WBC 5.8 02/01/2021    WBC 4.4 01/25/2021    WBC 5.4 01/23/2021    HGB 11.1 02/08/2021    HGB 11.0 02/04/2021    HGB 11.6 02/01/2021    HGB 11.4 01/25/2021    HGB 12.2 01/23/2021    HCT 34.2 02/08/2021    HCT 33.1 02/04/2021    HCT 34.8 02/01/2021    HCT 34.9 01/25/2021    HCT 36.8 01/23/2021    MCV 87.3 02/08/2021    MCV 86.6 02/04/2021    MCV 86.9 02/01/2021    MCV 88.2 01/25/2021    MCV 88.6 01/23/2021     02/08/2021     02/04/2021     02/01/2021     01/25/2021     01/23/2021    . Lab Results   Component Value Date    ALT 8 01/22/2021    ALT <5 11/26/2020    ALT <5 11/26/2020    AST 20 01/22/2021    AST 10 11/26/2020    AST 10 11/26/2020    ALKPHOS 61 01/22/2021    ALKPHOS 52 11/26/2020    ALKPHOS 54 11/26/2020    BILITOT 0.5 01/22/2021    BILITOT 0.4 11/26/2020    BILITOT 0.5 11/26/2020       No results found.   Lab Results   Component Value Date    IRON 25 08/02/2020    TIBC 117 08/02/2020    FERRITIN 541.0 08/02/2020     Lab Results   Component Value Date    INR 1.1 11/26/2020    INR 1.0 11/07/2020     No components found for: ACUTEHEPATITISSCREEN  No components found for: CELIACPANEL  No components found for: STOOLCULTURE, C.DIFF, STOOLOVAPARASITE, STOOLLEUCOCYTE        Assessment:   1-Colon cancer screening  No previous colonoscopy. Patient was seen by the gastroenterology team on 2 occasions. At the time of initial evaluation patient had CAT scan that shows bowel thickening in the context of significant stool burden. Patient was admitted at that time owing to septicemia. Patient's legal guardian declined colonoscopy at that time. Patient also noted GI consult sequently owing to stool impaction  Will proceed with colonoscopy pending legal guardian consent. Patient will need an extended colon prep given the significant stoolburden/obstipation/constipation. According to caregiver currently in exam room, patient has formed stool. No evidence of diarrhea. No evidence of diarrhea or blood per rectum. 2- Associated medical conditions include but not limited to MRDD, Lennox-Gastaut syndrome epilepsy, neurogenic bladder with Jameson catheter, hypothyroidism, hypertension, diabetes mellitus, multiple hospitalizations with ESBL bacteremia  Return in about 3 months (around 1/25/2022) for Post procedure results discussion, further management.       Gideon Olvera MD Nostril Rim Text: The closure involved the nostril rim.

## 2022-11-30 ENCOUNTER — HOSPITAL ENCOUNTER (OUTPATIENT)
Dept: WOMENS IMAGING | Age: 52
Discharge: HOME | End: 2022-12-02
Payer: MEDICARE

## 2022-11-30 DIAGNOSIS — M81.0 OSTEOPOROSIS, UNSPECIFIED OSTEOPOROSIS TYPE, UNSPECIFIED PATHOLOGICAL FRACTURE PRESENCE: ICD-10-CM

## 2022-11-30 PROCEDURE — 77080 DXA BONE DENSITY AXIAL: CPT

## 2023-02-18 NOTE — FLOWSHEET NOTE
Received report from Inessa Orellana at [facility]. Pt coming to specials for PICC insertion. 1415 report called to Sahara at [facility] ,post PICC insertion. Pt tolerated well, back to [facility] via [facility]. MD Bryant, Shannen ortez. Caroline Akbar notified. No new recommendations made. Patient and family refused Lantus 5 Units, despite of . Reorder Metoprolol 12.5 BID Will continue to monitor. notify provider. notify provider. notify provider. notify provider.

## 2023-09-29 ENCOUNTER — HOSPITAL ENCOUNTER (EMERGENCY)
Age: 53
Discharge: HOME | End: 2023-09-29
Attending: EMERGENCY MEDICINE
Payer: MEDICARE

## 2023-09-29 ENCOUNTER — APPOINTMENT (OUTPATIENT)
Dept: CT IMAGING | Age: 53
End: 2023-09-29
Payer: MEDICARE

## 2023-09-29 VITALS
DIASTOLIC BLOOD PRESSURE: 58 MMHG | HEART RATE: 84 BPM | OXYGEN SATURATION: 95 % | RESPIRATION RATE: 18 BRPM | BODY MASS INDEX: 32.1 KG/M2 | SYSTOLIC BLOOD PRESSURE: 121 MMHG | WEIGHT: 237 LBS | HEIGHT: 72 IN | TEMPERATURE: 98.4 F

## 2023-09-29 DIAGNOSIS — S09.90XA CLOSED HEAD INJURY, INITIAL ENCOUNTER: ICD-10-CM
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
## 2023-09-29 PROCEDURE — 70450 CT HEAD/BRAIN W/O DYE: CPT

## 2023-09-29 PROCEDURE — 99284 EMERGENCY DEPT VISIT MOD MDM: CPT

## 2023-09-29 RX ORDER — GRANULES FOR ORAL 3 G/1
3 POWDER ORAL ONCE
COMMUNITY

## 2023-09-29 ASSESSMENT — ENCOUNTER SYMPTOMS
TROUBLE SWALLOWING: 0
VOICE CHANGE: 0
SINUS PRESSURE: 0
EYE DISCHARGE: 0
VOMITING: 0
DIARRHEA: 0
WHEEZING: 0
FACIAL SWELLING: 0
COUGH: 0
BACK PAIN: 0
CHEST TIGHTNESS: 0
STRIDOR: 0
EYE PAIN: 0
ABDOMINAL PAIN: 0
SORE THROAT: 0
SHORTNESS OF BREATH: 0
CHOKING: 0
EYE REDNESS: 0
BLOOD IN STOOL: 0
CONSTIPATION: 0

## 2023-09-29 ASSESSMENT — PAIN - FUNCTIONAL ASSESSMENT: PAIN_FUNCTIONAL_ASSESSMENT: 0-10

## 2023-09-29 ASSESSMENT — PAIN SCALES - GENERAL: PAINLEVEL_OUTOF10: 3

## 2023-09-29 ASSESSMENT — PAIN DESCRIPTION - LOCATION: LOCATION: HEAD

## 2023-09-29 NOTE — ED PROVIDER NOTES
[facility] dEPARTMENT eNCOUnter      Pt Name: Gabriella Purdy  MRN: [id number]  [address] [facility] [place] 1970  Date of evaluation: 9/29/2023  Provider: Flavio Blank MD    CHIEF COMPLAINT       Chief Complaint   Patient presents with    Fall     Pt was in a wheelchair and fell backward. ORY OF PRESENT ILLNESS   (Location/Symptom, Timing/Onset,Context/Setting, Quality, Duration, Modifying Factors, Severity)  Note limiting factors. Gabriella Purdy is a 52 y.o. male who presents to the emergency department with disability mental retardation hypertension hypothyroidism seizure disorder GE reflux allergy syndrome chronic tremors renal disease while being transferred from [facility] to the wheelchair in a group home he fell and injured back of his head with swelling presented here for further evaluation as a protocol from the group home as per information paramedics brought him here patient moving all extremities but nonverbal    HPI    NursingNotes were reviewed. REVIEW OF SYSTEMS    (2-9 systems for level 4, 10 or more for level 5)     Review of Systems   Constitutional: Negative. Negative for activity change and fever. HENT:  Negative for congestion, drooling, facial swelling, mouth sores, nosebleeds, sinus pressure, sore throat, trouble swallowing and voice change. Eyes:  Negative for pain, discharge, redness and visual disturbance. Respiratory:  Negative for cough, choking, chest tightness, shortness of breath, wheezing and stridor. Cardiovascular:  Negative for chest pain, palpitations and leg swelling. Gastrointestinal:  Negative for abdominal pain, blood in stool, constipation, diarrhea and vomiting. Endocrine: Negative for cold intolerance, polyphagia and polyuria. Genitourinary:  Negative for dysuria, flank pain, frequency, genital sores and urgency. Musculoskeletal:  Negative for back pain, joint swelling, neck pain and neck stiffness. Skin:  Negative for pallor and rash.    Neurological: medications on file          (Please note that portions of this note were completed with a voice recognition program.  Efforts were made to edit the dictations but occasionally words are mis-transcribed.)    Paulo Batres MD (electronically signed)  Attending Emergency Physician        Paulo Batres MD  09/29/23 [id number]

## 2025-04-29 NOTE — FLOWSHEET NOTE
Patient refused lovenox. Patient's mother is aware. This RN educated both patient and mother. Add Progress Note...

## 2025-06-12 ENCOUNTER — APPOINTMENT (OUTPATIENT)
Dept: GASTROENTEROLOGY | Facility: CLINIC | Age: 55
End: 2025-06-12
Payer: MEDICARE

## 2025-09-04 ENCOUNTER — OFFICE VISIT (OUTPATIENT)
Dept: GASTROENTEROLOGY | Facility: CLINIC | Age: 55
End: 2025-09-04
Payer: MEDICARE

## 2025-09-04 VITALS
HEIGHT: 72 IN | TEMPERATURE: 98 F | DIASTOLIC BLOOD PRESSURE: 64 MMHG | HEART RATE: 86 BPM | SYSTOLIC BLOOD PRESSURE: 108 MMHG | BODY MASS INDEX: 29.8 KG/M2 | WEIGHT: 220 LBS

## 2025-09-04 DIAGNOSIS — Z93.59 SUPRAPUBIC CATHETER (MULTI): Primary | ICD-10-CM

## 2025-09-04 DIAGNOSIS — N18.31 STAGE 3A CHRONIC KIDNEY DISEASE (MULTI): ICD-10-CM

## 2025-09-04 DIAGNOSIS — R56.00 FEBRILE SEIZURE (MULTI): ICD-10-CM

## 2025-09-04 PROCEDURE — 1036F TOBACCO NON-USER: CPT | Performed by: NURSE PRACTITIONER

## 2025-09-04 PROCEDURE — 99203 OFFICE O/P NEW LOW 30 MIN: CPT | Performed by: NURSE PRACTITIONER

## 2025-09-04 PROCEDURE — 3008F BODY MASS INDEX DOCD: CPT | Performed by: NURSE PRACTITIONER

## 2025-09-04 PROCEDURE — 99202 OFFICE O/P NEW SF 15 MIN: CPT

## 2025-09-04 RX ORDER — SULFAMETHOXAZOLE AND TRIMETHOPRIM 400; 80 MG/1; MG/1
TABLET ORAL
COMMUNITY
Start: 2024-12-11

## 2025-09-04 RX ORDER — ONDANSETRON 4 MG/1
4 TABLET, FILM COATED ORAL EVERY 8 HOURS PRN
COMMUNITY

## 2025-09-04 RX ORDER — FINASTERIDE 5 MG/1
TABLET, FILM COATED ORAL
COMMUNITY
Start: 2025-04-23

## 2025-09-04 RX ORDER — BACLOFEN 10 MG/1
10 TABLET ORAL
COMMUNITY

## 2025-09-04 RX ORDER — ADHESIVE BANDAGE
30 BANDAGE TOPICAL
COMMUNITY

## 2025-09-04 RX ORDER — LORATADINE 10 MG/1
10 TABLET ORAL
COMMUNITY

## 2025-09-04 RX ORDER — POLYETHYLENE GLYCOL 3350 17 G/17G
17 POWDER, FOR SOLUTION ORAL 2 TIMES DAILY
COMMUNITY
Start: 2025-05-09

## 2025-09-04 RX ORDER — LEVOTHYROXINE SODIUM 50 UG/1
50 TABLET ORAL DAILY
COMMUNITY

## 2025-09-04 RX ORDER — DIVALPROEX SODIUM 500 MG/1
500 TABLET, DELAYED RELEASE ORAL
COMMUNITY

## 2025-09-04 RX ORDER — GABAPENTIN 100 MG/1
100 CAPSULE ORAL 3 TIMES DAILY
COMMUNITY

## 2025-09-04 RX ORDER — SERTRALINE HYDROCHLORIDE 50 MG/1
50 TABLET, FILM COATED ORAL
COMMUNITY

## 2025-09-04 RX ORDER — LAMOTRIGINE 200 MG/1
200 TABLET ORAL 2 TIMES DAILY
COMMUNITY

## 2025-09-04 RX ORDER — AMLODIPINE BESYLATE 2.5 MG/1
2.5 TABLET ORAL
COMMUNITY

## 2025-09-04 RX ORDER — PSYLLIUM SEED (WITH DEXTROSE)
POWDER (GRAM) ORAL
COMMUNITY
Start: 2025-08-04

## 2025-09-04 RX ORDER — BUSPIRONE HYDROCHLORIDE 7.5 MG/1
TABLET ORAL
COMMUNITY
Start: 2025-08-22

## 2025-09-04 RX ORDER — FERROUS SULFATE 325(65) MG
325 TABLET ORAL
COMMUNITY

## 2025-09-04 RX ORDER — TAMSULOSIN HYDROCHLORIDE 0.4 MG/1
CAPSULE ORAL
COMMUNITY
Start: 2025-04-18

## 2025-09-04 RX ORDER — CHOLECALCIFEROL (VITAMIN D3) 50 MCG
2000 TABLET ORAL
COMMUNITY

## 2025-09-04 RX ORDER — DIVALPROEX SODIUM 250 MG/1
750 TABLET, DELAYED RELEASE ORAL
COMMUNITY
Start: 2025-04-02

## 2025-09-04 RX ORDER — TROSPIUM CHLORIDE 20 MG/1
TABLET, FILM COATED ORAL
COMMUNITY
Start: 2025-04-18

## 2025-09-04 ASSESSMENT — ENCOUNTER SYMPTOMS
ALLERGIC/IMMUNOLOGIC NEGATIVE: 1
SHORTNESS OF BREATH: 0
PSYCHIATRIC NEGATIVE: 1
APNEA: 0
HEMATOLOGIC/LYMPHATIC NEGATIVE: 1
ENDOCRINE NEGATIVE: 1
MUSCULOSKELETAL NEGATIVE: 1
EYES NEGATIVE: 1
ROS GI COMMENTS: SEE HPI
CHEST TIGHTNESS: 0
COUGH: 0
RESPIRATORY NEGATIVE: 1
CHILLS: 0
NEUROLOGICAL NEGATIVE: 1
FATIGUE: 0
DIFFICULTY URINATING: 0
STRIDOR: 0
WHEEZING: 0
DIAPHORESIS: 0
CARDIOVASCULAR NEGATIVE: 1
FEVER: 0